# Patient Record
Sex: FEMALE | Race: WHITE | HISPANIC OR LATINO
[De-identification: names, ages, dates, MRNs, and addresses within clinical notes are randomized per-mention and may not be internally consistent; named-entity substitution may affect disease eponyms.]

---

## 2017-12-14 ENCOUNTER — APPOINTMENT (OUTPATIENT)
Dept: CARDIOLOGY | Facility: CLINIC | Age: 57
End: 2017-12-14

## 2019-07-31 ENCOUNTER — APPOINTMENT (OUTPATIENT)
Dept: CV DIAGNOSTICS | Facility: HOSPITAL | Age: 59
End: 2019-07-31

## 2019-08-30 ENCOUNTER — APPOINTMENT (OUTPATIENT)
Dept: CV DIAGNOSTICS | Facility: HOSPITAL | Age: 59
End: 2019-08-30

## 2023-01-04 ENCOUNTER — APPOINTMENT (OUTPATIENT)
Dept: HOME HEALTH SERVICES | Facility: HOME HEALTH | Age: 63
End: 2023-01-04
Payer: MEDICARE

## 2023-01-04 VITALS
BODY MASS INDEX: 37.56 KG/M2 | WEIGHT: 220 LBS | TEMPERATURE: 97.3 F | HEIGHT: 64 IN | DIASTOLIC BLOOD PRESSURE: 68 MMHG | OXYGEN SATURATION: 95 % | SYSTOLIC BLOOD PRESSURE: 102 MMHG | HEART RATE: 77 BPM | RESPIRATION RATE: 18 BRPM

## 2023-01-04 DIAGNOSIS — Z87.898 PERSONAL HISTORY OF OTHER SPECIFIED CONDITIONS: ICD-10-CM

## 2023-01-04 DIAGNOSIS — Z82.3 FAMILY HISTORY OF STROKE: ICD-10-CM

## 2023-01-04 DIAGNOSIS — Z83.438 FAMILY HISTORY OF OTHER DISORDER OF LIPOPROTEIN METABOLISM AND OTHER LIPIDEMIA: ICD-10-CM

## 2023-01-04 DIAGNOSIS — I25.2 OLD MYOCARDIAL INFARCTION: ICD-10-CM

## 2023-01-04 DIAGNOSIS — G47.33 OBSTRUCTIVE SLEEP APNEA (ADULT) (PEDIATRIC): ICD-10-CM

## 2023-01-04 DIAGNOSIS — I10 ESSENTIAL (PRIMARY) HYPERTENSION: ICD-10-CM

## 2023-01-04 DIAGNOSIS — K21.9 GASTRO-ESOPHAGEAL REFLUX DISEASE W/OUT ESOPHAGITIS: ICD-10-CM

## 2023-01-04 DIAGNOSIS — Z82.49 FAMILY HISTORY OF ISCHEMIC HEART DISEASE AND OTHER DISEASES OF THE CIRCULATORY SYSTEM: ICD-10-CM

## 2023-01-04 DIAGNOSIS — E55.9 VITAMIN D DEFICIENCY, UNSPECIFIED: ICD-10-CM

## 2023-01-04 DIAGNOSIS — E78.5 HYPERLIPIDEMIA, UNSPECIFIED: ICD-10-CM

## 2023-01-04 DIAGNOSIS — E27.8 OTHER SPECIFIED DISORDERS OF ADRENAL GLAND: ICD-10-CM

## 2023-01-04 DIAGNOSIS — Z81.8 FAMILY HISTORY OF OTHER MENTAL AND BEHAVIORAL DISORDERS: ICD-10-CM

## 2023-01-04 DIAGNOSIS — Z86.73 PERSONAL HISTORY OF TRANSIENT ISCHEMIC ATTACK (TIA), AND CEREBRAL INFARCTION W/OUT RESIDUAL DEFICITS: ICD-10-CM

## 2023-01-04 DIAGNOSIS — F41.1 GENERALIZED ANXIETY DISORDER: ICD-10-CM

## 2023-01-04 PROCEDURE — 99497 ADVNCD CARE PLAN 30 MIN: CPT | Mod: 95

## 2023-01-04 PROCEDURE — 99344 HOME/RES VST NEW MOD MDM 60: CPT | Mod: 25,95

## 2023-01-04 RX ORDER — ERGOCALCIFEROL 1.25 MG/1
1.25 MG CAPSULE ORAL
Qty: 12 | Refills: 1 | Status: ACTIVE | COMMUNITY
Start: 2023-01-04 | End: 1900-01-01

## 2023-01-04 RX ORDER — ATORVASTATIN CALCIUM 40 MG/1
40 TABLET, FILM COATED ORAL
Qty: 90 | Refills: 3 | Status: ACTIVE | COMMUNITY
Start: 2023-01-04

## 2023-01-04 RX ORDER — GABAPENTIN 300 MG/1
300 CAPSULE ORAL
Qty: 90 | Refills: 3 | Status: ACTIVE | COMMUNITY
Start: 2023-01-04

## 2023-01-04 RX ORDER — CLOPIDOGREL 75 MG/1
75 TABLET, FILM COATED ORAL DAILY
Qty: 90 | Refills: 3 | Status: ACTIVE | COMMUNITY
Start: 2023-01-04

## 2023-01-04 RX ORDER — HYDROCHLOROTHIAZIDE 25 MG/1
25 TABLET ORAL DAILY
Qty: 90 | Refills: 3 | Status: ACTIVE | COMMUNITY
Start: 2023-01-04

## 2023-01-04 RX ORDER — FAMOTIDINE 40 MG/1
40 TABLET, FILM COATED ORAL DAILY
Qty: 90 | Refills: 3 | Status: ACTIVE | COMMUNITY
Start: 2023-01-04 | End: 1900-01-01

## 2023-01-04 RX ORDER — SERTRALINE HYDROCHLORIDE 100 MG/1
100 TABLET, FILM COATED ORAL DAILY
Qty: 90 | Refills: 3 | Status: ACTIVE | COMMUNITY
Start: 2023-01-04

## 2023-01-04 RX ORDER — METOPROLOL TARTRATE 25 MG/1
25 TABLET, FILM COATED ORAL TWICE DAILY
Qty: 180 | Refills: 3 | Status: ACTIVE | COMMUNITY
Start: 2023-01-04

## 2023-01-04 RX ORDER — CLONAZEPAM 0.5 MG/1
0.5 TABLET ORAL
Qty: 30 | Refills: 0 | Status: ACTIVE | COMMUNITY
Start: 2023-01-04

## 2023-01-04 NOTE — CURRENT MEDS
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

## 2023-01-04 NOTE — CHRONIC CARE ASSESSMENT
[Patient Non-adherent to care plan] : patient non-adherent to care plan [de-identified] : does not exercise regularly due to caregiving [de-identified] : ADA, low fat, low sodium- but patient is non adherent

## 2023-01-04 NOTE — PHYSICAL EXAM
[No Acute Distress] : no acute distress [Well Nourished] : well nourished [Well Developed] : well developed [Normal Sclera/Conjunctiva] : normal sclera/conjunctiva [PERRL] : pupils equal, round and reactive to light [EOMI] : extra ocular movement intact [Normal Outer Ear/Nose] : the ears and nose were normal in appearance [Normal Oropharynx] : the oropharynx was normal [No Respiratory Distress] : no respiratory distress [Clear to Auscultation] : lungs were clear to auscultation bilaterally [No Accessory Muscle Use] : no accessory muscle use [Normal Rate] : heart rate was normal  [Regular Rhythm] : with a regular rhythm [Normal S1, S2] : normal S1 and S2 [No Murmurs] : no murmurs heard [No Edema] : there was no peripheral edema [Normal Bowel Sounds] : normal bowel sounds [Non Tender] : non-tender [Soft] : abdomen soft [Not Distended] : not distended [Normal Post Cervical Nodes] : no posterior cervical lymphadenopathy [Normal Anterior Cervical Nodes] : no anterior cervical lymphadenopathy [No CVA Tenderness] : no ~M costovertebral angle tenderness [No Spinal Tenderness] : no spinal tenderness [Normal Gait] : normal gait [Normal Strength/Tone] : muscle strength and tone were normal [No Rash] : no rash [Cranial Nerves Intact] : cranial nerves 2-12 were intact [No Gross Sensory Deficits] : no gross sensory deficits [Oriented x3] : oriented to person, place, and time [Normal Affect] : the affect was normal [Normal Mood] : the mood was normal [Foot Ulcers] : no foot ulcers [de-identified] : Obese female, sitting up comfortably on couch [de-identified] : foot ulcer has healed [de-identified] : no suicidal or homicidal ideation

## 2023-01-04 NOTE — HEALTH RISK ASSESSMENT
[HRA Reviewed] : Health risk assessment reviewed [Independent] : managing finances [No falls in past year] : Patient reported no falls in the past year [No] : The patient does not have visual impairment [TimeGetUpGo] : 0 [de-identified] : fully ambulatory

## 2023-01-04 NOTE — HISTORY OF PRESENT ILLNESS
[FreeTextEntry1] : T [FreeTextEntry2] : HAROON MONET is being seen for a visit provided via telehealth. Real-time audio visual technology was provided using [employer]. If Teledoc technology was not used it was due to inability to connect via Teledoc technology.\par \par  \par \par HAROON MONET (Aug  7 1960) or his/her representative was educated about potential risks associated with any technology used while obtaining care through telehealth during this public health emergency. HAROON MONET (Aug  7 1960) or his/her representative was educated that this Informed Consent for Telehealth Services During a Public Health Emergency will remain in effect solely during the term of the public health emergency. HAROON MONET (Aug  7 1960) or his/her representative has verbally consented to the use of telehealth on 01/04/2023  3:00PM.\par \par  \par \par HAROON MONET was located at their home, [address] [place] [address] [address] [place] [address], at the time of the visit.\par \par The [facility] clinician, MARILIN SHINE, was located remotely at their home in [place] at the time of the visit.\par \par The patient, HAROON MONET, and the [facility] clinician, MARILIN SHINE, participated in the telehealth encounter.\par \par Other participants included the RNTT, who was in the home with the patient, performed vitals monitoring and physical exam, and facilitated the video visit with MARILIN SHINE. The assessment and plan was made on the basis of the information provided by the RNTT.\par \par  \par \par Other participants included: Annmarie Summers RN\par \par  \par \par RNTT Findings: \par \par  \par \par      Vitals: T 97.3 HR 77 RR 18 SpO2 95% /68mmHg\par \par  \par \par      Exam:Obese female, no other pertinent positive findings\par \par  \par \par      Assessment: HAROON MONET is an 62 year with Type 2 Diabetes on insulin with complications, Hypertension, Hyperlipidemia, CAD s/p 2 stents, Mass on Right Kidney, 7 TIAs, 2 MIs, Major Depressive disorder, Smoker seen today for initial home visit.\par \par Patient's last hospitalization was 2 months ago, patient had chest pain found to have CAD and 2 stents were placed.\par \par Since hospitalization patient reports she feels her health is not optimally controlled as 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 No changes in dentition or swallowing reported, No changes in hearing or vision reported. No changes in Cognition reported. Patient  reports depression but denies any suicidal or homicidal ideation. Patient is ADL independent and IADL independent. No changes in gait or falls reported. \par \par Patient's home environment is safe.\par  \par Goals of care discussed. MOLST completed. Patient goal is limited, DNR, DNI.\par \par

## 2023-01-04 NOTE — COUNSELING
[Obese -  ( BMI  >29.9 )] : obese - ( BMI  >29.9 ) [DASH diet recommended] : DASH diet recommended [Sodium restriction 2gm recommended] : sodium restriction 2 gm recommended [Medical/Nutritional supplementation as prescribed] : medical/nutritional supplementation as prescribed [Smoker, not interested in quitting] : smoker, not interested in quitting [Smoke/CO Detectors] : smoke/CO detectors [] : foot exam [Patient not on disease-modifying anti-rheumatic drug due to overall prognosis] : Patient not on disease-modifying anti-rheumatic drug due to overall prognosis [Completed] : Aspirin use discussion completed [Improve weight] : improve weight [Decrease stress] : decrease stress [Discussed disease trajectory with patient/caregiver] : discussed disease trajectory with patient/caregiver [Advanced Directives discussed: ____] : Advanced directives discussed: [unfilled] [DNR] : Code Status: DNR [Limited] : Treatment Guidelines: Limited [DNI] : Intubation: DNI [Last Verification Date: _____] : [facility] Completion/last verification date: [unfilled] [de-identified] : Patient reports No Trach

## 2023-01-04 NOTE — REASON FOR VISIT
[Initial Evaluation] : an initial evaluation [Pre-Visit Preparation] : pre-visit preparation was done [Intercurrent Specialty/Sub-specialty Visits] : the patient has intercurrent specialty/sub-specialty visits [FreeTextEntry1] : Type 2 Diabetes on insulin with complications, Hypertension, Hyperlipidemia, CAD s/p 2 stents, Mass on Right Kidney, 7 TIAs, 2 MIs, Major Depressive disorder, Smoker [FreeTextEntry2] : Chart Review [FreeTextEntry3] : Cardiology, Pain Management, Psychiatry, Neurology

## 2023-01-06 ENCOUNTER — OFFICE (OUTPATIENT)
Dept: URBAN - METROPOLITAN AREA CLINIC 30 | Facility: CLINIC | Age: 63
Setting detail: OPHTHALMOLOGY
End: 2023-01-06
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
## 2023-01-06 PROCEDURE — NO SHOW FE NO SHOW FEE: Performed by: OPHTHALMOLOGY

## 2023-01-19 ENCOUNTER — TRANSCRIPTION ENCOUNTER (OUTPATIENT)
Age: 63
End: 2023-01-19

## 2023-01-19 ENCOUNTER — NON-APPOINTMENT (OUTPATIENT)
Age: 63
End: 2023-01-19

## 2023-01-20 ENCOUNTER — NON-APPOINTMENT (OUTPATIENT)
Age: 63
End: 2023-01-20

## 2023-01-20 ENCOUNTER — LABORATORY RESULT (OUTPATIENT)
Age: 63
End: 2023-01-20

## 2023-01-23 ENCOUNTER — LABORATORY RESULT (OUTPATIENT)
Age: 63
End: 2023-01-23

## 2023-01-24 ENCOUNTER — LABORATORY RESULT (OUTPATIENT)
Age: 63
End: 2023-01-24

## 2023-01-25 ENCOUNTER — LABORATORY RESULT (OUTPATIENT)
Age: 63
End: 2023-01-25

## 2023-01-31 ENCOUNTER — TRANSCRIPTION ENCOUNTER (OUTPATIENT)
Age: 63
End: 2023-01-31

## 2023-02-01 ENCOUNTER — TRANSCRIPTION ENCOUNTER (OUTPATIENT)
Age: 63
End: 2023-02-01

## 2023-02-06 ENCOUNTER — NON-APPOINTMENT (OUTPATIENT)
Age: 63
End: 2023-02-06

## 2023-02-07 ENCOUNTER — TRANSCRIPTION ENCOUNTER (OUTPATIENT)
Age: 63
End: 2023-02-07

## 2023-02-07 ENCOUNTER — NON-APPOINTMENT (OUTPATIENT)
Age: 63
End: 2023-02-07

## 2023-02-08 ENCOUNTER — TRANSCRIPTION ENCOUNTER (OUTPATIENT)
Age: 63
End: 2023-02-08

## 2023-02-09 ENCOUNTER — NON-APPOINTMENT (OUTPATIENT)
Age: 63
End: 2023-02-09

## 2023-02-20 ENCOUNTER — INPATIENT (INPATIENT)
Facility: HOSPITAL | Age: 63
LOS: 2 days | Discharge: HOME HEALTH | DRG: 617 | End: 2023-02-23
Attending: STUDENT IN AN ORGANIZED HEALTH CARE EDUCATION/TRAINING PROGRAM
Payer: MEDICARE

## 2023-02-20 ENCOUNTER — TRANSCRIPTION ENCOUNTER (OUTPATIENT)
Age: 63
End: 2023-02-20

## 2023-02-20 VITALS
SYSTOLIC BLOOD PRESSURE: 112 MMHG | OXYGEN SATURATION: 94 % | WEIGHT: 210.1 LBS | RESPIRATION RATE: 16 BRPM | HEART RATE: 87 BPM | HEIGHT: 64 IN | TEMPERATURE: 99 F | DIASTOLIC BLOOD PRESSURE: 76 MMHG

## 2023-02-20 DIAGNOSIS — F41.9 ANXIETY DISORDER, UNSPECIFIED: ICD-10-CM

## 2023-02-20 DIAGNOSIS — I10 ESSENTIAL (PRIMARY) HYPERTENSION: ICD-10-CM

## 2023-02-20 DIAGNOSIS — G20 PARKINSON'S DISEASE: ICD-10-CM

## 2023-02-20 DIAGNOSIS — I25.2 OLD MYOCARDIAL INFARCTION: ICD-10-CM

## 2023-02-20 DIAGNOSIS — M86.9 OSTEOMYELITIS, UNSPECIFIED: ICD-10-CM

## 2023-02-20 DIAGNOSIS — Z29.9 ENCOUNTER FOR PROPHYLACTIC MEASURES, UNSPECIFIED: ICD-10-CM

## 2023-02-20 DIAGNOSIS — K21.9 GASTRO-ESOPHAGEAL REFLUX DISEASE WITHOUT ESOPHAGITIS: ICD-10-CM

## 2023-02-20 DIAGNOSIS — E11.9 TYPE 2 DIABETES MELLITUS WITHOUT COMPLICATIONS: ICD-10-CM

## 2023-02-20 LAB
ALBUMIN SERPL ELPH-MCNC: 3.2 G/DL — LOW (ref 3.3–5)
ALP SERPL-CCNC: 139 U/L — HIGH (ref 40–120)
ALT FLD-CCNC: 23 U/L — SIGNIFICANT CHANGE UP (ref 10–45)
ANION GAP SERPL CALC-SCNC: 10 MMOL/L — SIGNIFICANT CHANGE UP (ref 5–17)
APTT BLD: 29 SEC — SIGNIFICANT CHANGE UP (ref 27.5–35.5)
AST SERPL-CCNC: 19 U/L — SIGNIFICANT CHANGE UP (ref 10–40)
B-OH-BUTYR SERPL-SCNC: 0.1 MMOL/L — SIGNIFICANT CHANGE UP
BASE EXCESS BLDV CALC-SCNC: 0.3 MMOL/L — SIGNIFICANT CHANGE UP (ref -2–3)
BASOPHILS # BLD AUTO: 0.09 K/UL — SIGNIFICANT CHANGE UP (ref 0–0.2)
BASOPHILS NFR BLD AUTO: 0.7 % — SIGNIFICANT CHANGE UP (ref 0–2)
BILIRUB SERPL-MCNC: 0.2 MG/DL — SIGNIFICANT CHANGE UP (ref 0.2–1.2)
BLD GP AB SCN SERPL QL: NEGATIVE — SIGNIFICANT CHANGE UP
BUN SERPL-MCNC: 23 MG/DL — SIGNIFICANT CHANGE UP (ref 7–23)
CA-I SERPL-SCNC: 1.22 MMOL/L — SIGNIFICANT CHANGE UP (ref 1.15–1.33)
CALCIUM SERPL-MCNC: 9.4 MG/DL — SIGNIFICANT CHANGE UP (ref 8.4–10.5)
CHLORIDE SERPL-SCNC: 95 MMOL/L — LOW (ref 96–108)
CK MB CFR SERPL CALC: 1.6 NG/ML — SIGNIFICANT CHANGE UP (ref 0–6.7)
CK SERPL-CCNC: 47 U/L — SIGNIFICANT CHANGE UP (ref 25–170)
CO2 BLDV-SCNC: 28 MMOL/L — HIGH (ref 22–26)
CO2 SERPL-SCNC: 27 MMOL/L — SIGNIFICANT CHANGE UP (ref 22–31)
CREAT SERPL-MCNC: 1.18 MG/DL — SIGNIFICANT CHANGE UP (ref 0.5–1.3)
CRP SERPL-MCNC: 28.2 MG/L — HIGH (ref 0–4)
EGFR: 53 ML/MIN/1.73M2 — LOW
EOSINOPHIL # BLD AUTO: 0.41 K/UL — SIGNIFICANT CHANGE UP (ref 0–0.5)
EOSINOPHIL NFR BLD AUTO: 3 % — SIGNIFICANT CHANGE UP (ref 0–6)
ERYTHROCYTE [SEDIMENTATION RATE] IN BLOOD: 101 MM/HR — HIGH
GAS PNL BLDV: 128 MMOL/L — LOW (ref 136–145)
GAS PNL BLDV: SIGNIFICANT CHANGE UP
GLUCOSE SERPL-MCNC: 351 MG/DL — HIGH (ref 70–99)
GRAM STN FLD: SIGNIFICANT CHANGE UP
HCO3 BLDV-SCNC: 26 MMOL/L — SIGNIFICANT CHANGE UP (ref 22–29)
HCT VFR BLD CALC: 38.8 % — SIGNIFICANT CHANGE UP (ref 34.5–45)
HGB BLD-MCNC: 12.6 G/DL — SIGNIFICANT CHANGE UP (ref 11.5–15.5)
IMM GRANULOCYTES NFR BLD AUTO: 0.9 % — SIGNIFICANT CHANGE UP (ref 0–0.9)
INR BLD: 1.05 — SIGNIFICANT CHANGE UP (ref 0.88–1.16)
LACTATE SERPL-SCNC: 1.8 MMOL/L — SIGNIFICANT CHANGE UP (ref 0.5–2)
LYMPHOCYTES # BLD AUTO: 19.1 % — SIGNIFICANT CHANGE UP (ref 13–44)
LYMPHOCYTES # BLD AUTO: 2.63 K/UL — SIGNIFICANT CHANGE UP (ref 1–3.3)
MCHC RBC-ENTMCNC: 27 PG — SIGNIFICANT CHANGE UP (ref 27–34)
MCHC RBC-ENTMCNC: 32.5 GM/DL — SIGNIFICANT CHANGE UP (ref 32–36)
MCV RBC AUTO: 83.3 FL — SIGNIFICANT CHANGE UP (ref 80–100)
MONOCYTES # BLD AUTO: 0.68 K/UL — SIGNIFICANT CHANGE UP (ref 0–0.9)
MONOCYTES NFR BLD AUTO: 4.9 % — SIGNIFICANT CHANGE UP (ref 2–14)
NEUTROPHILS # BLD AUTO: 9.82 K/UL — HIGH (ref 1.8–7.4)
NEUTROPHILS NFR BLD AUTO: 71.4 % — SIGNIFICANT CHANGE UP (ref 43–77)
NRBC # BLD: 0 /100 WBCS — SIGNIFICANT CHANGE UP (ref 0–0)
PCO2 BLDV: 48 MMHG — HIGH (ref 39–42)
PH BLDV: 7.35 — SIGNIFICANT CHANGE UP (ref 7.32–7.43)
PLATELET # BLD AUTO: 471 K/UL — HIGH (ref 150–400)
PO2 BLDV: 36 MMHG — SIGNIFICANT CHANGE UP (ref 25–45)
POTASSIUM BLDV-SCNC: 5 MMOL/L — SIGNIFICANT CHANGE UP (ref 3.5–5.1)
POTASSIUM SERPL-MCNC: 5.1 MMOL/L — SIGNIFICANT CHANGE UP (ref 3.5–5.3)
POTASSIUM SERPL-SCNC: 5.1 MMOL/L — SIGNIFICANT CHANGE UP (ref 3.5–5.3)
PROT SERPL-MCNC: 7.8 G/DL — SIGNIFICANT CHANGE UP (ref 6–8.3)
PROTHROM AB SERPL-ACNC: 12.5 SEC — SIGNIFICANT CHANGE UP (ref 10.5–13.4)
RBC # BLD: 4.66 M/UL — SIGNIFICANT CHANGE UP (ref 3.8–5.2)
RBC # FLD: 15.9 % — HIGH (ref 10.3–14.5)
RH IG SCN BLD-IMP: POSITIVE — SIGNIFICANT CHANGE UP
SAO2 % BLDV: 63.8 % — LOW (ref 67–88)
SARS-COV-2 RNA SPEC QL NAA+PROBE: NEGATIVE — SIGNIFICANT CHANGE UP
SODIUM SERPL-SCNC: 132 MMOL/L — LOW (ref 135–145)
SPECIMEN SOURCE: SIGNIFICANT CHANGE UP
WBC # BLD: 13.75 K/UL — HIGH (ref 3.8–10.5)
WBC # FLD AUTO: 13.75 K/UL — HIGH (ref 3.8–10.5)

## 2023-02-20 PROCEDURE — 99223 1ST HOSP IP/OBS HIGH 75: CPT | Mod: GC

## 2023-02-20 PROCEDURE — 71045 X-RAY EXAM CHEST 1 VIEW: CPT | Mod: 26

## 2023-02-20 PROCEDURE — 73660 X-RAY EXAM OF TOE(S): CPT | Mod: 26,RT

## 2023-02-20 PROCEDURE — 99285 EMERGENCY DEPT VISIT HI MDM: CPT

## 2023-02-20 RX ORDER — LIPASE/PROTEASE/AMYLASE 16-48-48K
2 CAPSULE,DELAYED RELEASE (ENTERIC COATED) ORAL
Refills: 0 | Status: DISCONTINUED | OUTPATIENT
Start: 2023-02-20 | End: 2023-02-23

## 2023-02-20 RX ORDER — ACETAMINOPHEN 500 MG
650 TABLET ORAL ONCE
Refills: 0 | Status: COMPLETED | OUTPATIENT
Start: 2023-02-20 | End: 2023-02-20

## 2023-02-20 RX ORDER — SODIUM CHLORIDE 9 MG/ML
1000 INJECTION, SOLUTION INTRAVENOUS
Refills: 0 | Status: DISCONTINUED | OUTPATIENT
Start: 2023-02-20 | End: 2023-02-23

## 2023-02-20 RX ORDER — SERTRALINE 25 MG/1
150 TABLET, FILM COATED ORAL EVERY 24 HOURS
Refills: 0 | Status: DISCONTINUED | OUTPATIENT
Start: 2023-02-21 | End: 2023-02-23

## 2023-02-20 RX ORDER — PIPERACILLIN AND TAZOBACTAM 4; .5 G/20ML; G/20ML
4.5 INJECTION, POWDER, LYOPHILIZED, FOR SOLUTION INTRAVENOUS EVERY 8 HOURS
Refills: 0 | Status: DISCONTINUED | OUTPATIENT
Start: 2023-02-21 | End: 2023-02-23

## 2023-02-20 RX ORDER — DEXTROSE 50 % IN WATER 50 %
25 SYRINGE (ML) INTRAVENOUS ONCE
Refills: 0 | Status: DISCONTINUED | OUTPATIENT
Start: 2023-02-20 | End: 2023-02-23

## 2023-02-20 RX ORDER — INSULIN GLARGINE 100 [IU]/ML
15 INJECTION, SOLUTION SUBCUTANEOUS AT BEDTIME
Refills: 0 | Status: DISCONTINUED | OUTPATIENT
Start: 2023-02-20 | End: 2023-02-23

## 2023-02-20 RX ORDER — GLUCAGON INJECTION, SOLUTION 0.5 MG/.1ML
1 INJECTION, SOLUTION SUBCUTANEOUS ONCE
Refills: 0 | Status: DISCONTINUED | OUTPATIENT
Start: 2023-02-20 | End: 2023-02-23

## 2023-02-20 RX ORDER — DEXTROSE 50 % IN WATER 50 %
12.5 SYRINGE (ML) INTRAVENOUS ONCE
Refills: 0 | Status: DISCONTINUED | OUTPATIENT
Start: 2023-02-20 | End: 2023-02-23

## 2023-02-20 RX ORDER — DEXTROSE 50 % IN WATER 50 %
15 SYRINGE (ML) INTRAVENOUS ONCE
Refills: 0 | Status: DISCONTINUED | OUTPATIENT
Start: 2023-02-20 | End: 2023-02-23

## 2023-02-20 RX ORDER — INFLUENZA VIRUS VACCINE 15; 15; 15; 15 UG/.5ML; UG/.5ML; UG/.5ML; UG/.5ML
0.5 SUSPENSION INTRAMUSCULAR ONCE
Refills: 0 | Status: DISCONTINUED | OUTPATIENT
Start: 2023-02-20 | End: 2023-02-23

## 2023-02-20 RX ORDER — CLOPIDOGREL BISULFATE 75 MG/1
75 TABLET, FILM COATED ORAL EVERY 24 HOURS
Refills: 0 | Status: DISCONTINUED | OUTPATIENT
Start: 2023-02-21 | End: 2023-02-23

## 2023-02-20 RX ORDER — VANCOMYCIN HCL 1 G
1000 VIAL (EA) INTRAVENOUS ONCE
Refills: 0 | Status: COMPLETED | OUTPATIENT
Start: 2023-02-20 | End: 2023-02-20

## 2023-02-20 RX ORDER — PANTOPRAZOLE SODIUM 20 MG/1
40 TABLET, DELAYED RELEASE ORAL
Refills: 0 | Status: DISCONTINUED | OUTPATIENT
Start: 2023-02-20 | End: 2023-02-23

## 2023-02-20 RX ORDER — NICOTINE POLACRILEX 2 MG
1 GUM BUCCAL DAILY
Refills: 0 | Status: DISCONTINUED | OUTPATIENT
Start: 2023-02-20 | End: 2023-02-23

## 2023-02-20 RX ORDER — ASPIRIN/CALCIUM CARB/MAGNESIUM 324 MG
81 TABLET ORAL EVERY 24 HOURS
Refills: 0 | Status: DISCONTINUED | OUTPATIENT
Start: 2023-02-21 | End: 2023-02-23

## 2023-02-20 RX ORDER — METOPROLOL TARTRATE 50 MG
25 TABLET ORAL EVERY 12 HOURS
Refills: 0 | Status: DISCONTINUED | OUTPATIENT
Start: 2023-02-21 | End: 2023-02-23

## 2023-02-20 RX ORDER — PIPERACILLIN AND TAZOBACTAM 4; .5 G/20ML; G/20ML
3.38 INJECTION, POWDER, LYOPHILIZED, FOR SOLUTION INTRAVENOUS ONCE
Refills: 0 | Status: COMPLETED | OUTPATIENT
Start: 2023-02-20 | End: 2023-02-20

## 2023-02-20 RX ORDER — SODIUM CHLORIDE 9 MG/ML
1000 INJECTION INTRAMUSCULAR; INTRAVENOUS; SUBCUTANEOUS ONCE
Refills: 0 | Status: COMPLETED | OUTPATIENT
Start: 2023-02-20 | End: 2023-02-20

## 2023-02-20 RX ORDER — INSULIN LISPRO 100/ML
VIAL (ML) SUBCUTANEOUS
Refills: 0 | Status: DISCONTINUED | OUTPATIENT
Start: 2023-02-20 | End: 2023-02-23

## 2023-02-20 RX ORDER — GABAPENTIN 400 MG/1
300 CAPSULE ORAL EVERY 24 HOURS
Refills: 0 | Status: DISCONTINUED | OUTPATIENT
Start: 2023-02-21 | End: 2023-02-23

## 2023-02-20 RX ORDER — ENOXAPARIN SODIUM 100 MG/ML
40 INJECTION SUBCUTANEOUS EVERY 24 HOURS
Refills: 0 | Status: DISCONTINUED | OUTPATIENT
Start: 2023-02-21 | End: 2023-02-21

## 2023-02-20 RX ORDER — ATORVASTATIN CALCIUM 80 MG/1
40 TABLET, FILM COATED ORAL AT BEDTIME
Refills: 0 | Status: DISCONTINUED | OUTPATIENT
Start: 2023-02-20 | End: 2023-02-23

## 2023-02-20 RX ORDER — VANCOMYCIN HCL 1 G
1500 VIAL (EA) INTRAVENOUS EVERY 12 HOURS
Refills: 0 | Status: COMPLETED | OUTPATIENT
Start: 2023-02-21 | End: 2023-02-21

## 2023-02-20 RX ORDER — CLONAZEPAM 1 MG
0.5 TABLET ORAL EVERY 24 HOURS
Refills: 0 | Status: DISCONTINUED | OUTPATIENT
Start: 2023-02-20 | End: 2023-02-23

## 2023-02-20 RX ORDER — INSULIN LISPRO 100/ML
5 VIAL (ML) SUBCUTANEOUS ONCE
Refills: 0 | Status: COMPLETED | OUTPATIENT
Start: 2023-02-20 | End: 2023-02-20

## 2023-02-20 RX ADMIN — Medication 650 MILLIGRAM(S): at 18:33

## 2023-02-20 RX ADMIN — Medication 10: at 21:55

## 2023-02-20 RX ADMIN — PIPERACILLIN AND TAZOBACTAM 200 GRAM(S): 4; .5 INJECTION, POWDER, LYOPHILIZED, FOR SOLUTION INTRAVENOUS at 18:04

## 2023-02-20 RX ADMIN — Medication 5 UNIT(S): at 19:01

## 2023-02-20 RX ADMIN — PIPERACILLIN AND TAZOBACTAM 3.38 GRAM(S): 4; .5 INJECTION, POWDER, LYOPHILIZED, FOR SOLUTION INTRAVENOUS at 18:33

## 2023-02-20 RX ADMIN — INSULIN GLARGINE 15 UNIT(S): 100 INJECTION, SOLUTION SUBCUTANEOUS at 23:19

## 2023-02-20 RX ADMIN — Medication 250 MILLIGRAM(S): at 18:16

## 2023-02-20 RX ADMIN — ATORVASTATIN CALCIUM 40 MILLIGRAM(S): 80 TABLET, FILM COATED ORAL at 21:55

## 2023-02-20 RX ADMIN — SODIUM CHLORIDE 1000 MILLILITER(S): 9 INJECTION INTRAMUSCULAR; INTRAVENOUS; SUBCUTANEOUS at 19:28

## 2023-02-20 RX ADMIN — Medication 650 MILLIGRAM(S): at 17:59

## 2023-02-20 RX ADMIN — Medication 1000 MILLIGRAM(S): at 19:28

## 2023-02-20 RX ADMIN — SODIUM CHLORIDE 1000 MILLILITER(S): 9 INJECTION INTRAMUSCULAR; INTRAVENOUS; SUBCUTANEOUS at 17:59

## 2023-02-20 NOTE — H&P ADULT - NSHPPHYSICALEXAM_GEN_ALL_CORE
T(C): 37.1 (02-20-23 @ 17:28), Max: 37.1 (02-20-23 @ 17:28)  HR: 87 (02-20-23 @ 17:28) (87 - 87)  BP: 112/76 (02-20-23 @ 17:28) (112/76 - 112/76)  RR: 16 (02-20-23 @ 17:28) (16 - 16)  SpO2: 94% (02-20-23 @ 17:28) (94% - 94%)    General: adult female lying in bed in NAD  HEENT: head NC/AT, no conjunctival injection, EOMI, MMM  Neck: supple  Cardiac: RRR, +S1/S2, no murmurs  Respiratory: lungs CTAB in upper fields, diminished breath sounds in b/l bases suspect 2/2 body habitus  Abdomen: normal bowel sounds, soft, NT, ND  Extremities: WWP, R foot wrapped in gauze, nontender to palpation, no LE edema  Vascular: 1+ radial pulses b/l, 2+ L DP, 1+ R PT pulse  Neuro: A&Ox3, no focal deficits  Psych: speech non-pressured, thoughts goal-oriented

## 2023-02-20 NOTE — H&P ADULT - PROBLEM SELECTOR PLAN 1
Presented w/ pus/foul-smelling drainage from the R great toenail. Not currently in pain. XR showing bone erosion and possible subcutaneous emphysema per radiology wet read. ESR and CRP elevated w/ leukocytosis. VSS. S/p Vancomycin 1g and Zosyn 3.375g in the ED. Seen by podiatry in the ED, planning for likely amputation after MRI, which will be used to determine level of amputation.  - follow up XR foot official read  - follow up MRI w/ con  - appreciate podiatry recs re wound care  - follow up deep wound Cx  - follow up BCx  - continue broad spectrum abx for now until cultures result

## 2023-02-20 NOTE — H&P ADULT - PROBLEM SELECTOR PLAN 8
F: none  E: replenish PRN  N: dash/tlc + cc  GI ppx: pantoprazole 40mg qAM  DVT ppx: lovenox 40mg q24h  Dispo: [facility]

## 2023-02-20 NOTE — ED ADULT TRIAGE NOTE - CHIEF COMPLAINT QUOTE
Pt presents to ED by EMS C/O infection to R first toe with swelling, drainage and pain radiating up R ankle. Pt sent by MD Sweet. Denies fevers. Also C/O tenderness and drainage from breast. Reports recent hx of negative breast biopsy. Hx of CAD with stents, stents to lower legs, COPD, DM, EMS reports  in field. Pt states, " My BG has been uncontrolled I need a new Dr.".

## 2023-02-20 NOTE — ED PROVIDER NOTE - OBJECTIVE STATEMENT
62F PMH CAD w/ stents, DM, HTN, PAD w/ b/l arterial stents (cannot recall name of vascular surgeon), CVA (no residual), peripheral neuropathy, mild early dementia, PSHx cholecystectomy p/w several complaints. Main concern is RLE pain. Pt states she has had to have her 1st toenail removed several times. Now since ~4w ago she noticed increased swelling/discoloration to her 1st toe. Has no sensation there at baseline and has no pain to toe- however now has pain travelling from ankle upwards over last few days. Had visiting doctor evaluate and was referred to ED. Pt also notes ~1mo of intermittent pain/drainage from L nipple. States she had a mammogram a few weeks prior to that, unknown results. Also c/o elevated glucose levels. States she is adherent to her meds. Occasional nausea. No other systemic symptoms.   Denies HA, f/c, SOB/CP, VD, focal weakness, new numbness, abd pain, urinary complaints, rashes, other joint pain. Cannot recall specific precipitating trauma.

## 2023-02-20 NOTE — H&P ADULT - PROBLEM SELECTOR PLAN 4
Known HTN, takes HCTZ 25mg qAM and Metoprolol tartrate 25mg BID at home.  - continue HCTZ and metoprolol w/ hold parameters

## 2023-02-20 NOTE — ED ADULT NURSE NOTE - OBJECTIVE STATEMENT
Pt is 62 y.o female client [facility] from home complaining of first big toe pain and swelling drainage and pain radiating up R ankle. Pt sent by MD Sweet. Denies fevers. Also C/O tenderness and drainage from breast. Reports recent hx of negative breast biopsy. Hx of CAD with stents, stents to lower legs, COPD, DM, EMS reports  in field. EKG done. IV inserted and labs drawn. Pt denies cp, sob, fever, chills, dizziness, headache, tingling, numbness, n/v/d, abd pain and urinary sx. Assessment ongoing. Will cont to monitor. Denies numbness or tingling.

## 2023-02-20 NOTE — CONSULT NOTE ADULT - SUBJECTIVE AND OBJECTIVE BOX
Attending: Dr. Robin     Patient is a 62y old  Female who presents with a chief complaint of right great toe infection     HPI: 62F PMH CAD w/ stents, DM, HTN, PAD w/ left LE arterial stent 2 weeks ago does not recall surgeon name, CVA (no residual), MI x 2 (most recent 2013), peripheral neuropathy, mild early dementia, PSHx cholecystectomy p/w right great toe wound that has been present for 1 month. Pt states she has had to have her 1st toenail removed several times in the past (does not recall name of podiatrist but states she only saw him one time one month ago for fungal nails). She noticed her right big toe had a wound on it roughly 1 month ago she has been cleaning the wound at home with hydrogen peroxide and water and dressing it with antibiotic cream. She notes malodor and drainage from the wound as well as color changes to her right big toe.  Has no sensation there at baseline and has no pain to toe. She admits to pain on the medial lower leg but denies calf pain.  She reports she had a stent placed in her left leg 2 weeks ago and is scheduled for a stent to be placed in her right leg at [facility] on 3/2. Had visiting doctor evaluate and was referred to ED. She denies HA, f/c, SOB/CP, N/V/D.       Review of systems negative except per HPI and as stated below  General:	 no weakness; no fevers, no chills  Skin/Breast: no rash  Respiratory and Thorax: no SOB, no cough  Cardiovascular:	No chest pain  Gastrointestinal:	 no nausea, vomiting , diarrhea  Genitourinary:	no dysuria, no difficulty urinating, no hematuria  Musculoskeletal:	no weakness, no joint swelling/pain  Neurological:	no focal weakness/numbness  Endocrine:	no polyuria, no polydipsia    PAST MEDICAL & SURGICAL HISTORY:    Home Medications:    Allergies    No Known Allergies    Intolerances      FAMILY HISTORY: DM-father     Social History: unknown       LABS                        12.6   13.75 )-----------( 471      ( 20 Feb 2023 18:01 )             38.8     02-20    132<L>  |  95<L>  |  23  ----------------------------<  351<H>  5.1   |  27  |  1.18    Ca    9.4      20 Feb 2023 18:01    TPro  7.8  /  Alb  3.2<L>  /  TBili  0.2  /  DBili  x   /  AST  19  /  ALT  23  /  AlkPhos  139<H>  02-20    PT/INR - ( 20 Feb 2023 18:01 )   PT: 12.5 sec;   INR: 1.05          PTT - ( 20 Feb 2023 18:01 )  PTT:29.0 sec    Vital Signs Last 24 Hrs  T(C): 37.1 (20 Feb 2023 17:28), Max: 37.1 (20 Feb 2023 17:28)  T(F): 98.8 (20 Feb 2023 17:28), Max: 98.8 (20 Feb 2023 17:28)  HR: 87 (20 Feb 2023 17:28) (87 - 87)  BP: 112/76 (20 Feb 2023 17:28) (112/76 - 112/76)  BP(mean): --  RR: 16 (20 Feb 2023 17:28) (16 - 16)  SpO2: 94% (20 Feb 2023 17:28) (94% - 94%)    Parameters below as of 20 Feb 2023 17:28  Patient On (Oxygen Delivery Method): room air      PHYSICAL EXAM  General: NAD, AA0x3  Lower Extremity Focused:  Vasc: Right DP 1/4, PT biphasic on doppler, left DP/PT 2/4, right dorsal foot with mild non pitting edema, Right great toe with dusky changes to level of MPJ  Derm: right great toe: [id number] wound to distal aspect, predebridement: hyperkeratotic cap with underlying fibrotic plug: post debridement: fibrotic base, probes to bone, +malodor, + seropurulent drainage, macerated border, periwound erythema   Neuro: Protective sensation absent   MSK: 5/5 muscle strength in all compartments no pain with ROM of R ankle or 1st MPJ, right medial lower leg tender to palpation       RADIOLOGY: X-ray right toes: WET READ: cortical erosion of distal phalanx great toe with pathologic fracture and surrounding soft tissue hypordensites (most likely due to soft tissue defect), chronic fracture base of 3rd proximal phalanx                        Attending: Dr. Robin     Patient is a 62y old  Female who presents with a chief complaint of right great toe infection     HPI: 62F PMH CAD w/ stents, DM, HTN, PAD w/ left LE arterial stent 2 weeks ago does not recall surgeon name, CVA (no residual), MI x 2 (most recent 2013), peripheral neuropathy, mild early dementia, PSHx cholecystectomy p/w right great toe wound that has been present for 1 month. Pt states she has had to have her 1st toenail removed several times in the past (does not recall name of podiatrist but states she only saw him one time one month ago for fungal nails). She noticed her right big toe had a wound on it roughly 1 month ago she has been cleaning the wound at home with hydrogen peroxide and water and dressing it with antibiotic cream. She notes malodor and drainage from the wound as well as color changes to her right big toe.  Has no sensation there at baseline and has no pain to toe. She admits to pain on the medial lower leg but denies calf pain.  She reports she had a stent placed in her left leg 2 weeks ago and is scheduled for a stent to be placed in her right leg at [facility] on 3/2. Had visiting doctor evaluate and was referred to ED. She denies HA, f/c, SOB/CP, N/V/D.       Review of systems negative except per HPI and as stated below  General:	 no weakness; no fevers, no chills  Skin/Breast: no rash  Respiratory and Thorax: no SOB, no cough  Cardiovascular:	No chest pain  Gastrointestinal:	 no nausea, vomiting , diarrhea  Genitourinary:	no dysuria, no difficulty urinating, no hematuria  Musculoskeletal:	no weakness, no joint swelling/pain  Neurological:	no focal weakness/numbness  Endocrine:	no polyuria, no polydipsia    PAST MEDICAL & SURGICAL HISTORY:    Home Medications:    Allergies    No Known Allergies    Intolerances      FAMILY HISTORY: DM-father     Social History: unknown       LABS                        12.6   13.75 )-----------( 471      ( 20 Feb 2023 18:01 )             38.8     02-20    132<L>  |  95<L>  |  23  ----------------------------<  351<H>  5.1   |  27  |  1.18    Ca    9.4      20 Feb 2023 18:01    TPro  7.8  /  Alb  3.2<L>  /  TBili  0.2  /  DBili  x   /  AST  19  /  ALT  23  /  AlkPhos  139<H>  02-20    PT/INR - ( 20 Feb 2023 18:01 )   PT: 12.5 sec;   INR: 1.05          PTT - ( 20 Feb 2023 18:01 )  PTT:29.0 sec    Vital Signs Last 24 Hrs  T(C): 37.1 (20 Feb 2023 17:28), Max: 37.1 (20 Feb 2023 17:28)  T(F): 98.8 (20 Feb 2023 17:28), Max: 98.8 (20 Feb 2023 17:28)  HR: 87 (20 Feb 2023 17:28) (87 - 87)  BP: 112/76 (20 Feb 2023 17:28) (112/76 - 112/76)  BP(mean): --  RR: 16 (20 Feb 2023 17:28) (16 - 16)  SpO2: 94% (20 Feb 2023 17:28) (94% - 94%)    Parameters below as of 20 Feb 2023 17:28  Patient On (Oxygen Delivery Method): room air      PHYSICAL EXAM  General: NAD, AA0x3  Lower Extremity Focused:  Vasc: Right DP 1/4, PT biphasic on doppler, left DP/PT 2/4, right dorsal foot with mild non pitting edema, Right great toe with dusky changes to level of MPJ  Derm: right great toe: [id number] wound to distal aspect, predebridement: hyperkeratotic cap with underlying fibrotic plug: post debridement: fibrotic base, probes to bone, +malodor, + seropurulent drainage, macerated border, periwound erythema, no crepitus or fluctuance   Neuro: Protective sensation absent   MSK: 5/5 muscle strength in all compartments no pain with ROM of R ankle or 1st MPJ, right medial lower leg tender to palpation       RADIOLOGY: X-ray right toes: WET READ: cortical erosion of distal phalanx great toe with pathologic fracture and surrounding soft tissue hypordensites (most likely due to soft tissue defect), chronic fracture base of 3rd proximal phalanx                        Attending: Dr. Robin     Patient is a 62y old  Female who presents with a chief complaint of right great toe infection     HPI: 62F PMH CAD w/ stents, DM, HTN, PAD w/ left LE arterial stent 2 weeks ago does not recall surgeon name, CVA (no residual), MI x 2 (most recent 2013), peripheral neuropathy, mild early dementia, PSHx cholecystectomy p/w right great toe wound that has been present for 1 month. Pt states she has had to have her 1st toenail removed several times in the past (does not recall name of podiatrist but states she only saw him one time one month ago for fungal nails). She noticed her right big toe had a wound on it roughly 1 month ago she has been cleaning the wound at home with hydrogen peroxide and water and dressing it with antibiotic cream. She notes malodor and drainage from the wound as well as color changes to her right big toe.  Has no sensation there at baseline and has no pain to toe. She admits to pain on the medial lower leg but denies calf pain.  She reports she had a stent placed in her left leg 2 weeks ago and is scheduled for a stent to be placed in her right leg at [facility] on 3/2. Had visiting doctor evaluate and was referred to ED. She denies HA, f/c, SOB/CP, N/V/D.       Review of systems negative except per HPI and as stated below  General:	 no weakness; no fevers, no chills  Skin/Breast: no rash  Respiratory and Thorax: no SOB, no cough  Cardiovascular:	No chest pain  Gastrointestinal:	 no nausea, vomiting , diarrhea  Genitourinary:	no dysuria, no difficulty urinating, no hematuria  Musculoskeletal:	no weakness, no joint swelling/pain  Neurological:	no focal weakness/numbness  Endocrine:	no polyuria, no polydipsia    PAST MEDICAL & SURGICAL HISTORY:    Home Medications:    Allergies    No Known Allergies    Intolerances      FAMILY HISTORY: DM-father     Social History: unknown       LABS                        12.6   13.75 )-----------( 471      ( 20 Feb 2023 18:01 )             38.8     02-20    132<L>  |  95<L>  |  23  ----------------------------<  351<H>  5.1   |  27  |  1.18    Ca    9.4      20 Feb 2023 18:01    TPro  7.8  /  Alb  3.2<L>  /  TBili  0.2  /  DBili  x   /  AST  19  /  ALT  23  /  AlkPhos  139<H>  02-20    PT/INR - ( 20 Feb 2023 18:01 )   PT: 12.5 sec;   INR: 1.05          PTT - ( 20 Feb 2023 18:01 )  PTT:29.0 sec    Vital Signs Last 24 Hrs  T(C): 37.1 (20 Feb 2023 17:28), Max: 37.1 (20 Feb 2023 17:28)  T(F): 98.8 (20 Feb 2023 17:28), Max: 98.8 (20 Feb 2023 17:28)  HR: 87 (20 Feb 2023 17:28) (87 - 87)  BP: 112/76 (20 Feb 2023 17:28) (112/76 - 112/76)  BP(mean): --  RR: 16 (20 Feb 2023 17:28) (16 - 16)  SpO2: 94% (20 Feb 2023 17:28) (94% - 94%)    Parameters below as of 20 Feb 2023 17:28  Patient On (Oxygen Delivery Method): room air      PHYSICAL EXAM  General: NAD, AA0x3  Lower Extremity Focused:  Vasc: Right DP 1/4, PT biphasic on doppler, left DP/PT 2/4, right dorsal foot with mild non pitting edema, Right great toe with dusky changes to level of MPJ  Derm: right great toe: [id number] wound to distal aspect, predebridement: hyperkeratotic cap with underlying fibrotic plug: post debridement: fibrotic base, probes to bone, +malodor, + seropurulent drainage, macerated border, periwound erythema, no crepitus or fluctuance   Neuro: Protective sensation absent   MSK: 5/5 muscle strength in all compartments no pain with ROM of R ankle or 1st MPJ, right medial lower leg tender to palpation       RADIOLOGY: X-ray right toes: WET READ: cortical erosion of distal phalanx great toe with pathologic fracture and surrounding soft tissue hypodensites (most likely due to soft tissue defect), chronic fracture base of 3rd proximal phalanx

## 2023-02-20 NOTE — H&P ADULT - ASSESSMENT
61yo F PMH CAD and MI s/p ETELVINA x2 (2012/2013), T2DM c/b peripheral neuropathy, HTN, PAD w/ b/l arterial stents, CVA (no residual deficits), mild early dementia presented w/ foul-smelling drainage from R great toe, imaging suspicious for OM, admitted for IV abx and likely amputation w/ podiatry.

## 2023-02-20 NOTE — ED PROVIDER NOTE - CLINICAL SUMMARY MEDICAL DECISION MAKING FREE TEXT BOX
62F PMH CAD w/ stents, DM, HTN, PAD w/ b/l arterial stents (cannot recall name of vascular surgeon), CVA (no residual), peripheral neuropathy, mild early dementia, PSHx cholecystectomy p/w several complaints. Main concern is RLE pain. Pt states she has had to have her 1st toenail removed several times. Now since ~4w ago she noticed increased swelling/discoloration to her 1st toe. Has no sensation there at baseline and has no pain to toe- however now has pain travelling from ankle upwards over last few days. Had visiting doctor evaluate and was referred to ED. Pt also notes ~1mo of intermittent pain/drainage from L nipple. States she had a mammogram a few weeks prior to that, unknown results. Also c/o elevated glucose levels. States she is adherent to her meds. Occasional nausea. No other systemic symptoms.   Vitals wnl, exam as above.  ddx: Cellulitis, possible osteo, less likely but possible abscess.   Labs, XR, empiric abx, podiatry consulted. Will reassess.

## 2023-02-20 NOTE — ED PROVIDER NOTE - PROGRESS NOTE DETAILS
[place]: WBC 13.7, Na 132 (normal corrected), Cl 95, glucose 351 (normal AG, bicarb, pH, BHB), albumin 3.2, alk phos 139, CRP 28.2, ESR pending, other labs grossly wnl. CXR wnl. XR toe concerning for osteo. Has very slight air on XR that likely corresponds to soft tissue defect, clinically unlikely nec fasc. Podiatry performed bedside debridement. Will admit for further care, likely osteo. Updated pt.

## 2023-02-20 NOTE — CONSULT NOTE ADULT - ASSESSMENT
A: 62F PMH CAD w/ stents, DM, HTN, PAD w/ left LE arterial stent, CVA (no residual), MI x 2 (most recent 2013), peripheral neuropathy, mild early dementia p/w right great toe wound x1 month. Podiatry consulted for right great toe wound. VSS, WBC 13.75, CRP 28.2, , X ray concerning for osteomyelitis of right great toe distal phalanx and chronic fracture of right 3rd proximal phalanx. Most likely diagnosis of right great toe osteomyelitis with overlying soft tissue infection.     Plan:   -Pt seen and evaluated   -X rays discussed with pt   -Aseptic excisional debridement of right great toe down to and including subcutaneous tissue via sterile scissor   -Deep wound cx taken in ED   -F/u Deep wound culture right great toe   -Rec admit to medicine for Osteomyelitis treatment   -Rec IV vanc zosyn   -Rec MRI right foot with contrast for operative planning   -Pt understands she will likely need and amputation of her right great toe however the level of amputation will be determined based on MRI result    -Will plan for amputation in OR after MRI results   -Heel WBAT to RLE     Plan d/w attending. Podiatry will follow.    A: 62F PMH CAD w/ stents, DM, HTN, PAD w/ left LE arterial stent, CVA (no residual), MI x 2 (most recent 2013), peripheral neuropathy, mild early dementia p/w right great toe wound x1 month. Podiatry consulted for right great toe wound. VSS, WBC 13.75, CRP 28.2, , X ray concerning for osteomyelitis of right great toe distal phalanx and chronic fracture of right 3rd proximal phalanx. Most likely diagnosis of right great toe osteomyelitis with overlying soft tissue infection.     Plan:   -Pt seen and evaluated   -X rays discussed with pt   -Aseptic excisional debridement of right great toe down to and including subcutaneous tissue via sterile scissor   -Wound flushed with saline dressed with betadine DSD  -Deep wound cx taken in ED   -F/u Deep wound culture right great toe   -Rec admit to medicine for Osteomyelitis treatment   -Rec IV vanc zosyn   -Rec MRI right foot with contrast for operative planning   -Pt understands she will likely need and amputation of her right great toe however the level of amputation will be determined based on MRI result    -Will plan for amputation in OR after MRI results   -Heel WBAT to RLE     Plan d/w attending. Podiatry will follow.

## 2023-02-20 NOTE — ED PROVIDER NOTE - CARE PLAN
1 Principal Discharge DX:	Cellulitis   Principal Discharge DX:	Cellulitis  Secondary Diagnosis:	Osteomyelitis

## 2023-02-20 NOTE — H&P ADULT - PROBLEM SELECTOR PLAN 6
Known Parkinson's dementia. Takes Neudexta (Dextromethorphan/Quinidine) 20mg/10mg 1 tab BID.  - holding inpatient as dextromethorphan not available

## 2023-02-20 NOTE — ED ADULT TRIAGE NOTE - PRO INTERPRETER NEED 2
Patient calls to office - requests call - states her cardiologist faxed over blood work orders - asking if she can do the blood work here - also wonders since she is on Vitamin D3 does she need to have bld work for this to see if absorbing and also asks about checking her magnesium levels - can Mercedes enter orders for these tests   English

## 2023-02-20 NOTE — H&P ADULT - PROBLEM SELECTOR PLAN 7
Known depression, takes Sertraline 150mg qAM and Clonazepam 0.5mg qd PRN at home.  - continue sertraline  - continue clonazepam PRN

## 2023-02-20 NOTE — H&P ADULT - PROBLEM SELECTOR PLAN 2
Known T2DM, takes Metformin 500mg BID and Insulin Basaglar 15u qd at home. States her most recent a1c has been ~12%. Also takes Gabapentin 300mg qd for neuropathy and Creon (Pancrelipase) 2 caps TID w/ meals at home.  - lantus 15u qhs  - mISS  - continue gabapentin and pancrelipase  - follow up AM a1c Known T2DM, takes Metformin 500mg BID and Insulin Basaglar 15u qd at home. States her most recent a1c has been ~12%. Also takes Gabapentin 300mg qd for neuropathy and Creon (Pancrelipase) 2 caps TID w/ meals at home.  - lispro 4u premeal - adding given report of elevated A1c and elevated finger sticks so far this hospitalization; DM is likely uncontrolled  - lantus 15u qhs  - mISS  - continue gabapentin and pancrelipase  - follow up AM a1c

## 2023-02-20 NOTE — H&P ADULT - ATTENDING COMMENTS
#R toe OM: p/w right great toe wound x1 month. Elevated esr/crp, X ray concerning for osteomyelitis of right great toe. s/p excissional debridement in ED by podiatry. c/w mohinder/zosyn. F/up MRI for operative planning for likely amputation. Pulses present b/l    #T2DM: on basalgar 15U daily, complaint however uncontrolled diabetes a1c 12%, start weight based, monitor FSG

## 2023-02-20 NOTE — H&P ADULT - NSHPSOCIALHISTORY_GEN_ALL_CORE
Lives in an apt in [place] with her cousin, and her mother has been staying with her. She does not work due to disability from "psychiatric issues". Ambulates without assistance and is independent for all ADLs.

## 2023-02-20 NOTE — H&P ADULT - PROBLEM SELECTOR PLAN 3
#CAD  Known CAD and 2 prior MIs s/p ETELVINA x2. Most recent MI in 2012/2013. Takes ASA 81mg qd, Plavix 75mg qd and Atorvastatin 40mg qhs at home.  - continue ASA, plavix, atorvastatin

## 2023-02-20 NOTE — H&P ADULT - PROBLEM SELECTOR PLAN 5
Known GERD, takes Omeprazole 20mg qAM at home.  - pantoprazole 40mg qAM inpatient therapeutic interchange

## 2023-02-20 NOTE — ED PROVIDER NOTE - PHYSICAL EXAMINATION
faint blanching erythema to dorsal foot, +warmth from mid shin extending to toes. lower shin region is ttp to light touch. R 1st toe is edematous and purplish/red dislocation, mild wetness to toenail region, scattered superficial breaks in skin.  normal equal DP pulses.   No crepitus, firmness, induration, fluctuance.   SHERRIE Rivero chaperone: L breast: no masses palpated, no nipple discharge, normal appearing skin.

## 2023-02-20 NOTE — PATIENT PROFILE ADULT - FALL HARM RISK - HARM RISK INTERVENTIONS

## 2023-02-20 NOTE — H&P ADULT - HISTORY OF PRESENT ILLNESS
61yo F PMH CAD and MI s/p ETELVINA x2 (2012/2013), T2DM c/b peripheral neuropathy, HTN, PAD w/ b/l arterial stents, CVA (no residual deficits), mild early dementia, PSHx cholecystectomy presenting w/ pus/foul-smelling drainage from R great toe. States she injured her toe as a child when a bottle fell on it, and it never fully healed. She also had a pedicure during which the same toe got burned and never healed from that. She also has peripheral neuropathy and does not have complete sensation in her toes/feet. Toe was somewhat painful and the pain extended up her ankle to her shin, but she was not in pain at the time of interview/exam. 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 She endorses some nausea earlier in the day but denies CP, SOB, fevers, chills, abdominal pain, V/D, constipation, changes to urinary habits,   Also endorses ~1 month of intermittent drainage from her L nipple. No drainage at this time. Had a mammogram a few weeks ago, she does not yet know the results.    ED course:  VS: T 98.8 F, HR 87, /76, RR 16, SpO2 94% on RA  Labs significant for: , CRP 28.2, WBCs 13.75, Plts 471, Na 132, Cl 95, Glucose 351, Albumin 3.2,   EKG: NSR HR 82, QTc 446  CXR: widened mediastinum, minimal pulmonary edema  XR R foot: bone erosion of R great toe  Interventions: Tylenol 650mg PO, Insulin lispro 5u, Zosyn 3.375g, Vancomycin 1g, NaCl 1L bolus  Consults: podiatry

## 2023-02-20 NOTE — H&P ADULT - NSHPLABSRESULTS_GEN_ALL_CORE
LABS:                         12.6   13.75 )-----------( 471      ( 20 Feb 2023 18:01 )             38.8     02-20    132<L>  |  95<L>  |  23  ----------------------------<  351<H>  5.1   |  27  |  1.18    Ca    9.4      20 Feb 2023 18:01  Phos  3.0     02-20  Mg     1.5     02-20    TPro  7.8  /  Alb  3.2<L>  /  TBili  0.2  /  DBili  x   /  AST  19  /  ALT  23  /  AlkPhos  139<H>  02-20    PT/INR - ( 20 Feb 2023 18:01 )   PT: 12.5 sec;   INR: 1.05          PTT - ( 20 Feb 2023 18:01 )  PTT:29.0 sec    CARDIAC MARKERS ( 20 Feb 2023 18:01 )  x     / x     / 47 U/L / x     / 1.6 ng/mL        Lactate, Blood: 1.8 mmol/L (02-20 @ 18:01)      RADIOLOGY, EKG & ADDITIONAL TESTS:

## 2023-02-21 ENCOUNTER — TRANSCRIPTION ENCOUNTER (OUTPATIENT)
Age: 63
End: 2023-02-21

## 2023-02-21 DIAGNOSIS — I73.9 PERIPHERAL VASCULAR DISEASE, UNSPECIFIED: ICD-10-CM

## 2023-02-21 LAB
A1C WITH ESTIMATED AVERAGE GLUCOSE RESULT: 12 % — HIGH (ref 4–5.6)
ALBUMIN SERPL ELPH-MCNC: 3.1 G/DL — LOW (ref 3.3–5)
ALP SERPL-CCNC: 114 U/L — SIGNIFICANT CHANGE UP (ref 40–120)
ALT FLD-CCNC: 18 U/L — SIGNIFICANT CHANGE UP (ref 10–45)
ANION GAP SERPL CALC-SCNC: 8 MMOL/L — SIGNIFICANT CHANGE UP (ref 5–17)
AST SERPL-CCNC: 17 U/L — SIGNIFICANT CHANGE UP (ref 10–40)
BASOPHILS # BLD AUTO: 0.07 K/UL — SIGNIFICANT CHANGE UP (ref 0–0.2)
BASOPHILS NFR BLD AUTO: 0.7 % — SIGNIFICANT CHANGE UP (ref 0–2)
BILIRUB SERPL-MCNC: 0.3 MG/DL — SIGNIFICANT CHANGE UP (ref 0.2–1.2)
BUN SERPL-MCNC: 21 MG/DL — SIGNIFICANT CHANGE UP (ref 7–23)
CALCIUM SERPL-MCNC: 9.1 MG/DL — SIGNIFICANT CHANGE UP (ref 8.4–10.5)
CHLORIDE SERPL-SCNC: 101 MMOL/L — SIGNIFICANT CHANGE UP (ref 96–108)
CO2 SERPL-SCNC: 27 MMOL/L — SIGNIFICANT CHANGE UP (ref 22–31)
CREAT SERPL-MCNC: 1.07 MG/DL — SIGNIFICANT CHANGE UP (ref 0.5–1.3)
EGFR: 59 ML/MIN/1.73M2 — LOW
EOSINOPHIL # BLD AUTO: 0.37 K/UL — SIGNIFICANT CHANGE UP (ref 0–0.5)
EOSINOPHIL NFR BLD AUTO: 3.5 % — SIGNIFICANT CHANGE UP (ref 0–6)
ESTIMATED AVERAGE GLUCOSE: 298 MG/DL — HIGH (ref 68–114)
GLUCOSE BLDC GLUCOMTR-MCNC: 178 MG/DL — HIGH (ref 70–99)
GLUCOSE BLDC GLUCOMTR-MCNC: 200 MG/DL — HIGH (ref 70–99)
GLUCOSE BLDC GLUCOMTR-MCNC: 209 MG/DL — HIGH (ref 70–99)
GLUCOSE BLDC GLUCOMTR-MCNC: 253 MG/DL — HIGH (ref 70–99)
GLUCOSE SERPL-MCNC: 111 MG/DL — HIGH (ref 70–99)
HCT VFR BLD CALC: 35.8 % — SIGNIFICANT CHANGE UP (ref 34.5–45)
HCV AB S/CO SERPL IA: 0.11 S/CO — SIGNIFICANT CHANGE UP
HCV AB SERPL-IMP: SIGNIFICANT CHANGE UP
HGB BLD-MCNC: 11.5 G/DL — SIGNIFICANT CHANGE UP (ref 11.5–15.5)
IMM GRANULOCYTES NFR BLD AUTO: 0.6 % — SIGNIFICANT CHANGE UP (ref 0–0.9)
LYMPHOCYTES # BLD AUTO: 2.71 K/UL — SIGNIFICANT CHANGE UP (ref 1–3.3)
LYMPHOCYTES # BLD AUTO: 25.8 % — SIGNIFICANT CHANGE UP (ref 13–44)
MAGNESIUM SERPL-MCNC: 1.9 MG/DL — SIGNIFICANT CHANGE UP (ref 1.6–2.6)
MCHC RBC-ENTMCNC: 26.9 PG — LOW (ref 27–34)
MCHC RBC-ENTMCNC: 32.1 GM/DL — SIGNIFICANT CHANGE UP (ref 32–36)
MCV RBC AUTO: 83.6 FL — SIGNIFICANT CHANGE UP (ref 80–100)
MONOCYTES # BLD AUTO: 0.69 K/UL — SIGNIFICANT CHANGE UP (ref 0–0.9)
MONOCYTES NFR BLD AUTO: 6.6 % — SIGNIFICANT CHANGE UP (ref 2–14)
NEUTROPHILS # BLD AUTO: 6.6 K/UL — SIGNIFICANT CHANGE UP (ref 1.8–7.4)
NEUTROPHILS NFR BLD AUTO: 62.8 % — SIGNIFICANT CHANGE UP (ref 43–77)
NRBC # BLD: 0 /100 WBCS — SIGNIFICANT CHANGE UP (ref 0–0)
PHOSPHATE SERPL-MCNC: 3.8 MG/DL — SIGNIFICANT CHANGE UP (ref 2.5–4.5)
PLATELET # BLD AUTO: 456 K/UL — HIGH (ref 150–400)
POTASSIUM SERPL-MCNC: 4.3 MMOL/L — SIGNIFICANT CHANGE UP (ref 3.5–5.3)
POTASSIUM SERPL-SCNC: 4.3 MMOL/L — SIGNIFICANT CHANGE UP (ref 3.5–5.3)
PROT SERPL-MCNC: 6.8 G/DL — SIGNIFICANT CHANGE UP (ref 6–8.3)
RBC # BLD: 4.28 M/UL — SIGNIFICANT CHANGE UP (ref 3.8–5.2)
RBC # FLD: 15.9 % — HIGH (ref 10.3–14.5)
SODIUM SERPL-SCNC: 136 MMOL/L — SIGNIFICANT CHANGE UP (ref 135–145)
WBC # BLD: 10.5 K/UL — SIGNIFICANT CHANGE UP (ref 3.8–10.5)
WBC # FLD AUTO: 10.5 K/UL — SIGNIFICANT CHANGE UP (ref 3.8–10.5)

## 2023-02-21 PROCEDURE — 73719 MRI LOWER EXTREMITY W/DYE: CPT | Mod: 26,RT

## 2023-02-21 PROCEDURE — 99233 SBSQ HOSP IP/OBS HIGH 50: CPT | Mod: GC

## 2023-02-21 RX ORDER — INSULIN LISPRO 100/ML
5 VIAL (ML) SUBCUTANEOUS
Refills: 0 | Status: DISCONTINUED | OUTPATIENT
Start: 2023-02-21 | End: 2023-02-23

## 2023-02-21 RX ORDER — ENOXAPARIN SODIUM 100 MG/ML
40 INJECTION SUBCUTANEOUS EVERY 12 HOURS
Refills: 0 | Status: DISCONTINUED | OUTPATIENT
Start: 2023-02-21 | End: 2023-02-23

## 2023-02-21 RX ORDER — ACETAMINOPHEN 500 MG
650 TABLET ORAL EVERY 6 HOURS
Refills: 0 | Status: DISCONTINUED | OUTPATIENT
Start: 2023-02-21 | End: 2023-02-23

## 2023-02-21 RX ORDER — INSULIN LISPRO 100/ML
4 VIAL (ML) SUBCUTANEOUS
Refills: 0 | Status: DISCONTINUED | OUTPATIENT
Start: 2023-02-21 | End: 2023-02-21

## 2023-02-21 RX ADMIN — PIPERACILLIN AND TAZOBACTAM 25 GRAM(S): 4; .5 INJECTION, POWDER, LYOPHILIZED, FOR SOLUTION INTRAVENOUS at 02:00

## 2023-02-21 RX ADMIN — PANTOPRAZOLE SODIUM 40 MILLIGRAM(S): 20 TABLET, DELAYED RELEASE ORAL at 06:33

## 2023-02-21 RX ADMIN — Medication 6: at 22:39

## 2023-02-21 RX ADMIN — Medication 2 CAPSULE(S): at 10:54

## 2023-02-21 RX ADMIN — Medication 300 MILLIGRAM(S): at 19:29

## 2023-02-21 RX ADMIN — Medication 300 MILLIGRAM(S): at 06:33

## 2023-02-21 RX ADMIN — SERTRALINE 150 MILLIGRAM(S): 25 TABLET, FILM COATED ORAL at 06:32

## 2023-02-21 RX ADMIN — Medication 4 UNIT(S): at 13:28

## 2023-02-21 RX ADMIN — CLOPIDOGREL BISULFATE 75 MILLIGRAM(S): 75 TABLET, FILM COATED ORAL at 06:32

## 2023-02-21 RX ADMIN — ATORVASTATIN CALCIUM 40 MILLIGRAM(S): 80 TABLET, FILM COATED ORAL at 22:38

## 2023-02-21 RX ADMIN — Medication 5 UNIT(S): at 19:29

## 2023-02-21 RX ADMIN — Medication 2 CAPSULE(S): at 19:28

## 2023-02-21 RX ADMIN — Medication 2: at 13:28

## 2023-02-21 RX ADMIN — Medication 81 MILLIGRAM(S): at 06:32

## 2023-02-21 RX ADMIN — PIPERACILLIN AND TAZOBACTAM 25 GRAM(S): 4; .5 INJECTION, POWDER, LYOPHILIZED, FOR SOLUTION INTRAVENOUS at 13:47

## 2023-02-21 RX ADMIN — INSULIN GLARGINE 15 UNIT(S): 100 INJECTION, SOLUTION SUBCUTANEOUS at 22:39

## 2023-02-21 RX ADMIN — Medication 4: at 19:28

## 2023-02-21 RX ADMIN — Medication 2 CAPSULE(S): at 13:34

## 2023-02-21 RX ADMIN — GABAPENTIN 300 MILLIGRAM(S): 400 CAPSULE ORAL at 06:33

## 2023-02-21 RX ADMIN — ENOXAPARIN SODIUM 40 MILLIGRAM(S): 100 INJECTION SUBCUTANEOUS at 22:40

## 2023-02-21 RX ADMIN — Medication 25 MILLIGRAM(S): at 19:29

## 2023-02-21 RX ADMIN — PIPERACILLIN AND TAZOBACTAM 25 GRAM(S): 4; .5 INJECTION, POWDER, LYOPHILIZED, FOR SOLUTION INTRAVENOUS at 22:38

## 2023-02-21 RX ADMIN — Medication 4 UNIT(S): at 09:35

## 2023-02-21 NOTE — CONSULT NOTE ADULT - SUBJECTIVE AND OBJECTIVE BOX
HISTORY OF PRESENT ILLNESS  Pearl Dee is a 62-year-old female with a past medical history of type 2 diabetes mellitus, coronary artery disease (s/p ETELVINA x2 on 2012/2013) and mild dementia who presented with pus/foul-smelling drainage from her right great toe. She reported having an old injury to her toe that never healed completely. In addition, she mentioned having peripheral neuropathy with decreased sensation in her toes/feet. Labs were remarkable for hyperglycemia (351 mg/dL), elevated ESR (101) and CRP (28.2). Right foot x-ray showed bone erosion of the right great toe. The patient was admitted under impression of osteomyelitis. Endocrinology consulted for recommendations regarding diabetes management.     CAPILLARY BLOOD GLUCOSE & INSULIN RECEIVED  351 mg/dL (02-20 @ 18:01) -> 5 units of lispro subcutaneously.   342 mg/dL (02-20 @ 19:35) -> Ø  371 mg/dL (02-20 @ 21:52) -> 10 units of lispro sliding scale.   274 mg/dL (02-20 @ 23:18) -> 15 units of lantus.   111 mg/dL (02-21 @ 05:30) -> Ø  136 mg/dL (02-21 @ 09:25) -> 4 units of lispro.     DIABETES HISTORY  - Age at diagnosis:   - Symptoms at time of diagnosis:   - Current Therapy:  - History of other regimens:   - History of hypoglycemia:   - History of DKA/HHS:   - Complications:   - Home FSG:        > Fasting: *** mg/dL.        > Before meals: *** mg/dL.        > Bedtime: *** mg/dL.  - Diet:          > Breakfast:         > Lunch:        > Dinner:        > Snacks:  - Physical activity:    - Outpatient follow-up:     PAST MEDICAL & SURGICAL HISTORY  As per history of present illness.     FAMILY HISTORY  - Diabetes:  - Thyroid:  - Autoimmune:  - Other:    SOCIAL HISTORY  - Work:  - Alcohol:  - Smoking:  - Recreational Drugs:    ALLERGIES  No Known Allergies    CURRENT MEDICATIONS  acetaminophen     Tablet .. 650 milliGRAM(s) Oral every 6 hours PRN  aspirin enteric coated 81 milliGRAM(s) Oral every 24 hours  atorvastatin 40 milliGRAM(s) Oral at bedtime  clonazePAM  Tablet 0.5 milliGRAM(s) Oral every 24 hours PRN  clopidogrel Tablet 75 milliGRAM(s) Oral every 24 hours  dextrose 5%. 1000 milliLiter(s) IV Continuous <Continuous>  dextrose 5%. 1000 milliLiter(s) IV Continuous <Continuous>  dextrose 50% Injectable 25 Gram(s) IV Push once  dextrose 50% Injectable 12.5 Gram(s) IV Push once  dextrose 50% Injectable 25 Gram(s) IV Push once  dextrose Oral Gel 15 Gram(s) Oral once PRN  enoxaparin Injectable 40 milliGRAM(s) SubCutaneous every 24 hours  gabapentin 300 milliGRAM(s) Oral every 24 hours  glucagon  Injectable 1 milliGRAM(s) IntraMuscular once  hydrochlorothiazide 25 milliGRAM(s) Oral every 24 hours  influenza   Vaccine 0.5 milliLiter(s) IntraMuscular once  insulin glargine Injectable (LANTUS) 15 Unit(s) SubCutaneous at bedtime  insulin lispro (ADMELOG) corrective regimen sliding scale   SubCutaneous Before meals and at bedtime  insulin lispro Injectable (ADMELOG) 4 Unit(s) SubCutaneous three times a day before meals  metoprolol tartrate 25 milliGRAM(s) Oral every 12 hours  nicotine - 21 mG/24Hr(s) Patch 1 Patch Transdermal daily  pancrelipase  (CREON 36,000 Lipase Units) 2 Capsule(s) Oral three times a day with meals  pantoprazole    Tablet 40 milliGRAM(s) Oral before breakfast  piperacillin/tazobactam IVPB.. 4.5 Gram(s) IV Intermittent every 8 hours  sertraline 150 milliGRAM(s) Oral every 24 hours  vancomycin  IVPB 1500 milliGRAM(s) IV Intermittent every 12 hours    REVIEW OF SYSTEMS  Constitutional:  Negative fever, chills or loss of appetite.  Eyes:  Negative blurry vision or double vision.  Cardiovascular:  Negative for chest pain or palpitations.  Respiratory:  Negative for cough, wheezing, or shortness of breath.   Gastrointestinal:  Negative for nausea, vomiting, diarrhea, constipation, or abdominal pain.  Genitourinary:  Negative frequency, urgency or dysuria.  Neurologic:  No headache, confusion, dizziness, lightheadedness.    PHYSICAL EXAM  Vital Signs Last 24 Hrs  T(C): 36.7 (21 Feb 2023 06:04), Max: 37.1 (20 Feb 2023 17:28)  T(F): 98 (21 Feb 2023 06:04), Max: 98.8 (20 Feb 2023 17:28)  HR: 72 (21 Feb 2023 06:04) (72 - 88)  BP: 135/86 (21 Feb 2023 06:04) (112/76 - 135/86)  BP(mean): --  RR: 17 (21 Feb 2023 06:04) (16 - 17)  SpO2: 94% (21 Feb 2023 06:04) (94% - 98%)    Parameters below as of 21 Feb 2023 06:04  Patient On (Oxygen Delivery Method): room air    Constitutional: Awake, alert, in no acute distress.   HEENT: Normocephalic, atraumatic, PATRICK, no proptosis or lid retraction.   Neck: supple, no acanthosis, no thyromegaly or palpable thyroid nodules.  Respiratory: Lungs clear to ausculation bilaterally.   Cardiovascular: regular rhythm, normal S1 and S2, no audible murmurs.   GI: soft, non-tender, non-distended, bowel sounds present, no masses appreciated.  Extremities: No lower extremity edema, peripheral pulses present.   Skin: no rashes.   Psychiatric: AAO x 3. Normal affect/mood.     LABS  CBC - WBC/HGB/HTC/PLT: 10.50/11.5/35.8/456 (02-21-23)  BMP: Na/K/Cl/Bicarb/BUN/Cr/Gluc: 136/4.3/101/27/21/1.07/111 (02-21-23)  Anion Gap: 8 (02-21-23)  eGFR: 59 (02-21-23)  Calcium: 9.1 (02-21-23)  Phosphorus: 3.8 (02-21-23)  Magnesium: 1.9 (02-21-23)  LFT - Alb/Tprot/Tbili/Dbili/AlkPhos/ALT/AST: 3.1/--/0.3/--/114/18/17 (02-21-23)  PT/aPTT/INR: 12.5/29.0/1.05 (02-20-23)      ASSESSMENT / RECOMMENDATIONS    A1C: 12.0 %  BUN: 21  Creatinine: 1.07  GFR: 59  Weight: 95.3  BMI: 36  EF:     # Type 2 diabetes mellitus  - Please continue lantus *** units at bedtime.   - Continue lispro *** units before each meal.  - Continue lispro moderate / low dose sliding scale before meals and at bedtime.  - Patient's fingerstick glucose goal is 100-180 mg/dL.    - For discharge, patient can ***.    - Patient can follow up at discharge with [facility] by calling [phone] to make an appointment.      Case discussed with Dr. Florian. Primary team updated.       Everett Meza    Endocrinology Fellow    Service Pager: [phone]  HISTORY OF PRESENT ILLNESS  Pearl Dee is a 62-year-old female with a past medical history of type 2 diabetes mellitus, coronary artery disease (s/p ETELVINA x2 on ) and mild dementia who presented with pus/foul-smelling drainage from her right great toe. She reported having an old injury to her toe that never healed completely. In addition, she mentioned having peripheral neuropathy with decreased sensation in her toes/feet. Labs were remarkable for hyperglycemia (351 mg/dL), elevated ESR (101) and CRP (28.2). Right foot x-ray showed bone erosion of the right great toe. The patient was admitted under impression of osteomyelitis. Endocrinology consulted for recommendations regarding diabetes management.     CAPILLARY BLOOD GLUCOSE & INSULIN RECEIVED  351 mg/dL ( @ 18:01) -> 5 units of lispro subcutaneously.   342 mg/dL ( @ 19:35) -> Ø  371 mg/dL ( @ 21:52) -> 10 units of lispro sliding scale.   274 mg/dL ( @ 23:18) -> 15 units of lantus.   111 mg/dL ( @ 05:30) -> Ø  136 mg/dL ( @ 09:25) -> 4 units of lispro.   178 mg/dL ( @ 12:57) -> 4 units of lispro + 2 units of sliding scale.     DIABETES HISTORY  - Age at diagnosis: ~30 years ago.  - Symptoms at time of diagnosis: Asymptomatic, found on routine labs.   - Current Therapy: She was initially started on metformin and glyburide. Glyburide was stopped many years ago and she doesn't remember why. In addition, she was taking Trulicity for 2-3 years which was stopped ~5 months ago when she changed medical providers. She was never restarted on Trulicity nor she knows why the medication was stopped. The patient denied having any side effects from the medication. She was started on a long-acting insulin ~20 years ago. Currently, her home regimen consists of metformin 500 mg twice daily and a daily dose of 15 units of long-acting insulin. She reports missing her medications frequently throughout the week (usually misses 4 out of 7 days of the week). She usually injects the insulin whenever she remembers to do it, or when she finds that her blood glucose is elevated on a random fingerstick (she could take the insulin in the morning, afternoon or evening).   - History of hypoglycemia: Denied.   - History of DKA/HHS: Denied.   - Complications: She has diabetic retinopathy (s/p laser surgery ~5 years ago, has not seen an ophthalmologist in more than one year) and diabetic neuropathy (experiences numbness and tingling of feet).   - Home FSG: She checks random blood glucose levels in the middle of the afternoon. They are usually in the 300s mg/dL.   - Diet:          > Breakfast: Coffee + crackers.         > Lunch: Oatmeal or spaghetti with shrimps.        > Dinner: [place] or mac & cheese.         > Snacks: Drinks many glasses of regular soda throughout the day. She says that sometimes she drinks a whole 2-liter container of coke. In addition, she drinks 3-4 cups of coffee per day, and adds ~3 spoons of brown sugar with each cup. She sometimes likes to eat [place] chocolats with almonds or M&Ms with peanuts.   - Outpatient follow-up: PCP, last seen in 2022.     PAST MEDICAL & SURGICAL HISTORY  As per history of present illness.     FAMILY HISTORY  - Diabetes: Father, grandmother (paternal), grandfather (paternal) and aunt (maternal).   - Thyroid: Mother (hyperthyroidism).   - Autoimmune: Denied.   - Other: Father had throat cancer.    SOCIAL HISTORY  - Work: Unemployed.   - Alcohol: Denied.   - Smokin packs per day for the past ~49 years.   - Recreational Drugs: Denied.     ALLERGIES  No Known Allergies    CURRENT MEDICATIONS  acetaminophen     Tablet .. 650 milliGRAM(s) Oral every 6 hours PRN  aspirin enteric coated 81 milliGRAM(s) Oral every 24 hours  atorvastatin 40 milliGRAM(s) Oral at bedtime  clonazePAM  Tablet 0.5 milliGRAM(s) Oral every 24 hours PRN  clopidogrel Tablet 75 milliGRAM(s) Oral every 24 hours  dextrose 5%. 1000 milliLiter(s) IV Continuous <Continuous>  dextrose 5%. 1000 milliLiter(s) IV Continuous <Continuous>  dextrose 50% Injectable 25 Gram(s) IV Push once  dextrose 50% Injectable 12.5 Gram(s) IV Push once  dextrose 50% Injectable 25 Gram(s) IV Push once  dextrose Oral Gel 15 Gram(s) Oral once PRN  enoxaparin Injectable 40 milliGRAM(s) SubCutaneous every 24 hours  gabapentin 300 milliGRAM(s) Oral every 24 hours  glucagon  Injectable 1 milliGRAM(s) IntraMuscular once  hydrochlorothiazide 25 milliGRAM(s) Oral every 24 hours  influenza   Vaccine 0.5 milliLiter(s) IntraMuscular once  insulin glargine Injectable (LANTUS) 15 Unit(s) SubCutaneous at bedtime  insulin lispro (ADMELOG) corrective regimen sliding scale   SubCutaneous Before meals and at bedtime  insulin lispro Injectable (ADMELOG) 4 Unit(s) SubCutaneous three times a day before meals  metoprolol tartrate 25 milliGRAM(s) Oral every 12 hours  nicotine - 21 mG/24Hr(s) Patch 1 Patch Transdermal daily  pancrelipase  (CREON 36,000 Lipase Units) 2 Capsule(s) Oral three times a day with meals  pantoprazole    Tablet 40 milliGRAM(s) Oral before breakfast  piperacillin/tazobactam IVPB.. 4.5 Gram(s) IV Intermittent every 8 hours  sertraline 150 milliGRAM(s) Oral every 24 hours  vancomycin  IVPB 1500 milliGRAM(s) IV Intermittent every 12 hours    REVIEW OF SYSTEMS  Constitutional:  Negative fever, chills or loss of appetite.  Eyes:  Negative blurry vision or double vision.  Cardiovascular:  Negative for chest pain or palpitations.  Respiratory:  Negative for cough, wheezing, or shortness of breath.   Gastrointestinal:  Negative for nausea, vomiting, diarrhea, constipation, or abdominal pain.  Genitourinary:  Negative frequency, urgency or dysuria.  Neurologic:  No headache, confusion, dizziness, lightheadedness.    PHYSICAL EXAM  Vital Signs Last 24 Hrs  T(C): 36.7 (2023 06:04), Max: 37.1 (2023 17:28)  T(F): 98 (2023 06:04), Max: 98.8 (2023 17:28)  HR: 72 (2023 06:04) (72 - 88)  BP: 135/86 (2023 06:04) (112/76 - 135/86)  BP(mean): --  RR: 17 (2023 06:04) (16 - 17)  SpO2: 94% (2023 06:04) (94% - 98%)    Parameters below as of 2023 06:04  Patient On (Oxygen Delivery Method): room air    Constitutional: Awake, alert, in no acute distress.   HEENT: Normocephalic, atraumatic, PATRICK, no proptosis or lid retraction.   Neck: supple, no acanthosis, no thyromegaly or palpable thyroid nodules.  Respiratory: Lungs clear to ausculation bilaterally.   Cardiovascular: regular rhythm, normal S1 and S2, no audible murmurs.   GI: soft, non-tender, non-distended, bowel sounds present, no masses appreciated.  Extremities: No lower extremity edema, peripheral pulses present.   Skin: no rashes.   Psychiatric: AAO x 3. Normal affect/mood.     LABS  CBC - WBC/HGB/HTC/PLT: 10.50/11.5/35.8/456 (23)  BMP: Na/K/Cl/Bicarb/BUN/Cr/Gluc: 136/4.3/101/27/21/1.07/111 (23)  Anion Gap: 8 (23)  eGFR: 59 (23)  Calcium: 9.1 (23)  Phosphorus: 3.8 (23)  Magnesium: 1.9 (23)  LFT - Alb/Tprot/Tbili/Dbili/AlkPhos/ALT/AST: 3.1/--/0.3/--/114/18/17 (23)  PT/aPTT/INR: 12.5/29.0/1.05 (23)    ASSESSMENT / RECOMMENDATIONS    A1C: 12.0 %  BUN: 21  Creatinine: 1.07  GFR: 59  Weight: 95.3  BMI: 36  EF:     # Type 2 diabetes mellitus  - Please continue lantus *** units at bedtime.   - Continue lispro *** units before each meal.  - Continue lispro moderate / low dose sliding scale before meals and at bedtime.  - Patient's fingerstick glucose goal is 100-180 mg/dL.    - For discharge, patient can ***.    - Patient can follow up at discharge with [facility] by calling [phone] to make an appointment.      Case discussed with Dr. Florian. Primary team updated.       Everett Meza    Endocrinology Fellow    Service Pager: [phone]  HISTORY OF PRESENT ILLNESS  Pearl Dee is a 62-year-old female with a past medical history of type 2 diabetes mellitus, coronary artery disease (s/p ETELVINA x2 on ) and mild dementia who presented with pus/foul-smelling drainage from her right great toe. She reported having an old injury to her toe that never healed completely. In addition, she mentioned having peripheral neuropathy with decreased sensation in her toes/feet. Labs were remarkable for hyperglycemia (351 mg/dL), elevated ESR (101) and CRP (28.2). Right foot x-ray showed bone erosion of the right great toe. The patient was admitted under impression of osteomyelitis. Endocrinology consulted for recommendations regarding diabetes management.     CAPILLARY BLOOD GLUCOSE & INSULIN RECEIVED  351 mg/dL ( @ 18:01) -> 5 units of lispro subcutaneously.   342 mg/dL ( @ 19:35) -> Ø  371 mg/dL ( @ 21:52) -> 10 units of lispro sliding scale.   274 mg/dL ( @ 23:18) -> 15 units of lantus.   111 mg/dL ( @ 05:30) -> Ø  136 mg/dL ( @ 09:25) -> 4 units of lispro.   178 mg/dL ( @ 12:57) -> 4 units of lispro + 2 units of sliding scale.     DIABETES HISTORY  - Age at diagnosis: ~30 years ago.  - Symptoms at time of diagnosis: Asymptomatic, found on routine labs.   - Current Therapy: She was initially started on metformin and glyburide. Glyburide was stopped many years ago and she doesn't remember why. In addition, she was taking Trulicity for 2-3 years which was stopped ~5 months ago when she changed medical providers. She was never restarted on Trulicity nor she knows why the medication was stopped. The patient denied having any side effects from the medication. She was started on a long-acting insulin ~20 years ago. Currently, her home regimen consists of metformin 500 mg twice daily and a daily dose of 15 units of long-acting insulin. She reports missing her medications frequently throughout the week (usually misses 4 out of 7 days of the week). She usually injects the insulin whenever she remembers to do it, or when she finds that her blood glucose is elevated on a random fingerstick (she could take the insulin in the morning, afternoon or evening).   - History of hypoglycemia: Denied.   - History of DKA/HHS: Denied.   - Complications: She has diabetic retinopathy (s/p laser surgery ~5 years ago, has not seen an ophthalmologist in more than one year) and diabetic neuropathy (experiences numbness and tingling of feet).   - Home FSG: She checks random blood glucose levels in the middle of the afternoon. They are usually in the 300s mg/dL.   - Diet:          > Breakfast: Coffee + crackers.         > Lunch: Oatmeal or spaghetti with shrimps.        > Dinner: [place] or mac & cheese.         > Snacks: Drinks many glasses of regular soda throughout the day. She says that sometimes she drinks a whole 2-liter container of coke. In addition, she drinks 3-4 cups of coffee per day, and adds ~3 spoons of brown sugar with each cup. She sometimes likes to eat [place] chocolats with almonds or M&Ms with peanuts.   - Outpatient follow-up: PCP, last seen in 2022.     PAST MEDICAL & SURGICAL HISTORY  As per history of present illness.     FAMILY HISTORY  - Diabetes: Father, grandmother (paternal), grandfather (paternal) and aunt (maternal).   - Thyroid: Mother (hyperthyroidism).   - Autoimmune: Denied.   - Other: Father had throat cancer.    SOCIAL HISTORY  - Work: Unemployed.   - Alcohol: Denied.   - Smokin packs per day for the past ~49 years.   - Recreational Drugs: Denied.     ALLERGIES  No Known Allergies    CURRENT MEDICATIONS  acetaminophen     Tablet .. 650 milliGRAM(s) Oral every 6 hours PRN  aspirin enteric coated 81 milliGRAM(s) Oral every 24 hours  atorvastatin 40 milliGRAM(s) Oral at bedtime  clonazePAM  Tablet 0.5 milliGRAM(s) Oral every 24 hours PRN  clopidogrel Tablet 75 milliGRAM(s) Oral every 24 hours  dextrose 5%. 1000 milliLiter(s) IV Continuous <Continuous>  dextrose 5%. 1000 milliLiter(s) IV Continuous <Continuous>  dextrose 50% Injectable 25 Gram(s) IV Push once  dextrose 50% Injectable 12.5 Gram(s) IV Push once  dextrose 50% Injectable 25 Gram(s) IV Push once  dextrose Oral Gel 15 Gram(s) Oral once PRN  enoxaparin Injectable 40 milliGRAM(s) SubCutaneous every 24 hours  gabapentin 300 milliGRAM(s) Oral every 24 hours  glucagon  Injectable 1 milliGRAM(s) IntraMuscular once  hydrochlorothiazide 25 milliGRAM(s) Oral every 24 hours  influenza   Vaccine 0.5 milliLiter(s) IntraMuscular once  insulin glargine Injectable (LANTUS) 15 Unit(s) SubCutaneous at bedtime  insulin lispro (ADMELOG) corrective regimen sliding scale   SubCutaneous Before meals and at bedtime  insulin lispro Injectable (ADMELOG) 4 Unit(s) SubCutaneous three times a day before meals  metoprolol tartrate 25 milliGRAM(s) Oral every 12 hours  nicotine - 21 mG/24Hr(s) Patch 1 Patch Transdermal daily  pancrelipase  (CREON 36,000 Lipase Units) 2 Capsule(s) Oral three times a day with meals  pantoprazole    Tablet 40 milliGRAM(s) Oral before breakfast  piperacillin/tazobactam IVPB.. 4.5 Gram(s) IV Intermittent every 8 hours  sertraline 150 milliGRAM(s) Oral every 24 hours  vancomycin  IVPB 1500 milliGRAM(s) IV Intermittent every 12 hours    REVIEW OF SYSTEMS  Constitutional:  Negative fever, chills or loss of appetite.  Eyes:  Negative blurry vision or double vision.  Cardiovascular:  Negative for chest pain or palpitations.  Respiratory:  Negative for cough, wheezing, or shortness of breath.   Gastrointestinal:  Negative for nausea, vomiting, diarrhea, constipation, or abdominal pain.  Neurologic:  No headache, confusion, dizziness, lightheadedness.    PHYSICAL EXAM  Vital Signs Last 24 Hrs  T(C): 36.7 (2023 06:04), Max: 37.1 (2023 17:28)  T(F): 98 (2023 06:04), Max: 98.8 (2023 17:28)  HR: 72 (2023 06:04) (72 - 88)  BP: 135/86 (2023 06:04) (112/76 - 135/86)  BP(mean): --  RR: 17 (2023 06:04) (16 - 17)  SpO2: 94% (2023 06:04) (94% - 98%)    Parameters below as of 2023 06:04  Patient On (Oxygen Delivery Method): room air    Constitutional: Awake, alert, obese female, in no acute distress.   HEENT: Normocephalic, atraumatic, PATRICK.  Respiratory: Lungs clear to ausculation bilaterally.   Cardiovascular: regular rhythm, normal S1 and S2, no audible murmurs.   GI: soft, non-tender, non-distended, bowel sounds present, no masses appreciated.  Extremities: No lower extremity edema. Right leg warmer than left. Right foot wrapped on gauze.   Psychiatric: AAO x 3. Normal affect/mood.     LABS  CBC - WBC/HGB/HTC/PLT: 10.50/11.5/35.8/456 (23)  BMP: Na/K/Cl/Bicarb/BUN/Cr/Gluc: 136/4.3/101/27/21/1.07/111 (23)  Anion Gap: 8 (23)  eGFR: 59 (23)  Calcium: 9.1 (23)  Phosphorus: 3.8 (23)  Magnesium: 1.9 (23)  LFT - Alb/Tprot/Tbili/Dbili/AlkPhos/ALT/AST: 3.1/--/0.3/--/114/18/17 (23)  PT/aPTT/INR: 12.5/29.0/1.05 (23)    ASSESSMENT / RECOMMENDATIONS  Ms. Dee is a 62-year-old female with a past medical history of type 2 diabetes mellitus, coronary artery disease (s/p ETELVINA x2 on ) and mild dementia who presented with pus/foul-smelling drainage from her right great toe. She was admitted under impression of right great toe osteomyelitis. Endocrinology consulted for recommendations regarding diabetes management.     A1C: 12.0 %  BUN: 21  Creatinine: 1.07  GFR: 59  Weight: 95.3  BMI: 36    # Type 2 diabetes mellitus  - Please continue lantus 15 units at bedtime.   - Increase lispro to 5 units before each meal.  - Continue lispro moderate dose sliding scale before meals and at bedtime.  - Patient's fingerstick glucose goal is 100-180 mg/dL.      Case discussed with Dr. Stanford. Primary team updated.       Everett Meza    Endocrinology Fellow    Service Pager: [phone]

## 2023-02-21 NOTE — PROGRESS NOTE ADULT - SUBJECTIVE AND OBJECTIVE BOX
OVERNIGHT EVENTS: RITA    SUBJECTIVE / INTERVAL HPI: Patient seen and examined at bedside. HPI obtained as mentioned in the chart. Per patient, has two cardiac stents and 3 leg stents. Most recent cardiac stent placed 3 months ago at [facility]. Most recent leg stent placed 2 weeks ago and on plavix. Feeling well, agreeable to possible amputation. Pain localized to right ankle. Denies fever, chills, SOB, N/V/D/C.    VITAL SIGNS:  Vital Signs Last 24 Hrs  T(C): 36.7 (21 Feb 2023 06:04), Max: 37.1 (20 Feb 2023 17:28)  T(F): 98 (21 Feb 2023 06:04), Max: 98.8 (20 Feb 2023 17:28)  HR: 72 (21 Feb 2023 06:04) (72 - 88)  BP: 135/86 (21 Feb 2023 06:04) (112/76 - 135/86)  BP(mean): --  RR: 17 (21 Feb 2023 06:04) (16 - 17)  SpO2: 94% (21 Feb 2023 06:04) (94% - 98%)    Parameters below as of 21 Feb 2023 06:04  Patient On (Oxygen Delivery Method): room air      I&O's Summary      PHYSICAL EXAM:    General: NAD, sitting up in bed  HEENT: head NC/AT, no conjunctival injection, EOMI, MMM  Neck: supple  Cardiac: RRR, +S1/S2, no murmurs  Respiratory: lungs CTAB in upper fields, diminished breath sounds in b/l bases suspect 2/2 body habitus  Abdomen: normal bowel sounds, soft, NT, ND  Extremities: WWP, no LE edema, R 1cm wound nonpurulent drainage, erythematous around base, nontender  Vascular: 1+ radial pulses b/l, 2+ L DP, 1+ R PT pulse  Neuro: A&Ox3, no focal deficits; 5/5 strength b/l; decreased sensation below calf b/l    MEDICATIONS:  MEDICATIONS  (STANDING):  aspirin enteric coated 81 milliGRAM(s) Oral every 24 hours  atorvastatin 40 milliGRAM(s) Oral at bedtime  clopidogrel Tablet 75 milliGRAM(s) Oral every 24 hours  dextrose 5%. 1000 milliLiter(s) (100 mL/Hr) IV Continuous <Continuous>  dextrose 5%. 1000 milliLiter(s) (50 mL/Hr) IV Continuous <Continuous>  dextrose 50% Injectable 25 Gram(s) IV Push once  dextrose 50% Injectable 12.5 Gram(s) IV Push once  dextrose 50% Injectable 25 Gram(s) IV Push once  enoxaparin Injectable 40 milliGRAM(s) SubCutaneous every 24 hours  gabapentin 300 milliGRAM(s) Oral every 24 hours  glucagon  Injectable 1 milliGRAM(s) IntraMuscular once  hydrochlorothiazide 25 milliGRAM(s) Oral every 24 hours  influenza   Vaccine 0.5 milliLiter(s) IntraMuscular once  insulin glargine Injectable (LANTUS) 15 Unit(s) SubCutaneous at bedtime  insulin lispro (ADMELOG) corrective regimen sliding scale   SubCutaneous Before meals and at bedtime  insulin lispro Injectable (ADMELOG) 4 Unit(s) SubCutaneous three times a day before meals  metoprolol tartrate 25 milliGRAM(s) Oral every 12 hours  nicotine - 21 mG/24Hr(s) Patch 1 Patch Transdermal daily  pancrelipase  (CREON 36,000 Lipase Units) 2 Capsule(s) Oral three times a day with meals  pantoprazole    Tablet 40 milliGRAM(s) Oral before breakfast  piperacillin/tazobactam IVPB.. 4.5 Gram(s) IV Intermittent every 8 hours  sertraline 150 milliGRAM(s) Oral every 24 hours  vancomycin  IVPB 1500 milliGRAM(s) IV Intermittent every 12 hours    MEDICATIONS  (PRN):  acetaminophen     Tablet .. 650 milliGRAM(s) Oral every 6 hours PRN Mild Pain (1 - 3)  clonazePAM  Tablet 0.5 milliGRAM(s) Oral every 24 hours PRN anxiety  dextrose Oral Gel 15 Gram(s) Oral once PRN Blood Glucose LESS THAN 70 milliGRAM(s)/deciliter      ALLERGIES:  Allergies    No Known Allergies    Intolerances        LABS:                        11.5   10.50 )-----------( 456      ( 21 Feb 2023 05:30 )             35.8     02-21    136  |  101  |  21  ----------------------------<  111<H>  4.3   |  27  |  1.07    Ca    9.1      21 Feb 2023 05:30  Phos  3.8     02-21  Mg     1.9     02-21    TPro  6.8  /  Alb  3.1<L>  /  TBili  0.3  /  DBili  x   /  AST  17  /  ALT  18  /  AlkPhos  114  02-21    PT/INR - ( 20 Feb 2023 18:01 )   PT: 12.5 sec;   INR: 1.05          PTT - ( 20 Feb 2023 18:01 )  PTT:29.0 sec    CAPILLARY BLOOD GLUCOSE      POCT Blood Glucose.: 136 mg/dL (21 Feb 2023 09:25)      RADIOLOGY & ADDITIONAL TESTS: Reviewed.   Hospital Course  62F PMH CAD w/ stents, DM, HTN, PAD s/p 3 arterial stent, CVA (no residual), MI x 2, peripheral neuropathy, mild early dementia p/w right great toe wound x1 month. Xray concerning for osteomyelitis of right great toe distal phalanx and chronic fracture of right 3rd proximal phalanx. Started on broad spectrum Abx with Vanc Zosyn. Wound cx growing gram neg rods and gram positive cocci in pairs. Pending MRI to evaluate for extent of osteomyelitis to guide amputation. A1c 12.0, Endo consulted for insulin management. Okay to continue plavix per podiatry given recent stents.     OVERNIGHT EVENTS: RITA    SUBJECTIVE / INTERVAL HPI: Patient seen and examined at bedside. HPI obtained as mentioned in the chart. Per patient, has two cardiac stents and 3 leg stents. Most recent cardiac stent placed 3 months ago at [facility]. Most recent leg stent placed 2 weeks ago and on plavix. Feeling well, agreeable to possible amputation. Pain localized to right ankle. Denies fever, chills, SOB, N/V/D/C.    VITAL SIGNS:  Vital Signs Last 24 Hrs  T(C): 36.7 (21 Feb 2023 06:04), Max: 37.1 (20 Feb 2023 17:28)  T(F): 98 (21 Feb 2023 06:04), Max: 98.8 (20 Feb 2023 17:28)  HR: 72 (21 Feb 2023 06:04) (72 - 88)  BP: 135/86 (21 Feb 2023 06:04) (112/76 - 135/86)  BP(mean): --  RR: 17 (21 Feb 2023 06:04) (16 - 17)  SpO2: 94% (21 Feb 2023 06:04) (94% - 98%)    Parameters below as of 21 Feb 2023 06:04  Patient On (Oxygen Delivery Method): room air      I&O's Summary      PHYSICAL EXAM:    General: NAD, sitting up in bed  HEENT: head NC/AT, no conjunctival injection, EOMI, MMM  Neck: supple  Cardiac: RRR, +S1/S2, no murmurs  Respiratory: lungs CTAB in upper fields, diminished breath sounds in b/l bases suspect 2/2 body habitus  Abdomen: normal bowel sounds, soft, NT, ND  Extremities: WWP, no LE edema, R 1cm wound nonpurulent drainage, erythematous around base, nontender  Vascular: 1+ radial pulses b/l, 2+ L DP, 1+ R PT pulse  Neuro: A&Ox3, no focal deficits; 5/5 strength b/l; decreased sensation below calf b/l    MEDICATIONS:  MEDICATIONS  (STANDING):  aspirin enteric coated 81 milliGRAM(s) Oral every 24 hours  atorvastatin 40 milliGRAM(s) Oral at bedtime  clopidogrel Tablet 75 milliGRAM(s) Oral every 24 hours  dextrose 5%. 1000 milliLiter(s) (100 mL/Hr) IV Continuous <Continuous>  dextrose 5%. 1000 milliLiter(s) (50 mL/Hr) IV Continuous <Continuous>  dextrose 50% Injectable 25 Gram(s) IV Push once  dextrose 50% Injectable 12.5 Gram(s) IV Push once  dextrose 50% Injectable 25 Gram(s) IV Push once  enoxaparin Injectable 40 milliGRAM(s) SubCutaneous every 24 hours  gabapentin 300 milliGRAM(s) Oral every 24 hours  glucagon  Injectable 1 milliGRAM(s) IntraMuscular once  hydrochlorothiazide 25 milliGRAM(s) Oral every 24 hours  influenza   Vaccine 0.5 milliLiter(s) IntraMuscular once  insulin glargine Injectable (LANTUS) 15 Unit(s) SubCutaneous at bedtime  insulin lispro (ADMELOG) corrective regimen sliding scale   SubCutaneous Before meals and at bedtime  insulin lispro Injectable (ADMELOG) 4 Unit(s) SubCutaneous three times a day before meals  metoprolol tartrate 25 milliGRAM(s) Oral every 12 hours  nicotine - 21 mG/24Hr(s) Patch 1 Patch Transdermal daily  pancrelipase  (CREON 36,000 Lipase Units) 2 Capsule(s) Oral three times a day with meals  pantoprazole    Tablet 40 milliGRAM(s) Oral before breakfast  piperacillin/tazobactam IVPB.. 4.5 Gram(s) IV Intermittent every 8 hours  sertraline 150 milliGRAM(s) Oral every 24 hours  vancomycin  IVPB 1500 milliGRAM(s) IV Intermittent every 12 hours    MEDICATIONS  (PRN):  acetaminophen     Tablet .. 650 milliGRAM(s) Oral every 6 hours PRN Mild Pain (1 - 3)  clonazePAM  Tablet 0.5 milliGRAM(s) Oral every 24 hours PRN anxiety  dextrose Oral Gel 15 Gram(s) Oral once PRN Blood Glucose LESS THAN 70 milliGRAM(s)/deciliter      ALLERGIES:  Allergies    No Known Allergies    Intolerances        LABS:                        11.5   10.50 )-----------( 456      ( 21 Feb 2023 05:30 )             35.8     02-21    136  |  101  |  21  ----------------------------<  111<H>  4.3   |  27  |  1.07    Ca    9.1      21 Feb 2023 05:30  Phos  3.8     02-21  Mg     1.9     02-21    TPro  6.8  /  Alb  3.1<L>  /  TBili  0.3  /  DBili  x   /  AST  17  /  ALT  18  /  AlkPhos  114  02-21    PT/INR - ( 20 Feb 2023 18:01 )   PT: 12.5 sec;   INR: 1.05          PTT - ( 20 Feb 2023 18:01 )  PTT:29.0 sec    CAPILLARY BLOOD GLUCOSE      POCT Blood Glucose.: 136 mg/dL (21 Feb 2023 09:25)      RADIOLOGY & ADDITIONAL TESTS: Reviewed.

## 2023-02-21 NOTE — PROGRESS NOTE ADULT - PROBLEM SELECTOR PLAN 1
Presented w/ pus/foul-smelling drainage from the R great toenail. Not currently in pain. XR showing bone erosion and possible subcutaneous emphysema c/f osteomyelitis. ESR and CRP elevated w/ leukocytosis. VSS. S/p Vancomycin 1g and Zosyn 3.375g in the ED. Seen by podiatry in the ED, planning for likely amputation after MRI, which will be used to determine level of amputation.    - follow up XR foot official read  - follow up MRI w/ con  - Podiatry following, appreciate recs  - deep wound Cx gram neg rods + gram positive cocci in pairs  - follow up BCx  - continue broad spectrum abx (Vanc Zosyn) until cultures result  - f/u with Podiatry if can continue with plavix pending amputation given recent cardiac and vascular stents Presented w/ pus/foul-smelling drainage from the R great toenail. Not currently in pain. XR showing bone erosion and possible subcutaneous emphysema c/f osteomyelitis. ESR and CRP elevated w/ leukocytosis. VSS. S/p Vancomycin 1g and Zosyn 3.375g in the ED. Seen by podiatry in the ED, planning for likely amputation after MRI, which will be used to determine level of amputation.    - follow up XR foot official read  - follow up MRI w/ con  - Podiatry following, appreciate recs  - deep wound Cx gram neg rods + gram positive cocci in pairs  - follow up BCx  - continue broad spectrum abx (Vanc Zosyn) until cultures result Presented w/ pus/foul-smelling drainage from the R great toenail. Not currently in pain. XR showing bone erosion and possible subcutaneous emphysema c/f osteomyelitis. ESR and CRP elevated w/ leukocytosis. VSS. S/p Vancomycin 1g and Zosyn 3.375g in the ED. Seen by podiatry in the ED, planning for likely amputation after MRI, which will be used to determine level of amputation.    - follow up XR foot official read  - follow up MRI w/ con  - Podiatry following, appreciate recs  - deep wound Cx gram neg rods + gram positive cocci in pairs  - follow up BCx  - continue broad spectrum abx (Vanc Zosyn) until cultures result  - f/u Vanc trough 2/22 AM labs

## 2023-02-21 NOTE — PROGRESS NOTE ADULT - PROBLEM SELECTOR PLAN 4
#CAD  Known CAD and 2 prior MIs s/p ETELVINA x2. First stent 2013, most recent stent placed 3 months ago. Takes ASA 81mg qd, Plavix 75mg qd and Atorvastatin 40mg qhs at home.  - continue ASA, plavix, atorvastatin  - f/u with Podiatry if can continue plavix if going for amputation given recent stents #CAD  Known CAD and 2 prior MIs s/p ETELVINA x2. First stent 2013, most recent stent placed 3 months ago. Takes ASA 81mg qd, Plavix 75mg qd and Atorvastatin 40mg qhs at home.  - continue ASA, plavix, atorvastatin  - f/u with Podiatry if amputation can be done while on plavix given recent stent placement

## 2023-02-21 NOTE — PROGRESS NOTE ADULT - ATTENDING COMMENTS
62-year-old male with a PMHx of CAD and MI (s/p PCI x 2, most recently 2 months ago, on DAPT), DMII (A1c 12.0%), HTN, PAD (s/p bilateral stents), CVA and dementia who presented with concern for right great toe osteomyelitis.      #Concern for Right Great Toe Osteomyelitis    -follow up xray and MRI    -podiatry consulted, s/p bedside bedridement, surgical plan pending MRI    -BCx (2/20): NGTD   -Deep Wound Cx (2/20): GNR and GPC, follow up speciation and sensitivities    -continue with vancomycin and zosyn, de-escalate as able based on culture data   -possible ID consult pending surgical plan      #DMII (A1c 12.0%)   -home regimen: Metformin 500mg BID and Basaglar 15units daily   -continue with glargine 15 units daily, Lispro 4 units TID and ISS   -endocrine consult, follow up recommendations     #CAD (s/p PCI, last about 2 months ago at [facility])  #PAD (s/p recent intervention of LLE)  -continue with ASA 81mg daily, Clopidogrel 75mg daily and Atorvastatin 80mg daily     #HTN  -continue with HCTZ 25mg daily and Metoprolol Tartrate 25mg BID     #GERD   -continue with pantoprazole 40mg daily      #Dementia/PD   -hold home Neudexta as not on formulary     #Anxiety/Depression   -continue with sertraline 150mg daily and PRN clonazepam    DVT PPx: Lovenox     Dispo: pending surgical plan

## 2023-02-21 NOTE — PROGRESS NOTE ADULT - PROBLEM SELECTOR PLAN 3
Hx of PAD, s/p 3 stents, with upcoming stent placement March 3 on the R leg Hx of PAD, s/p 3 stents, with upcoming stent placement March 3 on the R leg. Most recent stent placed 2 weeks ago  - f/u with Podiatry if amputation can be done while on plavix given recent stent placement

## 2023-02-21 NOTE — CONSULT NOTE ADULT - ATTENDING COMMENTS
Pt seen at bedside  Admitted with OM  Start 15 units lantus at bedtime, lispro 5 untis tid with meals when pt resumes PO intake  will follow

## 2023-02-21 NOTE — PROGRESS NOTE ADULT - ASSESSMENT
62F PMH CAD w/ stents, DM, HTN, PAD w/ left LE arterial stent, CVA (no residual), MI x 2 (most recent 2013), peripheral neuropathy, mild early dementia p/w right great toe wound x1 month. Podiatry consulted for right great toe wound. VSS, WBC 13.75, CRP 28.2, , X ray concerning for osteomyelitis of right great toe distal phalanx and chronic fracture of right 3rd proximal phalanx. Most likely diagnosis of right great toe osteomyelitis with overlying soft tissue infection.     Plan:   -Wound flushed with saline dressed with betadine DSD  -Deep wound cx taken in ED preliminarily growing gram neg rods, gram pos cocci in pairs  -Continue vanc zosyn, can de-escalate abx pending deep wound cx results  -F/U R foot MRI w/contrast to determine extent of osteomyelitis   -Pt understands she will likely need and amputation of her right great toe however the level of amputation will be determined based on MRI result    -Will plan for amputation in OR after MRI results   -Heel WBAT to RLE

## 2023-02-21 NOTE — PROGRESS NOTE ADULT - SUBJECTIVE AND OBJECTIVE BOX
Patient is a 62y old  Female who presents with a chief complaint of R great toe OM (20 Feb 2023 21:45)      INTERVAL HPI/ OVERNIGHT EVENTS: Pt admitted to medicine overnight, concern for infection of right great toe. Pt feeling well this morning; understands that she is pending MRI to determine extent of bone infection; amenable to surgical intervention/amputation if necessary following MRI results.       LABS                        11.5   10.50 )-----------( 456      ( 21 Feb 2023 05:30 )             35.8     02-21    136  |  101  |  21  ----------------------------<  111<H>  4.3   |  27  |  1.07    Ca    9.1      21 Feb 2023 05:30  Phos  3.8     02-21  Mg     1.9     02-21    TPro  6.8  /  Alb  3.1<L>  /  TBili  0.3  /  DBili  x   /  AST  17  /  ALT  18  /  AlkPhos  114  02-21    PT/INR - ( 20 Feb 2023 18:01 )   PT: 12.5 sec;   INR: 1.05          PTT - ( 20 Feb 2023 18:01 )  PTT:29.0 sec  ESR: 101  CRP: --  02-20 @ 18:01    ICU Vital Signs Last 24 Hrs  T(C): 36.7 (21 Feb 2023 06:04), Max: 37.1 (20 Feb 2023 17:28)  T(F): 98 (21 Feb 2023 06:04), Max: 98.8 (20 Feb 2023 17:28)  HR: 72 (21 Feb 2023 06:04) (72 - 88)  BP: 135/86 (21 Feb 2023 06:04) (112/76 - 135/86)  BP(mean): --  ABP: --  ABP(mean): --  RR: 17 (21 Feb 2023 06:04) (16 - 17)  SpO2: 94% (21 Feb 2023 06:04) (94% - 98%)    O2 Parameters below as of 21 Feb 2023 06:04  Patient On (Oxygen Delivery Method): room air        RADIOLOGY  XR R foot performed 2/20/2023: pending final read  Resident Wet Read: significant cortical erosion at distal tuft of right great toe distal phalanx with subsequent pathologic fracture, notable soft tissue defect of right great toe, chronic fx of base of 3rd proximal phalanx      MRI R foot: ordered    MICROBIOLOGY  Culture - Other (collected 20 Feb 2023 19:16)  Source: Wound Right great toe wound  Gram Stain (20 Feb 2023 21:46):    Few Gram Negative Rods    Moderate Gram positive cocci in pairs    Few WBC's      PHYSICAL EXAM  Lower Extremity Focused  Vasc: right DP 1/4, PT biphasic on doppler, left DP/PT 2/4, right dorsal foot with mild non pitting edema, Right great toe with dusky changes to level of MPJ  Derm: right great toe: [id number] wound to distal aspect, predebridement: hyperkeratotic cap with underlying fibrotic plug: post debridement: fibrotic base, probes to bone, +malodor, + seropurulent drainage, macerated border, periwound erythema, no crepitus or fluctuance     2/21/2023: 90% fibrotic base to the [id number] wound, lateral plantar edge probes to bone, no longer malodorous, no maceration, minimal seropurulent drainage observed on dressing.     Neuro: Protective sensation absent   MSK: 5/5 muscle strength in all compartments no pain with ROM of R ankle or 1st MPJ, right medial lower leg tender to palpation

## 2023-02-21 NOTE — PROGRESS NOTE ADULT - ASSESSMENT
63yo F PMH CAD and MI s/p ETELVINA x2 (2012/2013), T2DM c/b peripheral neuropathy, HTN, PAD w/ b/l arterial stents, CVA (no residual deficits), mild early dementia presented w/ foul-smelling drainage from R great toe, imaging suspicious for OM, admitted for IV abx and likely amputation w/ podiatry. 61yo F PMH CAD and MI s/p ETELVINA x2 (2012/2013), T2DM c/b peripheral neuropathy, HTN, PAD w/ b/l arterial stents, CVA (no residual deficits), mild early dementia presented w/ foul-smelling drainage from R great toe, imaging suspicious for OM, admitted for IV abx and likely amputation w/ podiatry.

## 2023-02-22 ENCOUNTER — RESULT REVIEW (OUTPATIENT)
Age: 63
End: 2023-02-22

## 2023-02-22 ENCOUNTER — TRANSCRIPTION ENCOUNTER (OUTPATIENT)
Age: 63
End: 2023-02-22

## 2023-02-22 LAB
-  AMPICILLIN/SULBACTAM: SIGNIFICANT CHANGE UP
-  AMPICILLIN: SIGNIFICANT CHANGE UP
-  CEFTRIAXONE: SIGNIFICANT CHANGE UP
-  CIPROFLOXACIN: SIGNIFICANT CHANGE UP
-  CLINDAMYCIN: SIGNIFICANT CHANGE UP
-  DAPTOMYCIN: SIGNIFICANT CHANGE UP
-  ERYTHROMYCIN: SIGNIFICANT CHANGE UP
-  GENTAMICIN: SIGNIFICANT CHANGE UP
-  LEVOFLOXACIN: SIGNIFICANT CHANGE UP
-  LINEZOLID: SIGNIFICANT CHANGE UP
-  MEROPENEM: SIGNIFICANT CHANGE UP
-  MOXIFLOXACIN: SIGNIFICANT CHANGE UP
-  OXACILLIN: SIGNIFICANT CHANGE UP
-  RIFAMPIN: SIGNIFICANT CHANGE UP
-  TETRACYCLINE: SIGNIFICANT CHANGE UP
-  TRIMETHOPRIM/SULFAMETHOXAZOLE: SIGNIFICANT CHANGE UP
-  VANCOMYCIN: SIGNIFICANT CHANGE UP
ALBUMIN SERPL ELPH-MCNC: 3 G/DL — LOW (ref 3.3–5)
ALP SERPL-CCNC: 108 U/L — SIGNIFICANT CHANGE UP (ref 40–120)
ALT FLD-CCNC: 24 U/L — SIGNIFICANT CHANGE UP (ref 10–45)
ANION GAP SERPL CALC-SCNC: 9 MMOL/L — SIGNIFICANT CHANGE UP (ref 5–17)
APTT BLD: 30.3 SEC — SIGNIFICANT CHANGE UP (ref 27.5–35.5)
AST SERPL-CCNC: 30 U/L — SIGNIFICANT CHANGE UP (ref 10–40)
BASOPHILS # BLD AUTO: 0.08 K/UL — SIGNIFICANT CHANGE UP (ref 0–0.2)
BASOPHILS NFR BLD AUTO: 0.8 % — SIGNIFICANT CHANGE UP (ref 0–2)
BILIRUB SERPL-MCNC: 0.2 MG/DL — SIGNIFICANT CHANGE UP (ref 0.2–1.2)
BLD GP AB SCN SERPL QL: NEGATIVE — SIGNIFICANT CHANGE UP
BUN SERPL-MCNC: 23 MG/DL — SIGNIFICANT CHANGE UP (ref 7–23)
CALCIUM SERPL-MCNC: 9.3 MG/DL — SIGNIFICANT CHANGE UP (ref 8.4–10.5)
CHLORIDE SERPL-SCNC: 102 MMOL/L — SIGNIFICANT CHANGE UP (ref 96–108)
CO2 SERPL-SCNC: 27 MMOL/L — SIGNIFICANT CHANGE UP (ref 22–31)
CREAT SERPL-MCNC: 1.05 MG/DL — SIGNIFICANT CHANGE UP (ref 0.5–1.3)
CULTURE RESULTS: SIGNIFICANT CHANGE UP
EGFR: 60 ML/MIN/1.73M2 — SIGNIFICANT CHANGE UP
EOSINOPHIL # BLD AUTO: 0.38 K/UL — SIGNIFICANT CHANGE UP (ref 0–0.5)
EOSINOPHIL NFR BLD AUTO: 3.9 % — SIGNIFICANT CHANGE UP (ref 0–6)
GLUCOSE BLDC GLUCOMTR-MCNC: 148 MG/DL — HIGH (ref 70–99)
GLUCOSE BLDC GLUCOMTR-MCNC: 150 MG/DL — HIGH (ref 70–99)
GLUCOSE BLDC GLUCOMTR-MCNC: 158 MG/DL — HIGH (ref 70–99)
GLUCOSE BLDC GLUCOMTR-MCNC: 163 MG/DL — HIGH (ref 70–99)
GLUCOSE BLDC GLUCOMTR-MCNC: 175 MG/DL — HIGH (ref 70–99)
GLUCOSE BLDC GLUCOMTR-MCNC: 255 MG/DL — HIGH (ref 70–99)
GLUCOSE SERPL-MCNC: 147 MG/DL — HIGH (ref 70–99)
HCT VFR BLD CALC: 36.6 % — SIGNIFICANT CHANGE UP (ref 34.5–45)
HGB BLD-MCNC: 11.6 G/DL — SIGNIFICANT CHANGE UP (ref 11.5–15.5)
IMM GRANULOCYTES NFR BLD AUTO: 0.4 % — SIGNIFICANT CHANGE UP (ref 0–0.9)
INR BLD: 1.07 — SIGNIFICANT CHANGE UP (ref 0.88–1.16)
LYMPHOCYTES # BLD AUTO: 2.99 K/UL — SIGNIFICANT CHANGE UP (ref 1–3.3)
LYMPHOCYTES # BLD AUTO: 30.9 % — SIGNIFICANT CHANGE UP (ref 13–44)
MAGNESIUM SERPL-MCNC: 1.8 MG/DL — SIGNIFICANT CHANGE UP (ref 1.6–2.6)
MCHC RBC-ENTMCNC: 26.7 PG — LOW (ref 27–34)
MCHC RBC-ENTMCNC: 31.7 GM/DL — LOW (ref 32–36)
MCV RBC AUTO: 84.1 FL — SIGNIFICANT CHANGE UP (ref 80–100)
METHOD TYPE: SIGNIFICANT CHANGE UP
MONOCYTES # BLD AUTO: 0.62 K/UL — SIGNIFICANT CHANGE UP (ref 0–0.9)
MONOCYTES NFR BLD AUTO: 6.4 % — SIGNIFICANT CHANGE UP (ref 2–14)
NEUTROPHILS # BLD AUTO: 5.57 K/UL — SIGNIFICANT CHANGE UP (ref 1.8–7.4)
NEUTROPHILS NFR BLD AUTO: 57.6 % — SIGNIFICANT CHANGE UP (ref 43–77)
NRBC # BLD: 0 /100 WBCS — SIGNIFICANT CHANGE UP (ref 0–0)
ORGANISM # SPEC MICROSCOPIC CNT: SIGNIFICANT CHANGE UP
PHOSPHATE SERPL-MCNC: 3.7 MG/DL — SIGNIFICANT CHANGE UP (ref 2.5–4.5)
PLATELET # BLD AUTO: 447 K/UL — HIGH (ref 150–400)
POTASSIUM SERPL-MCNC: 4.2 MMOL/L — SIGNIFICANT CHANGE UP (ref 3.5–5.3)
POTASSIUM SERPL-SCNC: 4.2 MMOL/L — SIGNIFICANT CHANGE UP (ref 3.5–5.3)
PROT SERPL-MCNC: 7 G/DL — SIGNIFICANT CHANGE UP (ref 6–8.3)
PROTHROM AB SERPL-ACNC: 12.7 SEC — SIGNIFICANT CHANGE UP (ref 10.5–13.4)
RBC # BLD: 4.35 M/UL — SIGNIFICANT CHANGE UP (ref 3.8–5.2)
RBC # FLD: 16.4 % — HIGH (ref 10.3–14.5)
RH IG SCN BLD-IMP: POSITIVE — SIGNIFICANT CHANGE UP
SODIUM SERPL-SCNC: 138 MMOL/L — SIGNIFICANT CHANGE UP (ref 135–145)
SPECIMEN SOURCE: SIGNIFICANT CHANGE UP
VANCOMYCIN TROUGH SERPL-MCNC: 20.8 UG/ML — HIGH (ref 10–20)
WBC # BLD: 9.68 K/UL — SIGNIFICANT CHANGE UP (ref 3.8–10.5)
WBC # FLD AUTO: 9.68 K/UL — SIGNIFICANT CHANGE UP (ref 3.8–10.5)

## 2023-02-22 PROCEDURE — 88305 TISSUE EXAM BY PATHOLOGIST: CPT | Mod: 26

## 2023-02-22 PROCEDURE — 88307 TISSUE EXAM BY PATHOLOGIST: CPT | Mod: 26

## 2023-02-22 PROCEDURE — 88311 DECALCIFY TISSUE: CPT | Mod: 26

## 2023-02-22 PROCEDURE — 93010 ELECTROCARDIOGRAM REPORT: CPT

## 2023-02-22 PROCEDURE — 99233 SBSQ HOSP IP/OBS HIGH 50: CPT | Mod: GC

## 2023-02-22 RX ORDER — HYDROMORPHONE HYDROCHLORIDE 2 MG/ML
1 INJECTION INTRAMUSCULAR; INTRAVENOUS; SUBCUTANEOUS ONCE
Refills: 0 | Status: DISCONTINUED | OUTPATIENT
Start: 2023-02-22 | End: 2023-02-23

## 2023-02-22 RX ORDER — LANOLIN ALCOHOL/MO/W.PET/CERES
5 CREAM (GRAM) TOPICAL AT BEDTIME
Refills: 0 | Status: DISCONTINUED | OUTPATIENT
Start: 2023-02-22 | End: 2023-02-23

## 2023-02-22 RX ORDER — VANCOMYCIN HCL 1 G
1000 VIAL (EA) INTRAVENOUS EVERY 12 HOURS
Refills: 0 | Status: COMPLETED | OUTPATIENT
Start: 2023-02-22 | End: 2023-02-23

## 2023-02-22 RX ADMIN — PIPERACILLIN AND TAZOBACTAM 25 GRAM(S): 4; .5 INJECTION, POWDER, LYOPHILIZED, FOR SOLUTION INTRAVENOUS at 21:14

## 2023-02-22 RX ADMIN — ENOXAPARIN SODIUM 40 MILLIGRAM(S): 100 INJECTION SUBCUTANEOUS at 21:15

## 2023-02-22 RX ADMIN — SERTRALINE 150 MILLIGRAM(S): 25 TABLET, FILM COATED ORAL at 06:49

## 2023-02-22 RX ADMIN — Medication 250 MILLIGRAM(S): at 21:15

## 2023-02-22 RX ADMIN — Medication 250 MILLIGRAM(S): at 12:44

## 2023-02-22 RX ADMIN — Medication 6: at 23:39

## 2023-02-22 RX ADMIN — Medication 81 MILLIGRAM(S): at 06:49

## 2023-02-22 RX ADMIN — INSULIN GLARGINE 15 UNIT(S): 100 INJECTION, SOLUTION SUBCUTANEOUS at 23:43

## 2023-02-22 RX ADMIN — PANTOPRAZOLE SODIUM 40 MILLIGRAM(S): 20 TABLET, DELAYED RELEASE ORAL at 06:49

## 2023-02-22 RX ADMIN — CLOPIDOGREL BISULFATE 75 MILLIGRAM(S): 75 TABLET, FILM COATED ORAL at 06:48

## 2023-02-22 RX ADMIN — PIPERACILLIN AND TAZOBACTAM 25 GRAM(S): 4; .5 INJECTION, POWDER, LYOPHILIZED, FOR SOLUTION INTRAVENOUS at 12:43

## 2023-02-22 RX ADMIN — ATORVASTATIN CALCIUM 40 MILLIGRAM(S): 80 TABLET, FILM COATED ORAL at 21:15

## 2023-02-22 RX ADMIN — Medication 2: at 13:48

## 2023-02-22 RX ADMIN — GABAPENTIN 300 MILLIGRAM(S): 400 CAPSULE ORAL at 06:49

## 2023-02-22 RX ADMIN — Medication 5 MILLIGRAM(S): at 01:51

## 2023-02-22 RX ADMIN — PIPERACILLIN AND TAZOBACTAM 25 GRAM(S): 4; .5 INJECTION, POWDER, LYOPHILIZED, FOR SOLUTION INTRAVENOUS at 06:48

## 2023-02-22 RX ADMIN — ENOXAPARIN SODIUM 40 MILLIGRAM(S): 100 INJECTION SUBCUTANEOUS at 09:04

## 2023-02-22 NOTE — PROGRESS NOTE ADULT - ASSESSMENT
62F PMH CAD w/ stents, DM, HTN, PAD w/ left LE arterial stent, CVA (no residual), MI x 2 (most recent 2013), peripheral neuropathy, mild early dementia p/w right great toe wound x1 month. Podiatry consulted for right great toe wound. VSS, WBC 13.75, CRP 28.2, , X ray concerning for osteomyelitis of right great toe distal phalanx and chronic fracture of right 3rd proximal phalanx. Most likely diagnosis of right great toe osteomyelitis with overlying soft tissue infection. Deep wound cx growing MRSA. MR showed OM of right distal phalanx of hallux.    Plan:   - Plan for Right foot distal symes amputation, today 2/22  -F/U ID reccs re abx mgmt  - Please keep pt NPO until after procedure   -CBC, CMP, T&S, Coags, COVID test up to date, continue NPO  -Heel WBAT to RLE   - Rest of care per primary team    Plan discussed with Attending. Podiatry following.

## 2023-02-22 NOTE — PROGRESS NOTE ADULT - ASSESSMENT
62F PMH CAD w/ stents, DM, HTN, PAD w/ left LE arterial stent, CVA (no residual), MI x 2 (most recent 2013), peripheral neuropathy, mild early dementia p/w right great toe wound x1 month. Podiatry consulted for right great toe wound. VSS, WBC 13.75, CRP 28.2, , X ray concerning for osteomyelitis of right great toe distal phalanx and chronic fracture of right 3rd proximal phalanx. Most likely diagnosis of right great toe osteomyelitis with overlying soft tissue infection.     Plan:   -Wound flushed with saline dressed with betadine DSD  -Deep wound cx taken in ED preliminarily growing Staph aureus, presumptive MRSA  -F/U ID reccs re abx mgmt  -R foot MRI confirmed osteomyelitis of distal phalanx  -Pt added-on for 2/22 after 6PM for right foot distal symes amputation  -CBC, CMP, T&S, Coags, COVID test up to date, continue NPO  -Heel WBAT to E

## 2023-02-22 NOTE — PROGRESS NOTE ADULT - SUBJECTIVE AND OBJECTIVE BOX
PRE-OP NOTE    Pre-Op Diagnosis: Right hallux osteomyelitis  Planned Procedure: Distal symes amputation of Right foot hallux  Scheduled Date / Time: 2/22/2023  Indication: Right hallux osteomyelitis    Labs:                       11.6   9.68  )-----------( 447      ( 22 Feb 2023 05:30 )             36.6   02-22    138  |  102  |  23  ----------------------------<  147<H>  4.2   |  27  |  1.05    Ca    9.3      22 Feb 2023 05:30  Phos  3.7     02-22  Mg     1.8     02-22    TPro  7.0  /  Alb  3.0<L>  /  TBili  0.2  /  DBili  x   /  AST  30  /  ALT  24  /  AlkPhos  108  02-22  LIVER FUNCTIONS - ( 22 Feb 2023 05:30 )  Alb: 3.0 g/dL / Pro: 7.0 g/dL / ALK PHOS: 108 U/L / ALT: 24 U/L / AST: 30 U/L / GGT: x           Vitals: Vital Signs Last 24 Hrs  T(C): 36.8 (22 Feb 2023 13:49), Max: 37 (21 Feb 2023 20:30)  T(F): 98.2 (22 Feb 2023 13:49), Max: 98.6 (21 Feb 2023 20:30)  HR: 82 (22 Feb 2023 13:49) (71 - 105)  BP: 128/80 (22 Feb 2023 13:49) (124/78 - 137/84)  BP(mean): --  RR: 17 (22 Feb 2023 13:49) (17 - 18)  SpO2: 96% (22 Feb 2023 13:49) (93% - 96%)    Parameters below as of 22 Feb 2023 13:49  Patient On (Oxygen Delivery Method): room air      PE:   Official CXR reading: (On Chart)  Official EKG reading: (On Chart)  Type and Cross/Screen: (on chart)  NPO after MN  Antibiotics: vanc/zosyn   Operative Consent (on chart)

## 2023-02-22 NOTE — PRE-ANESTHESIA EVALUATION ADULT - NSANTHPMHFT_GEN_ALL_CORE
Cardiac: Positive for CAD/MI s/p ETELVINA x 2 (2012/2013), PAD s/p bilateral arterial stents, HTN, HLD.  Pulmonary: Denies Asthma, COPD, JOSETTE  Renal: Denies kidney dysfunction  Hepatic: Denies liver dysfunction  Gastrointestinal:Positive for GERD. Denies IBD  Endocrine: Uncontrolled DM type II (A1C 12.0%). Denies thyroid dysfunction  Neurologic: Positive for Parkinson's Disease Dementia, CVA with no residual deficit.  Hematologic: Positive for aspirin, plavix, lovenox use. Denies anemia, blood clotting disorder.    PSH: ETELVINA x 2 Cardiac: Positive for CAD/MI s/p ETELVINA x 2 (2012/2013,) and ETELVINA x 1 (2022), PAD s/p bilateral arterial stents, HTN, HLD.  Pulmonary: Positive for COPD. Denies Asthma, JOSETTE  Renal: Denies kidney dysfunction  Hepatic: Denies liver dysfunction  Gastrointestinal: Positive for GERD. Denies IBD  Endocrine: Uncontrolled DM type II (A1C 12.0%). Denies thyroid dysfunction  Neurologic: Positive for Parkinson's Disease Dementia, CVA x 7 with no residual deficit.  Hematologic: Positive for aspirin, plavix, lovenox use. Denies anemia, blood clotting disorder.    PSH: ETELVINA x 2 (2012/2013), ETELVINA x 1 (2022), Left Breast Surgery x 4, Cholecystectomy, Bladder Suspension, Hammer Toe Surgery

## 2023-02-22 NOTE — PROGRESS NOTE ADULT - ASSESSMENT
61yo F PMH CAD and MI s/p ETELVINA x2 (2012/2013), T2DM c/b peripheral neuropathy, HTN, PAD w/ b/l arterial stents, CVA (no residual deficits), mild early dementia presented w/ foul-smelling drainage from R great toe, imaging suspicious for OM, admitted for IV abx and amputation w/ podiatry.

## 2023-02-22 NOTE — PRE-ANESTHESIA EVALUATION ADULT - NSANTHAGERD_ENT_A_CORE
Please follow-up with espinoza Flannery, within 2-3 days. Use ibuprofen/tylenol as labeled and as needed for pain. Return to ED if you experience any uncontrollable pain, redness, swelling, or numbness/tingling to area.  
Yes

## 2023-02-22 NOTE — PROGRESS NOTE ADULT - PROBLEM SELECTOR PLAN 4
#CAD  Known CAD and 2 prior MIs s/p ETELVINA x2. First stent 2013, most recent stent placed 3 months ago. Takes ASA 81mg qd, Plavix 75mg qd and Atorvastatin 40mg qhs at home.  - continue ASA, plavix, atorvastatin  - c/w plavix

## 2023-02-22 NOTE — PROGRESS NOTE ADULT - SUBJECTIVE AND OBJECTIVE BOX
POST OP NOTE    HAROON MONET  MRN-[id number]    Procedure: Right foot distal symes amputation of hallux  Surgeon: Dr. Alvaro Robin  Assistants: Pat Uribe    Patient tolerated procedure well without incident.  Patient transferred to PACU in stable condition. Pt denies pain. Pt denies n/v/f/c/sob.       PE / Post Op Check:       GEN: NAD, AAOx3, resting comfortably with pain controlled      LE Focused:  No strikethrough is appreciated on surgical dressings.  Dressings were dry, clean, and intact.  No neuromuscular deficits appreciated.

## 2023-02-22 NOTE — BRIEF OPERATIVE NOTE - OPERATION/FINDINGS
Given 10 ccs of local anesthetic consisting of 1:1 mixture of 1% lidocaine plain and 0.5% marcaine in a hallux block fashion. MAde fishmouth incision around proximal phalanx of Right hallux down to level of bone. Disarticulated IPJ and removed distal portion of hallux, passed off the field, and sent as specimen for pathology. Dissected soft tissue from distal proximal phalanx. Using sable saw, removed prox phalanx head and sent as clean margin for pathology. Copiously irrigated with 1 L saline. Using sterile rongeur, took piece of bone from proximal phalanx and sent as clean margin for culture. Closed surgical site with 3-0 nylon. Dressed with xeroform, 4x8 gauze, kerlix, and ACE.

## 2023-02-22 NOTE — PROGRESS NOTE ADULT - ASSESSMENT
62F PMH CAD w/ stents, DM, HTN, PAD w/ left LE arterial stent, CVA (no residual), MI x 2 (most recent 2013), peripheral neuropathy, mild early dementia p/w right great toe wound x1 month. Podiatry consulted for right great toe wound. VSS, WBC 13.75, CRP 28.2, , X ray concerning for osteomyelitis of right great toe distal phalanx and chronic fracture of right 3rd proximal phalanx. Most likely diagnosis of right great toe osteomyelitis with overlying soft tissue infection. Deep wound cx growing MRSA. MR showed OM of right distal phalanx of hallux. Pt is now s/p Right hallux distal symes amputation on 2/22    Plan:   - F/u OR path/culture  - F/u post-op x-rays  - Pt is non-WB to RLE until tomorrow  - Pt may resume diet  - c/w IV antibiotics   - Rest of care per primary team    Plan discussed with Attending. Podiatry following.

## 2023-02-22 NOTE — PROGRESS NOTE ADULT - PROBLEM SELECTOR PLAN 3
Hx of PAD, s/p 3 stents, with upcoming stent placement March 3 on the R leg. Most recent stent placed 2 weeks ago  -c/w plavix

## 2023-02-22 NOTE — PROGRESS NOTE ADULT - ATTENDING COMMENTS
62-year-old male with a PMHx of CAD and MI (s/p PCI x 2, most recently 2 months ago, on DAPT), DMII (A1c 12.0%), HTN, PAD (s/p bilateral stents), CVA and dementia who presented with right great toe osteomyelitis.       #Right Great Toe Osteomyelitis     -MRI confirmed osteomyelitis of the distal first phalanx   -podiatry consulted, s/p bedside bedridement, plan for amputation today  -BCx (2/20): NGTD    -Deep Wound Cx (2/20): GNR and MRSA, follow up speciation and sensitivities     -continue with vancomycin and zosyn, de-escalate as able based on culture data    -possible ID consult pending surgical outcome (i.e. clean margins)    #DMII (A1c 12.0%)    -home regimen: Metformin 500mg BID and Basaglar 15units daily    -continue with glargine 15 units daily, Lispro 5 units TID and ISS    -endocrine consult,, appreciate recommendations    -nutrition consult     #CAD (s/p PCI, last about 2 months ago at [facility])   #PAD (s/p recent intervention of LLE)   -continue with ASA 81mg daily, Clopidogrel 75mg daily and Atorvastatin 80mg daily      #HTN   -continue with HCTZ 25mg daily and Metoprolol Tartrate 25mg BID      #GERD    -continue with pantoprazole 40mg daily       #Dementia/PD    -hold home Neudexta as not on formulary      #Anxiety/Depression    -continue with sertraline 150mg daily and PRN clonazepam    DVT PPx: Lovenox     Dispo: pending OR 62-year-old male with a PMHx of CAD and MI (s/p PCI x 2, most recently 2 months ago, on DAPT), DMII (A1c 12.0%), HTN, PAD (s/p bilateral stents), CVA and dementia who presented with right great toe osteomyelitis.       #Right Great Toe Osteomyelitis     -MRI confirmed osteomyelitis of the distal first phalanx   -podiatry consulted, s/p bedside bedridement, plan for amputation today  -BCx (2/20): NGTD    -Deep Wound Cx (2/20): GNR and MRSA, follow up speciation and sensitivities     -continue with vancomycin and zosyn, de-escalate as able based on culture data    -possible ID consult pending surgical outcome (i.e. clean margins)    #DMII (A1c 12.0%)    -home regimen: Metformin 500mg BID and Basaglar 15units daily    -continue with glargine 15 units daily, Lispro 5 units TID and ISS    -endocrine consult,, appreciate recommendations    -nutrition consult      #CAD (s/p PCI, last about 2 months ago at [facility])   #PAD (s/p recent intervention of LLE)   -continue with ASA 81mg daily, Clopidogrel 75mg daily and Atorvastatin 80mg daily      #HTN   -continue with HCTZ 25mg daily and Metoprolol Tartrate 25mg BID      #GERD    -continue with pantoprazole 40mg daily       #Dementia/PD    -hold home Neudexta as not on formulary      #Anxiety/Depression    -continue with sertraline 150mg daily and PRN clonazepam    DVT PPx: Lovenox     Dispo: pending OR

## 2023-02-22 NOTE — PROGRESS NOTE ADULT - PROBLEM SELECTOR PLAN 1
Presented w/ pus/foul-smelling drainage from the R great toenail. Not currently in pain. XR showing bone erosion and possible subcutaneous emphysema c/f osteomyelitis. ESR and CRP elevated w/ leukocytosis. VSS. S/p Vancomycin 1g and Zosyn 3.375g in the ED. Seen by podiatry in the ED, planning for likely amputation after MRI, which will be used to determine level of amputation.    - follow up XR foot official read  - MRI R foot       -Osteomyelitis of the distal first phalanx.      -Subacute appearing fracture of the proximal aspect of the proximal third phalanx  - Podiatry planning for amputation 2/22  - deep wound Cx gram neg rods + gram positive cocci in pairs  - BCx 2/20 NGTD  - continue broad spectrum abx (Vanc Zosyn)   - Vanc redosed 2/22; f/u 10pm trough 2/23

## 2023-02-22 NOTE — PROGRESS NOTE ADULT - SUBJECTIVE AND OBJECTIVE BOX
INTERVAL HPI/OVERNIGHT EVENTS:  O/N: patient afebrile with stable vitals overnight.   This morning: Patient was seen and examined at bedside. R foot pain improved. MRI w/ osteomyelitis of R 1st toe.      VITAL SIGNS:  T(F): 98.2 (02-22-23 @ 13:49)  HR: 82 (02-22-23 @ 13:49)  BP: 128/80 (02-22-23 @ 13:49)  RR: 17 (02-22-23 @ 13:49)  SpO2: 96% (02-22-23 @ 13:49)  Wt(kg): --    PHYSICAL EXAM:    Constitutional: NAD  HEENT: EOMI, sclera non-icteric, neck supple, trachea midline, no masses, no JVD  Respiratory: CTA b/l, good air entry b/l, no wheezing, no rhonchi, no rales, without accessory muscle use and no intercostal retractions  Cardiovascular: RRR, normal S1S2, no M/R/G  Gastrointestinal: abdomen soft, NTND, no masses palpable, BS normal  Extremities: Warm, well perfused, pulses equal bilateral upper and lower extremities, no edema. R foot w/ ACE wrap in place  Neurological: AAOx3, CN Grossly intact  Skin: Normal temperature, warm, dry    MEDICATIONS  (STANDING):  aspirin enteric coated 81 milliGRAM(s) Oral every 24 hours  atorvastatin 40 milliGRAM(s) Oral at bedtime  clopidogrel Tablet 75 milliGRAM(s) Oral every 24 hours  dextrose 5%. 1000 milliLiter(s) (100 mL/Hr) IV Continuous <Continuous>  dextrose 5%. 1000 milliLiter(s) (50 mL/Hr) IV Continuous <Continuous>  dextrose 50% Injectable 25 Gram(s) IV Push once  dextrose 50% Injectable 12.5 Gram(s) IV Push once  dextrose 50% Injectable 25 Gram(s) IV Push once  enoxaparin Injectable 40 milliGRAM(s) SubCutaneous every 12 hours  gabapentin 300 milliGRAM(s) Oral every 24 hours  glucagon  Injectable 1 milliGRAM(s) IntraMuscular once  hydrochlorothiazide 25 milliGRAM(s) Oral every 24 hours  influenza   Vaccine 0.5 milliLiter(s) IntraMuscular once  insulin glargine Injectable (LANTUS) 15 Unit(s) SubCutaneous at bedtime  insulin lispro (ADMELOG) corrective regimen sliding scale   SubCutaneous Before meals and at bedtime  insulin lispro Injectable (ADMELOG) 5 Unit(s) SubCutaneous three times a day before meals  metoprolol tartrate 25 milliGRAM(s) Oral every 12 hours  nicotine - 21 mG/24Hr(s) Patch 1 Patch Transdermal daily  pancrelipase  (CREON 36,000 Lipase Units) 2 Capsule(s) Oral three times a day with meals  pantoprazole    Tablet 40 milliGRAM(s) Oral before breakfast  piperacillin/tazobactam IVPB.. 4.5 Gram(s) IV Intermittent every 8 hours  sertraline 150 milliGRAM(s) Oral every 24 hours  vancomycin  IVPB 1000 milliGRAM(s) IV Intermittent every 12 hours    MEDICATIONS  (PRN):  acetaminophen     Tablet .. 650 milliGRAM(s) Oral every 6 hours PRN Mild Pain (1 - 3)  clonazePAM  Tablet 0.5 milliGRAM(s) Oral every 24 hours PRN anxiety  dextrose Oral Gel 15 Gram(s) Oral once PRN Blood Glucose LESS THAN 70 milliGRAM(s)/deciliter  melatonin 5 milliGRAM(s) Oral at bedtime PRN Sleep      Allergies    No Known Allergies    Intolerances        LABS:                        11.6   9.68  )-----------( 447      ( 22 Feb 2023 05:30 )             36.6     02-22    138  |  102  |  23  ----------------------------<  147<H>  4.2   |  27  |  1.05    Ca    9.3      22 Feb 2023 05:30  Phos  3.7     02-22  Mg     1.8     02-22    TPro  7.0  /  Alb  3.0<L>  /  TBili  0.2  /  DBili  x   /  AST  30  /  ALT  24  /  AlkPhos  108  02-22    PT/INR - ( 22 Feb 2023 05:30 )   PT: 12.7 sec;   INR: 1.07          PTT - ( 22 Feb 2023 05:30 )  PTT:30.3 sec            RADIOLOGY & ADDITIONAL TESTS:  Reviewed

## 2023-02-22 NOTE — PROGRESS NOTE ADULT - SUBJECTIVE AND OBJECTIVE BOX
Patient is a 62y old  Female who presents with a chief complaint of R great toe OM (21 Feb 2023 12:53)      INTERVAL HPI/ OVERNIGHT EVENTS: RITA O/N. Pt had right foot MRI performed yesterday 2/21 evening. Discussed with patient the value of undergoing surgical intervention, pt demonstrated understanding and is amenable to moving forward with surgery tonight 2/22 as an add-on with Dr. Robin.       LABS                        11.5   10.50 )-----------( 456      ( 21 Feb 2023 05:30 )             35.8     02-21    136  |  101  |  21  ----------------------------<  111<H>  4.3   |  27  |  1.07    Ca    9.1      21 Feb 2023 05:30  Phos  3.8     02-21  Mg     1.9     02-21    TPro  6.8  /  Alb  3.1<L>  /  TBili  0.3  /  DBili  x   /  AST  17  /  ALT  18  /  AlkPhos  114  02-21    PT/INR - ( 20 Feb 2023 18:01 )   PT: 12.5 sec;   INR: 1.05          PTT - ( 20 Feb 2023 18:01 )  PTT:29.0 sec    ICU Vital Signs Last 24 Hrs  T(C): 36.6 (22 Feb 2023 06:49), Max: 37 (21 Feb 2023 20:30)  T(F): 97.9 (22 Feb 2023 06:49), Max: 98.6 (21 Feb 2023 20:30)  HR: 71 (22 Feb 2023 06:49) (71 - 105)  BP: 136/82 (22 Feb 2023 06:49) (124/78 - 137/84)  BP(mean): --  ABP: --  ABP(mean): --  RR: 18 (22 Feb 2023 06:49) (18 - 18)  SpO2: 93% (22 Feb 2023 06:49) (92% - 95%)    O2 Parameters below as of 21 Feb 2023 20:30  Patient On (Oxygen Delivery Method): room air      RADIOLOGY  < from: MR Foot w/ IV Cont, Right (02.21.23 @ 19:37) >  IMPRESSION:  Osteomyelitis of the distal first phalanx.  Subacute appearing fracture of the proximal aspect of the proximal third   phalanx.    < end of copied text >  < from: Xray Toes, Right Foot (02.20.23 @ 18:16) >    Findings/  impression: First toe soft tissue swelling, first distal phalangeal   osteomyelitis, pathological fracture, soft tissue air. Third proximal   phalangeal healing fracture.    < end of copied text >      MICROBIOLOGY    Culture - Other (collected 20 Feb 2023 19:16)  Source: Wound Right great toe wound  Gram Stain (20 Feb 2023 21:46):    Few Gram Negative Rods    Moderate Gram positive cocci in pairs    Few WBC's  Preliminary Report (21 Feb 2023 12:24):    Moderate Staphylococcus aureus presumptive Methicillin resistant    Confirmation to follow within 24 hours    Result called to and read back byKerry Daigle RN  02/21/2023 12:24:02    Culture in progress        PHYSICAL EXAM  Lower Extremity Focused  Vasc: right DP 1/4, PT biphasic on doppler, left DP/PT 2/4, right dorsal foot with mild non pitting edema, Right great toe with dusky changes to level of MPJ  Derm: right great toe: [id number] wound to distal aspect, predebridement: hyperkeratotic cap with underlying fibrotic plug: post debridement: fibrotic base, probes to bone, +malodor, + seropurulent drainage, macerated border, periwound erythema, no crepitus or fluctuance     2/22/2023: 90% fibrotic base to the [id number] wound, lateral plantar edge probes to bone, no longer malodorous, no maceration, minimal seropurulent drainage observed on dressing.     Neuro: Protective sensation absent   MSK: 5/5 muscle strength in all compartments no pain with ROM of R ankle or 1st MPJ, right medial lower leg tender to palpation

## 2023-02-23 ENCOUNTER — TRANSCRIPTION ENCOUNTER (OUTPATIENT)
Age: 63
End: 2023-02-23

## 2023-02-23 VITALS
TEMPERATURE: 98 F | OXYGEN SATURATION: 97 % | HEART RATE: 70 BPM | DIASTOLIC BLOOD PRESSURE: 86 MMHG | SYSTOLIC BLOOD PRESSURE: 125 MMHG | RESPIRATION RATE: 18 BRPM

## 2023-02-23 LAB
ANION GAP SERPL CALC-SCNC: 10 MMOL/L — SIGNIFICANT CHANGE UP (ref 5–17)
BASOPHILS # BLD AUTO: 0.07 K/UL — SIGNIFICANT CHANGE UP (ref 0–0.2)
BASOPHILS NFR BLD AUTO: 0.7 % — SIGNIFICANT CHANGE UP (ref 0–2)
BUN SERPL-MCNC: 21 MG/DL — SIGNIFICANT CHANGE UP (ref 7–23)
CALCIUM SERPL-MCNC: 9.5 MG/DL — SIGNIFICANT CHANGE UP (ref 8.4–10.5)
CHLORIDE SERPL-SCNC: 98 MMOL/L — SIGNIFICANT CHANGE UP (ref 96–108)
CO2 SERPL-SCNC: 27 MMOL/L — SIGNIFICANT CHANGE UP (ref 22–31)
CREAT SERPL-MCNC: 1.01 MG/DL — SIGNIFICANT CHANGE UP (ref 0.5–1.3)
EGFR: 63 ML/MIN/1.73M2 — SIGNIFICANT CHANGE UP
EOSINOPHIL # BLD AUTO: 0.26 K/UL — SIGNIFICANT CHANGE UP (ref 0–0.5)
EOSINOPHIL NFR BLD AUTO: 2.4 % — SIGNIFICANT CHANGE UP (ref 0–6)
GLUCOSE BLDC GLUCOMTR-MCNC: 185 MG/DL — HIGH (ref 70–99)
GLUCOSE BLDC GLUCOMTR-MCNC: 186 MG/DL — HIGH (ref 70–99)
GLUCOSE SERPL-MCNC: 199 MG/DL — HIGH (ref 70–99)
HCT VFR BLD CALC: 35.2 % — SIGNIFICANT CHANGE UP (ref 34.5–45)
HGB BLD-MCNC: 11.5 G/DL — SIGNIFICANT CHANGE UP (ref 11.5–15.5)
IMM GRANULOCYTES NFR BLD AUTO: 0.5 % — SIGNIFICANT CHANGE UP (ref 0–0.9)
LYMPHOCYTES # BLD AUTO: 2.27 K/UL — SIGNIFICANT CHANGE UP (ref 1–3.3)
LYMPHOCYTES # BLD AUTO: 21.2 % — SIGNIFICANT CHANGE UP (ref 13–44)
MAGNESIUM SERPL-MCNC: 1.7 MG/DL — SIGNIFICANT CHANGE UP (ref 1.6–2.6)
MCHC RBC-ENTMCNC: 27.2 PG — SIGNIFICANT CHANGE UP (ref 27–34)
MCHC RBC-ENTMCNC: 32.7 GM/DL — SIGNIFICANT CHANGE UP (ref 32–36)
MCV RBC AUTO: 83.2 FL — SIGNIFICANT CHANGE UP (ref 80–100)
MONOCYTES # BLD AUTO: 0.53 K/UL — SIGNIFICANT CHANGE UP (ref 0–0.9)
MONOCYTES NFR BLD AUTO: 4.9 % — SIGNIFICANT CHANGE UP (ref 2–14)
NEUTROPHILS # BLD AUTO: 7.53 K/UL — HIGH (ref 1.8–7.4)
NEUTROPHILS NFR BLD AUTO: 70.3 % — SIGNIFICANT CHANGE UP (ref 43–77)
NRBC # BLD: 0 /100 WBCS — SIGNIFICANT CHANGE UP (ref 0–0)
PHOSPHATE SERPL-MCNC: 3.8 MG/DL — SIGNIFICANT CHANGE UP (ref 2.5–4.5)
PLATELET # BLD AUTO: 435 K/UL — HIGH (ref 150–400)
POTASSIUM SERPL-MCNC: 4.2 MMOL/L — SIGNIFICANT CHANGE UP (ref 3.5–5.3)
POTASSIUM SERPL-SCNC: 4.2 MMOL/L — SIGNIFICANT CHANGE UP (ref 3.5–5.3)
RBC # BLD: 4.23 M/UL — SIGNIFICANT CHANGE UP (ref 3.8–5.2)
RBC # FLD: 16.4 % — HIGH (ref 10.3–14.5)
SODIUM SERPL-SCNC: 135 MMOL/L — SIGNIFICANT CHANGE UP (ref 135–145)
VANCOMYCIN TROUGH SERPL-MCNC: 8.3 UG/ML — LOW (ref 10–20)
WBC # BLD: 10.71 K/UL — HIGH (ref 3.8–10.5)
WBC # FLD AUTO: 10.71 K/UL — HIGH (ref 3.8–10.5)

## 2023-02-23 PROCEDURE — 83735 ASSAY OF MAGNESIUM: CPT

## 2023-02-23 PROCEDURE — 82553 CREATINE MB FRACTION: CPT

## 2023-02-23 PROCEDURE — 85730 THROMBOPLASTIN TIME PARTIAL: CPT

## 2023-02-23 PROCEDURE — 87181 SC STD AGAR DILUTION PER AGT: CPT

## 2023-02-23 PROCEDURE — 85610 PROTHROMBIN TIME: CPT

## 2023-02-23 PROCEDURE — 86140 C-REACTIVE PROTEIN: CPT

## 2023-02-23 PROCEDURE — 83605 ASSAY OF LACTIC ACID: CPT

## 2023-02-23 PROCEDURE — 80048 BASIC METABOLIC PNL TOTAL CA: CPT

## 2023-02-23 PROCEDURE — 99239 HOSP IP/OBS DSCHRG MGMT >30: CPT | Mod: GC

## 2023-02-23 PROCEDURE — 86900 BLOOD TYPING SEROLOGIC ABO: CPT

## 2023-02-23 PROCEDURE — 80202 ASSAY OF VANCOMYCIN: CPT

## 2023-02-23 PROCEDURE — 82962 GLUCOSE BLOOD TEST: CPT

## 2023-02-23 PROCEDURE — 82550 ASSAY OF CK (CPK): CPT

## 2023-02-23 PROCEDURE — 87070 CULTURE OTHR SPECIMN AEROBIC: CPT

## 2023-02-23 PROCEDURE — 99285 EMERGENCY DEPT VISIT HI MDM: CPT | Mod: 25

## 2023-02-23 PROCEDURE — 84295 ASSAY OF SERUM SODIUM: CPT

## 2023-02-23 PROCEDURE — 73660 X-RAY EXAM OF TOE(S): CPT

## 2023-02-23 PROCEDURE — 83036 HEMOGLOBIN GLYCOSYLATED A1C: CPT

## 2023-02-23 PROCEDURE — 71045 X-RAY EXAM CHEST 1 VIEW: CPT

## 2023-02-23 PROCEDURE — 87635 SARS-COV-2 COVID-19 AMP PRB: CPT

## 2023-02-23 PROCEDURE — 84132 ASSAY OF SERUM POTASSIUM: CPT

## 2023-02-23 PROCEDURE — 85652 RBC SED RATE AUTOMATED: CPT

## 2023-02-23 PROCEDURE — 87186 SC STD MICRODIL/AGAR DIL: CPT

## 2023-02-23 PROCEDURE — 73630 X-RAY EXAM OF FOOT: CPT | Mod: 26,RT

## 2023-02-23 PROCEDURE — A9585: CPT

## 2023-02-23 PROCEDURE — 88305 TISSUE EXAM BY PATHOLOGIST: CPT

## 2023-02-23 PROCEDURE — 97161 PT EVAL LOW COMPLEX 20 MIN: CPT

## 2023-02-23 PROCEDURE — 84100 ASSAY OF PHOSPHORUS: CPT

## 2023-02-23 PROCEDURE — 86901 BLOOD TYPING SEROLOGIC RH(D): CPT

## 2023-02-23 PROCEDURE — 36415 COLL VENOUS BLD VENIPUNCTURE: CPT

## 2023-02-23 PROCEDURE — 99232 SBSQ HOSP IP/OBS MODERATE 35: CPT

## 2023-02-23 PROCEDURE — 82803 BLOOD GASES ANY COMBINATION: CPT

## 2023-02-23 PROCEDURE — 93005 ELECTROCARDIOGRAM TRACING: CPT

## 2023-02-23 PROCEDURE — 87075 CULTR BACTERIA EXCEPT BLOOD: CPT

## 2023-02-23 PROCEDURE — 73719 MRI LOWER EXTREMITY W/DYE: CPT

## 2023-02-23 PROCEDURE — 73630 X-RAY EXAM OF FOOT: CPT

## 2023-02-23 PROCEDURE — 88307 TISSUE EXAM BY PATHOLOGIST: CPT

## 2023-02-23 PROCEDURE — 80053 COMPREHEN METABOLIC PANEL: CPT

## 2023-02-23 PROCEDURE — 87040 BLOOD CULTURE FOR BACTERIA: CPT

## 2023-02-23 PROCEDURE — 82010 KETONE BODYS QUAN: CPT

## 2023-02-23 PROCEDURE — 82330 ASSAY OF CALCIUM: CPT

## 2023-02-23 PROCEDURE — 86803 HEPATITIS C AB TEST: CPT

## 2023-02-23 PROCEDURE — 86850 RBC ANTIBODY SCREEN: CPT

## 2023-02-23 PROCEDURE — 96365 THER/PROPH/DIAG IV INF INIT: CPT

## 2023-02-23 PROCEDURE — 85025 COMPLETE CBC W/AUTO DIFF WBC: CPT

## 2023-02-23 PROCEDURE — 88311 DECALCIFY TISSUE: CPT

## 2023-02-23 PROCEDURE — 96368 THER/DIAG CONCURRENT INF: CPT

## 2023-02-23 RX ORDER — SEMAGLUTIDE 0.68 MG/ML
0.25 INJECTION, SOLUTION SUBCUTANEOUS
Qty: 1 | Refills: 0
Start: 2023-02-23 | End: 2023-03-24

## 2023-02-23 RX ORDER — TIRZEPATIDE 15 MG/.5ML
2.5 INJECTION, SOLUTION SUBCUTANEOUS
Qty: 1 | Refills: 0
Start: 2023-02-23 | End: 2023-03-24

## 2023-02-23 RX ORDER — CLOPIDOGREL BISULFATE 75 MG/1
1 TABLET, FILM COATED ORAL
Qty: 0 | Refills: 0 | DISCHARGE
Start: 2023-02-23

## 2023-02-23 RX ADMIN — ENOXAPARIN SODIUM 40 MILLIGRAM(S): 100 INJECTION SUBCUTANEOUS at 09:23

## 2023-02-23 RX ADMIN — PANTOPRAZOLE SODIUM 40 MILLIGRAM(S): 20 TABLET, DELAYED RELEASE ORAL at 06:28

## 2023-02-23 RX ADMIN — PIPERACILLIN AND TAZOBACTAM 25 GRAM(S): 4; .5 INJECTION, POWDER, LYOPHILIZED, FOR SOLUTION INTRAVENOUS at 06:26

## 2023-02-23 RX ADMIN — Medication 25 MILLIGRAM(S): at 06:28

## 2023-02-23 RX ADMIN — Medication 2 CAPSULE(S): at 13:26

## 2023-02-23 RX ADMIN — Medication 2 CAPSULE(S): at 09:23

## 2023-02-23 RX ADMIN — Medication 2: at 13:28

## 2023-02-23 RX ADMIN — Medication 81 MILLIGRAM(S): at 06:29

## 2023-02-23 RX ADMIN — Medication 5 UNIT(S): at 09:07

## 2023-02-23 RX ADMIN — GABAPENTIN 300 MILLIGRAM(S): 400 CAPSULE ORAL at 06:28

## 2023-02-23 RX ADMIN — CLOPIDOGREL BISULFATE 75 MILLIGRAM(S): 75 TABLET, FILM COATED ORAL at 06:28

## 2023-02-23 RX ADMIN — Medication 250 MILLIGRAM(S): at 09:23

## 2023-02-23 RX ADMIN — SERTRALINE 150 MILLIGRAM(S): 25 TABLET, FILM COATED ORAL at 06:27

## 2023-02-23 RX ADMIN — Medication 2: at 09:08

## 2023-02-23 RX ADMIN — Medication 5 UNIT(S): at 13:28

## 2023-02-23 NOTE — DISCHARGE NOTE NURSING/CASE MANAGEMENT/SOCIAL WORK - NSDCPEFALRISK_GEN_ALL_CORE
For information on Fall & Injury Prevention, visit: [email or web address] OR  [email or web address] OR  https://www.cdc.gov/steadi/patient.html

## 2023-02-23 NOTE — DISCHARGE NOTE PROVIDER - PROVIDER TOKENS
PROVIDER:[TOKEN:[[id number]],FOLLOWUP:[1 week]],PROVIDER:[TOKEN:[[id number]],FOLLOWUP:[1 month]]

## 2023-02-23 NOTE — PROGRESS NOTE ADULT - ASSESSMENT
62F PMH CAD w/ stents, DM, HTN, PAD w/ left LE arterial stent, CVA (no residual), MI x 2 (most recent 2013), peripheral neuropathy, mild early dementia p/w right great toe wound x1 month. Podiatry consulted for right great toe wound. VSS, WBC 13.75, CRP 28.2, , X ray concerning for osteomyelitis of right great toe distal phalanx and chronic fracture of right 3rd proximal phalanx. Most likely diagnosis of right great toe osteomyelitis with overlying soft tissue infection. Deep wound cx growing MRSA. MR showed OM of right distal phalanx of hallux. Pt is now s/p Right hallux distal symes amputation on 2/22    Plan:   - c/w IV antibiotics per primary  - F/u OR path/culture (path pending, Cx NGTD)  - F/u post-op x-rays: wet read shows clean amputation site at level of proximal hallucal phalanx. Negative for soft tissue gas or acute signs of residual OM.  - Pt can heel WB to RLE in surgical shoe  - Rest of care per primary team    Pending d/w Attending regarding final reccs/ dispo planning. Podiatry following.  62F PMH CAD w/ stents, DM, HTN, PAD w/ left LE arterial stent, CVA (no residual), MI x 2 (most recent 2013), peripheral neuropathy, mild early dementia p/w right great toe wound x1 month. Podiatry consulted for right great toe wound. VSS, WBC 13.75, CRP 28.2, , X ray concerning for osteomyelitis of right great toe distal phalanx and chronic fracture of right 3rd proximal phalanx. Most likely diagnosis of right great toe osteomyelitis with overlying soft tissue infection. Deep wound cx growing MRSA. MR showed OM of right distal phalanx of hallux. Pt is now s/p Right hallux distal symes amputation on 2/22    Plan:   - c/w IV antibiotics per primary  - F/u OR path/culture (path pending, Cx NGTD) - can f/u outpt  - F/u post-op x-rays: wet read shows clean amputation site at level of proximal hallucal phalanx. Negative for soft tissue gas or acute signs of residual OM.  - Pt can heel WB to RLE in surgical shoe  - Recc 10 days Bactrim for STI  - Rest of care per primary team    Podiatry following. Plan d/w attending    Discharge Reccs:  - Dressing to stay intact until 1st post op visit; should not get dressings wet    Patient should follow up with Dr. Alvaro Robin within 1 week upon discharge:    Office information:          [place] Address- [address] [address] Phone: [phone]         [place] Address- [address] [address] Phone: [phone]

## 2023-02-23 NOTE — PROGRESS NOTE ADULT - PROVIDER SPECIALTY LIST ADULT
Internal Medicine
Podiatry
Endocrinology
Internal Medicine
Podiatry
Internal Medicine

## 2023-02-23 NOTE — DISCHARGE NOTE NURSING/CASE MANAGEMENT/SOCIAL WORK - NSDCFUADDAPPT_GEN_ALL_CORE_FT
(1) Please call your podiatrist Dr. Alvaro Robin [phone] to schedule and appointment within one week of your discharge.   (2) Please follow up with your endocrinologist Dr. Stanford to help manage your diabetes care. You can make an appointment by calling [phone]. Please schedule an appointment one month from your discharge. Please take your new diabetes medication ozempic at 0.25mg each week for four weeks. At that time, please follow up with Dr. Stanford to help make adjustments to your medication regimen.

## 2023-02-23 NOTE — DISCHARGE NOTE PROVIDER - NSDCFUADDAPPT_GEN_ALL_CORE_FT
(1) Please call your podiatrist Dr. Alvaro Robin [phone] to schedule and appointment within one week of your discharge.   (2) Please follow up with your endocrinologist Dr. Stanford to help manage your diabetes care. You can make an appointment by calling [phone]. Please schedule an appointment one month from your discharge. Please take your new diabetes medication ozempic at 0.25mg each week for four weeks. At that time, please follow up with Dr. Stanford to help make adjustments to your medication regimen.

## 2023-02-23 NOTE — DISCHARGE NOTE PROVIDER - NSDCCPCAREPLAN_GEN_ALL_CORE_FT
PRINCIPAL DISCHARGE DIAGNOSIS  Diagnosis: Osteomyelitis  Assessment and Plan of Treatment: Osteomyelitis is an infection in a bone. Infections can reach a bone by traveling through the bloodstream or spreading from nearby tissue. Infections can also begin in the bone itself if an injury exposes the bone to germs. Your infection did not get into your blood stream. You underwent an amputation of your infected toe, which was done to prevent the infection from spreading to nearby tissue or into the blood stream. Please continue to take your oral antibiotic bactrim as prescribed from 2/24 through 3/5 and follow up with your podiatrist to help further manage your care.      SECONDARY DISCHARGE DIAGNOSES  Diagnosis: Osteomyelitis  Assessment and Plan of Treatment:

## 2023-02-23 NOTE — PROGRESS NOTE ADULT - PROBLEM SELECTOR PLAN 1
- Please continue lantus 15 units at bedtime.   - Increase lispro to 5 units before each meal.  - Continue lispro moderate dose sliding scale before meals and at bedtime.  - Patient's fingerstick glucose goal is 100-180 mg/dL.      For discharge: No insulin. Metformin 1000mg BID and Ozempic 0.25mg weekly. AGUSTO Almanza-Jorge Luis educated on GLP-1 administration. Office will contact her to schedule f/u appt at [address]

## 2023-02-23 NOTE — PHYSICAL THERAPY INITIAL EVALUATION ADULT - BRACE/ORTHOTICS
87 y f came to the ed for transfusion. patient had routine blood work done out patient and was found to be anemic. hx of anemia and gi bleed. denies any chest pain, sob. abdomen is soft and nontender. skin is warm and dry. family at bedside. pmd called by ed md.     RN Rozina will resume care of the patient at 1410 upon the completion of her covered break. RLE post op shoe

## 2023-02-23 NOTE — PROGRESS NOTE ADULT - PROBLEM SELECTOR PROBLEM 4
History of multiple myocardial infarctions

## 2023-02-23 NOTE — DISCHARGE NOTE PROVIDER - CARE PROVIDER_API CALL
Alvaro Robin (DPM)  Orthopaedic Surgery  [address], [address]  [place] [address]  Phone: [phone]  Fax: [phone]  Follow Up Time: 1 week    Margy Stanford  INTERNAL MEDICINE  [address]  [place] [address]  Phone: [phone]  Fax: [phone]  Follow Up Time: 1 month

## 2023-02-23 NOTE — PROGRESS NOTE ADULT - PROBLEM SELECTOR PLAN 4
Known CAD and 2 prior MIs s/p ETELVINA x2. First stent 2013, most recent stent placed 3 months ago. Takes ASA 81mg qd, Plavix 75mg qd and Atorvastatin 40mg qhs at home.  - continue ASA, plavix, atorvastatin  - c/w plavix

## 2023-02-23 NOTE — PROGRESS NOTE ADULT - PROBLEM SELECTOR PLAN 2
Known T2DM, takes Metformin 500mg BID and Insulin Basaglar 15u qd at home. States her most recent a1c has been ~12%. Also takes Gabapentin 300mg qd for neuropathy and Creon (Pancrelipase) 2 caps TID w/ meals at home. A1C 12.     - lispro 4u premeal - adding given report of elevated A1c and elevated finger sticks so far this hospitalization; DM is likely uncontrolled  - lantus 15u qhs  - mISS  - continue gabapentin and pancrelipase  - Endocrine consulted, appreciate recs
Known T2DM, takes Metformin 500mg BID and Insulin Basaglar 15u qd at home. States her most recent a1c has been ~12%. Also takes Gabapentin 300mg qd for neuropathy and Creon (Pancrelipase) 2 caps TID w/ meals at home. A1C 12.     - Increase lispro to 5u premeal   - lantus 15u qhs  - mISS  - continue gabapentin and pancrelipase  - Endocrine consulted, appreciate recs
Known T2DM, takes Metformin 500mg BID and Insulin Basaglar 15u qd at home. States her most recent a1c has been ~12%. Also takes Gabapentin 300mg qd for neuropathy and Creon (Pancrelipase) 2 caps TID w/ meals at home. A1C 12.     - Increase lispro to 5u premeal   - lantus 15u qhs  - mISS  - continue gabapentin and pancrelipase  - Endocrine consulted, appreciate recs

## 2023-02-23 NOTE — PROGRESS NOTE ADULT - SUBJECTIVE AND OBJECTIVE BOX
INTERVAL HPI/OVERNIGHT EVENTS:  O/N: Patient went for R 1st toe amputation.  This morning: Patient was seen and examined at bedside. Tolerated amputation well. No acute complaints.     VITAL SIGNS:  T(F): 98.4 (02-23-23 @ 13:41)  HR: 70 (02-23-23 @ 13:41)  BP: 125/86 (02-23-23 @ 13:41)  RR: 18 (02-23-23 @ 13:41)  SpO2: 97% (02-23-23 @ 13:41)  Wt(kg): --    PHYSICAL EXAM:    Constitutional: NAD  HEENT: EOMI, sclera non-icteric, neck supple, trachea midline, no masses, no JVD  Respiratory: CTA b/l, good air entry b/l, no wheezing, no rhonchi, no rales, without accessory muscle use and no intercostal retractions  Cardiovascular: RRR, normal S1S2, no M/R/G  Gastrointestinal: abdomen soft, NTND, no masses palpable, BS normal  Extremities: Warm, well perfused, pulses equal bilateral upper and lower extremities, no edema. R foot w/ ACE wrap in place  Neurological: AAOx3, CN Grossly intact  Skin: Normal temperature, warm, dry    MEDICATIONS  (STANDING):  aspirin enteric coated 81 milliGRAM(s) Oral every 24 hours  atorvastatin 40 milliGRAM(s) Oral at bedtime  clopidogrel Tablet 75 milliGRAM(s) Oral every 24 hours  dextrose 5%. 1000 milliLiter(s) (50 mL/Hr) IV Continuous <Continuous>  dextrose 5%. 1000 milliLiter(s) (100 mL/Hr) IV Continuous <Continuous>  dextrose 50% Injectable 25 Gram(s) IV Push once  dextrose 50% Injectable 12.5 Gram(s) IV Push once  dextrose 50% Injectable 25 Gram(s) IV Push once  enoxaparin Injectable 40 milliGRAM(s) SubCutaneous every 12 hours  gabapentin 300 milliGRAM(s) Oral every 24 hours  glucagon  Injectable 1 milliGRAM(s) IntraMuscular once  hydrochlorothiazide 25 milliGRAM(s) Oral every 24 hours  influenza   Vaccine 0.5 milliLiter(s) IntraMuscular once  insulin glargine Injectable (LANTUS) 15 Unit(s) SubCutaneous at bedtime  insulin lispro (ADMELOG) corrective regimen sliding scale   SubCutaneous Before meals and at bedtime  insulin lispro Injectable (ADMELOG) 5 Unit(s) SubCutaneous three times a day before meals  metoprolol tartrate 25 milliGRAM(s) Oral every 12 hours  nicotine - 21 mG/24Hr(s) Patch 1 Patch Transdermal daily  pancrelipase  (CREON 36,000 Lipase Units) 2 Capsule(s) Oral three times a day with meals  pantoprazole    Tablet 40 milliGRAM(s) Oral before breakfast  sertraline 150 milliGRAM(s) Oral every 24 hours    MEDICATIONS  (PRN):  acetaminophen     Tablet .. 650 milliGRAM(s) Oral every 6 hours PRN Mild Pain (1 - 3)  clonazePAM  Tablet 0.5 milliGRAM(s) Oral every 24 hours PRN anxiety  dextrose Oral Gel 15 Gram(s) Oral once PRN Blood Glucose LESS THAN 70 milliGRAM(s)/deciliter  HYDROmorphone  Injectable 1 milliGRAM(s) IV Push once PRN Moderate Pain (4 - 6)  melatonin 5 milliGRAM(s) Oral at bedtime PRN Sleep      Allergies    No Known Allergies    Intolerances        LABS:                        11.5   10.71 )-----------( 435      ( 23 Feb 2023 08:41 )             35.2     02-23    135  |  98  |  21  ----------------------------<  199<H>  4.2   |  27  |  1.01    Ca    9.5      23 Feb 2023 08:41  Phos  3.8     02-23  Mg     1.7     02-23    TPro  7.0  /  Alb  3.0<L>  /  TBili  0.2  /  DBili  x   /  AST  30  /  ALT  24  /  AlkPhos  108  02-22    PT/INR - ( 22 Feb 2023 05:30 )   PT: 12.7 sec;   INR: 1.07          PTT - ( 22 Feb 2023 05:30 )  PTT:30.3 sec          Culture - Tissue with Gram Stain (collected 02-22-23 @ 19:35)  Source: .Tissue right first toe clean margin or spec  Gram Stain (02-22-23 @ 21:15):    No organisms seen    No WBC's seen.  Preliminary Report (02-23-23 @ 09:01):    No growth to date.        RADIOLOGY & ADDITIONAL TESTS:  Reviewed

## 2023-02-23 NOTE — PROGRESS NOTE ADULT - PROBLEM SELECTOR PLAN 1
Presented w/ pus/foul-smelling drainage from the R great toenail. Not currently in pain. XR showing bone erosion and possible subcutaneous emphysema c/f osteomyelitis. ESR and CRP elevated w/ leukocytosis. VSS. S/p Vancomycin 1g and Zosyn 3.375g in the ED. Seen by podiatry in the ED, planning for likely amputation after MRI, which will be used to determine level of amputation.    - follow up XR foot official read  - MRI R foot       -Osteomyelitis of the distal first phalanx.      -Subacute appearing fracture of the proximal aspect of the proximal third phalanx  - s/p amputation 2/22 w/ podiatry  - deep wound Cx gram neg rods + gram positive cocci in pairs  - BCx 2/20 NGTD  - Transition to oral abx on discharge

## 2023-02-23 NOTE — PROGRESS NOTE ADULT - ASSESSMENT
Ms. Dee is a 62-year-old female with a past medical history of type 2 diabetes mellitus, coronary artery disease (s/p ETELVINA x2 on 2012/2013) and mild dementia who presented with pus/foul-smelling drainage from her right great toe. She was admitted under impression of right great toe osteomyelitis s/p right great toe amputation on 2/22. Endocrinology consulted for recommendations regarding diabetes management.     A1C: 12.0 %  BUN: 21  Creatinine: 1.07  GFR: 59  Weight: 95.3  BMI: 36

## 2023-02-23 NOTE — PHYSICAL THERAPY INITIAL EVALUATION ADULT - FUNCTIONAL LIMITATIONS, PT EVAL
Left message for patient to call back.   Need clarification on the type of specialty is needing the referral. self-care/home management/community/leisure

## 2023-02-23 NOTE — PHYSICAL THERAPY INITIAL EVALUATION ADULT - PATIENT PROFILE REVIEW, REHAB EVAL
Pt would like some PO benadryl for itching from diluadid. Thanks Kimberly [phone]    Adam for kiran duvall    yes

## 2023-02-23 NOTE — PHYSICAL THERAPY INITIAL EVALUATION ADULT - ADDITIONAL COMMENTS
Pt lives with family in an apt with elevator, denies stairs. Prior to admission, pt ambulated independently without AD.

## 2023-02-23 NOTE — DISCHARGE NOTE NURSING/CASE MANAGEMENT/SOCIAL WORK - PATIENT PORTAL LINK FT
You can access the FollowMyHealth Patient Portal offered by [facility] by registering at the following website: [email or web address]. By joining [facility]’s FollowMyHealth portal, you will also be able to view your health information using other applications (apps) compatible with our system.

## 2023-02-23 NOTE — DISCHARGE NOTE PROVIDER - NSDCMRMEDTOKEN_GEN_ALL_CORE_FT
aspirin 81 mg oral tablet: 1 tab(s) orally once a day  atorvastatin 40 mg oral tablet: 1 tab(s) orally once a day  Bactrim 400 mg-80 mg oral tablet: 1 tab(s) orally 2 times a day beginning 2/24 through 3/5  clopidogrel 75 mg oral tablet: 1 tab(s) orally every 24 hours  Creon 36,000 units oral delayed release capsule: 2 cap(s) orally 3 times a day  gabapentin 300 mg oral tablet: 1 tab(s) orally once a day  hydroCHLOROthiazide 25 mg oral tablet: 1 tab(s) orally once a day  metFORMIN 500 mg oral tablet: 1 tab(s) orally 2 times a day  metoprolol tartrate 25 mg oral tablet: 1 tab(s) orally 2 times a day  Mounjaro 2.5 mg/0.5 mL subcutaneous solution: 2.5 milligram(s) subcutaneously once a week   Nuedexta 20 mg-10 mg oral capsule: 1 cap(s) orally every 12 hours  omeprazole 20 mg oral delayed release capsule: 1 cap(s) orally once a day  sertraline 150 mg oral capsule: 1 cap(s) orally once a day   aspirin 81 mg oral tablet: 1 tab(s) orally once a day  atorvastatin 40 mg oral tablet: 1 tab(s) orally once a day  Bactrim  mg-160 mg oral tablet: 1 tab(s) orally 2 times a day   clopidogrel 75 mg oral tablet: 1 tab(s) orally every 24 hours  Creon 36,000 units oral delayed release capsule: 2 cap(s) orally 3 times a day  gabapentin 300 mg oral tablet: 1 tab(s) orally once a day  hydroCHLOROthiazide 25 mg oral tablet: 1 tab(s) orally once a day  metFORMIN 500 mg oral tablet: 1 tab(s) orally 2 times a day  metoprolol tartrate 25 mg oral tablet: 1 tab(s) orally 2 times a day  Nuedexta 20 mg-10 mg oral capsule: 1 cap(s) orally every 12 hours  omeprazole 20 mg oral delayed release capsule: 1 cap(s) orally once a day  Ozempic 2 mg/1.5 mL (0.25 mg or 0.5 mg dose) subcutaneous solution: 0.25 milligram(s) subcutaneously once a week   sertraline 150 mg oral capsule: 1 cap(s) orally once a day

## 2023-02-23 NOTE — PHYSICAL THERAPY INITIAL EVALUATION ADULT - GENERAL OBSERVATIONS, REHAB EVAL
Pt received semi supine in bed with +IV, +ace wrap R foot with dry blood on the dressing, NAD. Pt left as found, NAD, line intact, call bell in reach, +bed alarm.

## 2023-02-23 NOTE — PROGRESS NOTE ADULT - SUBJECTIVE AND OBJECTIVE BOX
OVERNIGHT and SUBJECTIVE: Patient was seen and examined this morning. She had right toe amputation last night. She denies pain. She has good appetite. She had late dinner without premeal insulin    CAPILLARY BLOOD GLUCOSE & INSULIN RECEIVED  Yesterday  - Dinner FS mg/dL = 0 units of premeal Lispro  - Bedtime FS mg/dL = 15 units of Lantus + 6 units Lispro sliding scale.     Today  - Breakfast FS mg/dL = 5 units of premeal Lispro + 2 units of Lispro sliding scale.   - Lunch FS mg/dL = 5 units of premeal Lispro + 2 units of Lispro sliding scale.     185 mg/dL ( @ 13:03)  186 mg/dL ( @ 08:51)  255 mg/dL ( @ 23:37)  163 mg/dL ( @ 21:28)    REVIEW OF SYSTEMS  Constitutional:  Negative fever, chills or loss of appetite.  Eyes:  Negative blurry vision or double vision.  Cardiovascular:  Negative for chest pain or palpitations.  Respiratory:  Negative for cough, wheezing, or shortness of breath.    Gastrointestinal:  Negative for nausea, vomiting, diarrhea, constipation, or abdominal pain.  Genitourinary:  Negative frequency, urgency or dysuria.  Neurologic:  No headache, confusion, dizziness, lightheadedness.    PHYSICAL EXAM  Vital Signs Last 24 Hrs  T(C): 36.9 (2023 13:41), Max: 37 (2023 06:52)  T(F): 98.4 (2023 13:41), Max: 98.6 (2023 06:52)  HR: 70 (2023 13:41) (65 - 86)  BP: 125/86 (2023 13:41) (125/86 - 137/74)  BP(mean): 94 (2023 21:49) (94 - 100)  RR: 18 (2023 13:41) (17 - 152)  SpO2: 97% (2023 13:41) (91% - 100%)    Parameters below as of 2023 13:41  Patient On (Oxygen Delivery Method): room air    Constitutional: Awake, alert, in no acute distress.   HEENT: Normocephalic, atraumatic, PATRICK, no proptosis or lid retraction.   Neck: supple, no acanthosis, no thyromegaly or palpable thyroid nodules.  Respiratory: Lungs clear to ausculation bilaterally.   Cardiovascular: regular rhythm, normal S1 and S2, no audible murmurs.   GI: soft, non-tender, non-distended, bowel sounds present, no masses appreciated.  Extremities: No lower extremity edema, peripheral pulses present. Right toes wrapped in kerlex with scant sanguinous fluid.  Skin: no rashes.   Psychiatric: AAO x 3. Normal affect/mood.     LABS  CBC - WBC/HGB/HTC/PLT: 10.71/11.5/35.2/435 (23)  BMP - Na/K/Cl/Bicarb/BUN/Cr/Gluc: 135/4.2/98/27/21/1.01/199 (23)  Anion Gap: 10 (23)  eGFR: 63 (23)  Calcium: 9.5 (23)  Phosphorus: 3.8 (23)  Magnesium: 1.7 (23)  LFT - Alb/Tprot/Tbili/Dbili/AlkPhos/ALT/AST: 3.0/--/0.2/--/108/24/30 (23)  PT/aPTT/INR: 12.7/30.3/1.07 (23)         MEDICATIONS  MEDICATIONS  (STANDING):  aspirin enteric coated 81 milliGRAM(s) Oral every 24 hours  atorvastatin 40 milliGRAM(s) Oral at bedtime  clopidogrel Tablet 75 milliGRAM(s) Oral every 24 hours  dextrose 5%. 1000 milliLiter(s) (50 mL/Hr) IV Continuous <Continuous>  dextrose 5%. 1000 milliLiter(s) (100 mL/Hr) IV Continuous <Continuous>  dextrose 50% Injectable 25 Gram(s) IV Push once  dextrose 50% Injectable 12.5 Gram(s) IV Push once  dextrose 50% Injectable 25 Gram(s) IV Push once  enoxaparin Injectable 40 milliGRAM(s) SubCutaneous every 12 hours  gabapentin 300 milliGRAM(s) Oral every 24 hours  glucagon  Injectable 1 milliGRAM(s) IntraMuscular once  hydrochlorothiazide 25 milliGRAM(s) Oral every 24 hours  influenza   Vaccine 0.5 milliLiter(s) IntraMuscular once  insulin glargine Injectable (LANTUS) 15 Unit(s) SubCutaneous at bedtime  insulin lispro (ADMELOG) corrective regimen sliding scale   SubCutaneous Before meals and at bedtime  insulin lispro Injectable (ADMELOG) 5 Unit(s) SubCutaneous three times a day before meals  metoprolol tartrate 25 milliGRAM(s) Oral every 12 hours  nicotine - 21 mG/24Hr(s) Patch 1 Patch Transdermal daily  pancrelipase  (CREON 36,000 Lipase Units) 2 Capsule(s) Oral three times a day with meals  pantoprazole    Tablet 40 milliGRAM(s) Oral before breakfast  sertraline 150 milliGRAM(s) Oral every 24 hours    MEDICATIONS  (PRN):  acetaminophen     Tablet .. 650 milliGRAM(s) Oral every 6 hours PRN Mild Pain (1 - 3)  clonazePAM  Tablet 0.5 milliGRAM(s) Oral every 24 hours PRN anxiety  dextrose Oral Gel 15 Gram(s) Oral once PRN Blood Glucose LESS THAN 70 milliGRAM(s)/deciliter  HYDROmorphone  Injectable 1 milliGRAM(s) IV Push once PRN Moderate Pain (4 - 6)  melatonin 5 milliGRAM(s) Oral at bedtime PRN Sleep

## 2023-02-23 NOTE — DISCHARGE NOTE PROVIDER - NPI NUMBER (FOR SYSADMIN USE ONLY) :
[[id number]] Albendazole Counseling:  I discussed with the patient the risks of albendazole including but not limited to cytopenia, kidney damage, nausea/vomiting and severe allergy.  The patient understands that this medication is being used in an off-label manner.

## 2023-02-23 NOTE — PROGRESS NOTE ADULT - REASON FOR ADMISSION
R great toe OM

## 2023-02-23 NOTE — PROGRESS NOTE ADULT - SUBJECTIVE AND OBJECTIVE BOX
Patient is a 62y old  Female who presents with a chief complaint of R great toe OM (23 Feb 2023 07:29)      INTERVAL HPI/ OVERNIGHT EVENTS: Pt evaluated at bedside in AM. Noted to have slept well o/n, denies pain to R surgical site 2/2 neuropathy. Dressings are dry, with mild sanguinous strikethrough ACE bandage layer.       LABS                        11.5   10.71 )-----------( 435      ( 23 Feb 2023 08:41 )             35.2     02-23    135  |  98  |  21  ----------------------------<  199<H>  4.2   |  27  |  1.01    Ca    9.5      23 Feb 2023 08:41  Phos  3.8     02-23  Mg     1.7     02-23    TPro  7.0  /  Alb  3.0<L>  /  TBili  0.2  /  DBili  x   /  AST  30  /  ALT  24  /  AlkPhos  108  02-22    PT/INR - ( 22 Feb 2023 05:30 )   PT: 12.7 sec;   INR: 1.07          PTT - ( 22 Feb 2023 05:30 )  PTT:30.3 sec    ICU Vital Signs Last 24 Hrs  T(C): 37 (23 Feb 2023 06:52), Max: 37.1 (22 Feb 2023 17:57)  T(F): 98.6 (23 Feb 2023 06:52), Max: 98.8 (22 Feb 2023 17:57)  HR: 79 (23 Feb 2023 06:52) (65 - 96)  BP: 131/77 (23 Feb 2023 06:52) (128/80 - 145/73)  BP(mean): 94 (22 Feb 2023 21:49) (94 - 107)  ABP: --  ABP(mean): --  RR: 17 (23 Feb 2023 06:52) (17 - 160)  SpO2: 98% (23 Feb 2023 06:52) (91% - 100%)    O2 Parameters below as of 23 Feb 2023 06:52  Patient On (Oxygen Delivery Method): nasal cannula  O2 Flow (L/min): 3          RADIOLOGY  Post op XR Wet Read: shows clean amputation site at level of proximal hallucal phalanx. Negative for soft tissue gas or acute signs of residual OM.    MICROBIOLOGY  R toe clean margin:  - NGTD    PHYSICAL EXAM  Lower Extremity Focused  Vasc: right DP 1/4, PT biphasic on doppler, left DP/PT 2/4, right dorsal foot with mild non pitting edema, Right great toe with dusky changes to level of MPJ  Derm: right great toe: [id number] wound to distal aspect, predebridement: hyperkeratotic cap with underlying fibrotic plug: post debridement: fibrotic base, probes to bone, +malodor, + seropurulent drainage, macerated border, periwound erythema, no crepitus or fluctuance   2/22/2023: 90% fibrotic base to the [id number] wound, lateral plantar edge probes to bone, no longer malodorous, no maceration, minimal seropurulent drainage observed on dressing.   2/23/2023: pt s/p R hallux distal symes amp (2/22); closed primarily with nylon suture  Neuro: Protective sensation absent   MSK: 5/5 muscle strength in all compartments no pain with ROM of R ankle or 1st MPJ, right medial lower leg tender to palpation

## 2023-02-23 NOTE — PHYSICAL THERAPY INITIAL EVALUATION ADULT - PERTINENT HX OF CURRENT PROBLEM, REHAB EVAL
Pt is a 61 yo female presenting w/ pus/foul-smelling drainage from R great toe; s/p Right foot distal symes amputation of hallux on 2/22/23

## 2023-02-23 NOTE — DISCHARGE NOTE PROVIDER - ATTENDING DISCHARGE PHYSICAL EXAMINATION:
-Gen: NAD, resting in bed, pleasant  -HEENT: EOMI, PERRL, no scleral icterus, no JVD, no bruits  -CV: normal S1 and S2, no murmurs appreciated   -Lungs: CTABL, normal respiratory effort on RA  -Ab: soft, NT, ND, normal BS  -Ext: no LE edema, right toe amputation site wrapped  -Neuro: A&O x 3, no focal deficits

## 2023-02-23 NOTE — DISCHARGE NOTE PROVIDER - HOSPITAL COURSE
61yo F PMH CAD and MI s/p ETELVINA x2 (2012/2013), T2DM c/b peripheral neuropathy, HTN, PAD w/ b/l arterial stents, CVA (no residual deficits), mild early dementia presented w/ foul-smelling drainage from R great toe. MRI w/ osteomyelitis and patient underwent R 1st toe amputation on 2/22 with podiatry.     Problem List/Main Diagnoses (problem-based):    Problem/Plan - 1:  ·  Problem: Osteomyelitis of toe.   ·  Plan: Presented w/ pus/foul-smelling drainage from the R great toenail. Not currently in pain. XR showing bone erosion and possible subcutaneous emphysema c/f osteomyelitis. ESR and CRP elevated w/ leukocytosis. VSS. S/p Vancomycin 1g and Zosyn 3.375g in the ED. Seen by podiatry in the ED, planning for likely amputation after MRI, which will be used to determine level of amputation.    - follow up XR foot official read  - MRI R foot       -Osteomyelitis of the distal first phalanx.      -Subacute appearing fracture of the proximal aspect of the proximal third phalanx  - s/p amputation 2/22 with podiatry  - deep wound Cx gram neg rods + gram positive cocci in pairs  - BCx 2/20 NGTD  - d/c w/ SS bactrim BID for 10 days  - f/u final wound pathology with podiatry as outpatient      Problem/Plan - 2:  ·  Problem: T2DM (type 2 diabetes mellitus).   ·  Plan: Known T2DM, takes Metformin 500mg BID and Insulin Basaglar 15u qd at home. States her most recent a1c has been ~12%. Also takes Gabapentin 300mg qd for neuropathy and Creon (Pancrelipase) 2 caps TID w/ meals at home. A1C 12.     - c/w metformin 500mg BID  - begin ___ weekly  - continue gabapentin and pancrelipase     Problem/Plan - 3:  ·  Problem: Peripheral artery disease.   ·  Plan: Hx of PAD, s/p 3 stents, with upcoming stent placement March 3 on the R leg. Most recent stent placed 2 weeks ago  -c/w plavix.     Problem/Plan - 4:  ·  Problem: History of multiple myocardial infarctions.   ·  Plan: #CAD  Known CAD and 2 prior MIs s/p ETELVINA x2. First stent 2013, most recent stent placed 3 months ago. Takes ASA 81mg qd, Plavix 75mg qd and Atorvastatin 40mg qhs at home.  - continue ASA, plavix, atorvastatin     Problem/Plan - 5:  ·  Problem: HTN (hypertension).   ·  Plan: Known HTN, takes HCTZ 25mg qAM and Metoprolol tartrate 25mg BID at home.  - continue HCTZ and metoprolol      Problem/Plan - 6:  ·  Problem: GERD (gastroesophageal reflux disease).   ·  Plan: Known GERD, takes Omeprazole 20mg qAM at home.  - pantoprazole 40mg qAM inpatient therapeutic interchange.     Problem/Plan - 7:  ·  Problem: Anxiety and depression.   ·  Plan: Known depression, takes Sertraline 150mg qAM and Clonazepam 0.5mg qd PRN at home.  - continue sertraline  - continue clonazepam PRN.    New medications:   Bactrim  BID for 10 days 2/24-3/5  _____ weekly    Exam to be followed outpatient:   Podiatry; Endocrinology 63yo F PMH CAD and MI s/p ETELVINA x2 (2012/2013), T2DM c/b peripheral neuropathy, HTN, PAD w/ b/l arterial stents, CVA (no residual deficits), mild early dementia presented w/ foul-smelling drainage from R great toe. MRI w/ osteomyelitis and patient underwent R 1st toe amputation on 2/22 with podiatry.     Problem List/Main Diagnoses (problem-based):    Problem/Plan - 1:  ·  Problem: Osteomyelitis of toe.   ·  Plan: Presented w/ pus/foul-smelling drainage from the R great toenail. Not currently in pain. XR showing bone erosion and possible subcutaneous emphysema c/f osteomyelitis. ESR and CRP elevated w/ leukocytosis. VSS. S/p Vancomycin 1g and Zosyn 3.375g in the ED. Seen by podiatry in the ED, planning for likely amputation after MRI, which will be used to determine level of amputation.    - follow up XR foot official read  - MRI R foot       -Osteomyelitis of the distal first phalanx.      -Subacute appearing fracture of the proximal aspect of the proximal third phalanx  - s/p amputation 2/22 with podiatry  - deep wound Cx gram neg rods + gram positive cocci in pairs  - BCx 2/20 NGTD  - d/c w/ DS bactrim BID for 10 days  - f/u final wound pathology with podiatry as outpatient      Problem/Plan - 2:  ·  Problem: T2DM (type 2 diabetes mellitus).   ·  Plan: Known T2DM, takes Metformin 500mg BID and Insulin Basaglar 15u qd at home. States her most recent a1c has been ~12%. Also takes Gabapentin 300mg qd for neuropathy and Creon (Pancrelipase) 2 caps TID w/ meals at home. A1C 12.     - c/w metformin 500mg BID  - begin ozempic 0.25mg subQ weekly  - continue gabapentin and pancrelipase     Problem/Plan - 3:  ·  Problem: Peripheral artery disease.   ·  Plan: Hx of PAD, s/p 3 stents, with upcoming stent placement March 3 on the R leg. Most recent stent placed 2 weeks ago  -c/w plavix.     Problem/Plan - 4:  ·  Problem: History of multiple myocardial infarctions.   ·  Plan: #CAD  Known CAD and 2 prior MIs s/p ETELVINA x2. First stent 2013, most recent stent placed 3 months ago. Takes ASA 81mg qd, Plavix 75mg qd and Atorvastatin 40mg qhs at home.  - continue ASA, plavix, atorvastatin     Problem/Plan - 5:  ·  Problem: HTN (hypertension).   ·  Plan: Known HTN, takes HCTZ 25mg qAM and Metoprolol tartrate 25mg BID at home.  - continue HCTZ and metoprolol      Problem/Plan - 6:  ·  Problem: GERD (gastroesophageal reflux disease).   ·  Plan: Known GERD, takes Omeprazole 20mg qAM at home.  - pantoprazole 40mg qAM inpatient therapeutic interchange.     Problem/Plan - 7:  ·  Problem: Anxiety and depression.   ·  Plan: Known depression, takes Sertraline 150mg qAM and Clonazepam 0.5mg qd PRN at home.  - continue sertraline  - continue clonazepam PRN.    New medications:   Bactrim DS BID for 10 days 2/24-3/5  ozempic 0.25mg subQ weekly    Exam to be followed outpatient:   Podiatry; Endocrinology

## 2023-02-23 NOTE — PROGRESS NOTE ADULT - PROBLEM SELECTOR PLAN 9
F: none  E: replenish PRN  N: dash/tlc + cc  GI ppx: pantoprazole 40mg qAM  DVT ppx: lovenox 40mg q24h  Dispo: [facility]
F: none  E: replenish PRN  N: dash/tlc + cc  GI ppx: pantoprazole 40mg qAM  DVT ppx: lovenox 40mg q24h  Dispo: [facility]
F: none  E: replenish PRN  N: dash/tlc + cc  GI ppx: pantoprazole 40mg qAM  DVT ppx: lovenox 40mg q24h  Dispo: [facility]

## 2023-02-23 NOTE — PHYSICAL THERAPY INITIAL EVALUATION ADULT - GAIT DEVIATIONS NOTED, PT EVAL
fairly steady gait, no lose of balance, decreased heel strike and hip flexion bilaterally./decreased arnoldo/increased time in double stance/decreased step length

## 2023-02-24 ENCOUNTER — TRANSCRIPTION ENCOUNTER (OUTPATIENT)
Age: 63
End: 2023-02-24

## 2023-02-25 ENCOUNTER — TRANSCRIPTION ENCOUNTER (OUTPATIENT)
Age: 63
End: 2023-02-25

## 2023-02-25 LAB
CULTURE RESULTS: SIGNIFICANT CHANGE UP
SPECIMEN SOURCE: SIGNIFICANT CHANGE UP

## 2023-02-28 ENCOUNTER — TRANSCRIPTION ENCOUNTER (OUTPATIENT)
Age: 63
End: 2023-02-28

## 2023-02-28 ENCOUNTER — NON-APPOINTMENT (OUTPATIENT)
Age: 63
End: 2023-02-28

## 2023-03-01 DIAGNOSIS — E11.69 TYPE 2 DIABETES MELLITUS WITH OTHER SPECIFIED COMPLICATION: ICD-10-CM

## 2023-03-01 DIAGNOSIS — I70.201 UNSPECIFIED ATHEROSCLEROSIS OF NATIVE ARTERIES OF EXTREMITIES, RIGHT LEG: ICD-10-CM

## 2023-03-01 DIAGNOSIS — E11.40 TYPE 2 DIABETES MELLITUS WITH DIABETIC NEUROPATHY, UNSPECIFIED: ICD-10-CM

## 2023-03-01 DIAGNOSIS — I25.10 ATHEROSCLEROTIC HEART DISEASE OF NATIVE CORONARY ARTERY WITHOUT ANGINA PECTORIS: ICD-10-CM

## 2023-03-01 DIAGNOSIS — Z95.5 PRESENCE OF CORONARY ANGIOPLASTY IMPLANT AND GRAFT: ICD-10-CM

## 2023-03-01 DIAGNOSIS — E11.319 TYPE 2 DIABETES MELLITUS WITH UNSPECIFIED DIABETIC RETINOPATHY WITHOUT MACULAR EDEMA: ICD-10-CM

## 2023-03-01 DIAGNOSIS — E11.51 TYPE 2 DIABETES MELLITUS WITH DIABETIC PERIPHERAL ANGIOPATHY WITHOUT GANGRENE: ICD-10-CM

## 2023-03-01 DIAGNOSIS — E11.621 TYPE 2 DIABETES MELLITUS WITH FOOT ULCER: ICD-10-CM

## 2023-03-01 DIAGNOSIS — M86.9 OSTEOMYELITIS, UNSPECIFIED: ICD-10-CM

## 2023-03-01 DIAGNOSIS — L03.031 CELLULITIS OF RIGHT TOE: ICD-10-CM

## 2023-03-01 DIAGNOSIS — F17.210 NICOTINE DEPENDENCE, CIGARETTES, UNCOMPLICATED: ICD-10-CM

## 2023-03-01 DIAGNOSIS — F41.8 OTHER SPECIFIED ANXIETY DISORDERS: ICD-10-CM

## 2023-03-01 DIAGNOSIS — Z79.4 LONG TERM (CURRENT) USE OF INSULIN: ICD-10-CM

## 2023-03-01 DIAGNOSIS — F02.A0 DEMENTIA IN OTHER DISEASES CLASSIFIED ELSEWHERE, MILD, WITHOUT BEHAVIORAL DISTURBANCE, PSYCHOTIC DISTURBANCE, MOOD DISTURBANCE, AND ANXIETY: ICD-10-CM

## 2023-03-01 DIAGNOSIS — G20 PARKINSON'S DISEASE: ICD-10-CM

## 2023-03-01 DIAGNOSIS — Z79.84 LONG TERM (CURRENT) USE OF ORAL HYPOGLYCEMIC DRUGS: ICD-10-CM

## 2023-03-01 DIAGNOSIS — E11.65 TYPE 2 DIABETES MELLITUS WITH HYPERGLYCEMIA: ICD-10-CM

## 2023-03-24 ENCOUNTER — TRANSCRIPTION ENCOUNTER (OUTPATIENT)
Age: 63
End: 2023-03-24

## 2023-03-24 ENCOUNTER — NON-APPOINTMENT (OUTPATIENT)
Age: 63
End: 2023-03-24

## 2023-03-28 ENCOUNTER — APPOINTMENT (OUTPATIENT)
Dept: ENDOCRINOLOGY | Facility: CLINIC | Age: 63
End: 2023-03-28

## 2023-06-01 ENCOUNTER — RX RENEWAL (OUTPATIENT)
Age: 63
End: 2023-06-01

## 2023-06-23 ENCOUNTER — APPOINTMENT (OUTPATIENT)
Dept: HOME HEALTH SERVICES | Facility: HOME HEALTH | Age: 63
End: 2023-06-23

## 2023-07-17 ENCOUNTER — NON-APPOINTMENT (OUTPATIENT)
Age: 63
End: 2023-07-17

## 2023-07-17 RX ORDER — ONDANSETRON 4 MG/1
4 TABLET ORAL
Qty: 9 | Refills: 0 | Status: ACTIVE | COMMUNITY
Start: 2023-07-17 | End: 1900-01-01

## 2023-07-19 ENCOUNTER — NON-APPOINTMENT (OUTPATIENT)
Age: 63
End: 2023-07-19

## 2023-07-20 RX ORDER — LIDOCAINE 3.5 G/1
3.5 PATCH TOPICAL DAILY
Qty: 30 | Refills: 0 | Status: DISCONTINUED | COMMUNITY
Start: 2023-07-17 | End: 2023-07-20

## 2023-07-27 ENCOUNTER — APPOINTMENT (OUTPATIENT)
Dept: ORTHOPEDIC SURGERY | Facility: CLINIC | Age: 63
End: 2023-07-27

## 2023-08-22 ENCOUNTER — NON-APPOINTMENT (OUTPATIENT)
Age: 63
End: 2023-08-22

## 2023-08-23 ENCOUNTER — APPOINTMENT (OUTPATIENT)
Dept: HOME HEALTH SERVICES | Facility: HOME HEALTH | Age: 63
End: 2023-08-23
Payer: MEDICARE

## 2023-08-23 VITALS
OXYGEN SATURATION: 98 % | HEIGHT: 64 IN | HEART RATE: 78 BPM | TEMPERATURE: 96.8 F | RESPIRATION RATE: 18 BRPM | DIASTOLIC BLOOD PRESSURE: 62 MMHG | SYSTOLIC BLOOD PRESSURE: 126 MMHG

## 2023-08-23 DIAGNOSIS — R26.81 UNSTEADINESS ON FEET: ICD-10-CM

## 2023-08-23 PROCEDURE — 99349 HOME/RES VST EST MOD MDM 40: CPT

## 2023-08-23 NOTE — PHYSICAL EXAM
[No Acute Distress] : no acute distress [Normal Voice/Communication] : normal voice communication [Normal Sclera/Conjunctiva] : normal sclera/conjunctiva [EOMI] : extra ocular movement intact [Normal Outer Ear/Nose] : the ears and nose were normal in appearance [Normal TMs] : both tympanic membranes were normal [No JVD] : no jugular venous distention [No LAD] : no lymphadenopathy [No Respiratory Distress] : no respiratory distress [Clear to Auscultation] : lungs were clear to auscultation bilaterally [No Accessory Muscle Use] : no accessory muscle use [Normal Rate] : heart rate was normal  [Regular Rhythm] : with a regular rhythm [Normal S1, S2] : normal S1 and S2 [No Murmurs] : no murmurs heard [No Edema] : there was no peripheral edema [Breast Exam Declined] : patient declined to have breast exam done [Normal Bowel Sounds] : normal bowel sounds [Non Tender] : non-tender [Soft] : abdomen soft [Not Distended] : not distended [Patient Refused] : rectal exam was refused by the patient [Normal Post Cervical Nodes] : no posterior cervical lymphadenopathy [Normal Anterior Cervical Nodes] : no anterior cervical lymphadenopathy [No CVA Tenderness] : no ~M costovertebral angle tenderness [Kyphosis] : no kyphosis present [Scoliosis] : scoliosis not present [No Joint Swelling] : no joint swelling seen [Cranial Nerves Intact] : cranial nerves 2-12 were intact [Normal Reflexes] : deep tendon reflexes were 2+ and symmetric [Oriented x3] : oriented to person, place, and time [Over the Past 2 Weeks, Have You Felt Down, Depressed, or Hopeless?] : 1.) Over the past 2 weeks, have you felt down, depressed, or hopeless? No [Over the Past 2 Weeks, Have You Felt Little Interest or Pleasure Doing Things?] : 2.) Over the past 2 weeks, have you felt little interest or pleasure doing things? No [Foot Ulcers] : no foot ulcers [de-identified] : patient refused [de-identified] : unsteady gait [de-identified] : dry rash on the abdomen and face [de-identified] : right great toe amputee

## 2023-08-23 NOTE — COUNSELING
[Obese -  ( BMI  >29.9 )] : obese - ( BMI  >29.9 ) [DASH diet recommended] : DASH diet recommended [Sodium restriction 2gm recommended] : sodium restriction 2 gm recommended [Smoker, interested in quitting; smoking cessation counseling given (5 mins) and resources provided] : smoker, interested in quitting; smoking cessation counseling given (5 minutes) and resources provided [Smoke/CO Detectors] : smoke/CO detectors [] : foot exam [Patient not on disease-modifying anti-rheumatic drug due to overall prognosis] : Patient not on disease-modifying anti-rheumatic drug due to overall prognosis [Completed] : Aspirin use discussion completed [Improve weight] : improve weight [Decrease stress] : decrease stress [Minimize unnecessary interventions] : minimize unnecessary interventions [Discussed disease trajectory with patient/caregiver] : discussed disease trajectory with patient/caregiver [Advanced Directives discussed: ____] : Advanced directives discussed: [unfilled] [[facility] Date: _____] [facility]: [unfilled] [de-identified] : Patient reports No Trach

## 2023-08-23 NOTE — CHRONIC CARE ASSESSMENT
[Less than 3 Times Per Week] : exercises less than 3 times per week [< 30 Minutes/Session] : (< 30 minutes per session) [Walking] : walking [Aerobic Conditioning] : aerobic conditioning [Diabetic Diet] : diabetic [Low Fat Diet] : low fat [Low Carb Diet] : low carbohydrate [Low Salt Diet] : low salt [General Adherence] : and is generally adherent [de-identified] : none [de-identified] : does not exercise regularly due to caregiving [de-identified] : ADA, low fat, low sodium- but patient is non adherent [PPS Score: ____] : Palliative Performance Scale (PPS) Score: [unfilled]

## 2023-08-23 NOTE — HISTORY OF PRESENT ILLNESS
[House Calls Co-Management Patient] : [unfilled] is a House Calls co-management patient [Referred] : has been referred for aide services [Patient is stable] : patient is stable [Diagnosis tests ordered] : diagnosis tests ordered [Patient] : patient [FreeTextEntry1] : Type 2 Diabetes on insulin with complications, Hypertension, Hyperlipidemia, CAD s/p 2 stents, Mass on Right Kidney, 7 TIAs,    2 MIs, Major Depressive disorder, Smoker [FreeTextEntry2] : 08/23/2023 COVID SCREEN: Patient or caretaker denies fever, cough, trouble breathing, rash, vomiting. Patient has not been in close contact with anyone who is COVID-19 positive, or suspected of having COVID-19.  N95 mask, gloves, eye wear and gown (if indicated) used during visit: Yes.  Total face to face time with patient is 45 min.  HAROON MONET is an 63 year with Type 2 Diabetes on insulin with complications, Hypertension, Hyperlipidemia, CAD s/p 2 stents, Mass on Right Kidney, 7 TIAs, 2 MIs, Major Depressive disorder, Smoker, JOSETTE, COPD with asthma, GERD, Bipolar, Arthritis, right great toe amputation seen today for follow-up home visit.  Patient reports recent dizziness. Denies headache, chest pain, palpitation, distress, n/v. Patient also reports of dry itchy rash on the abdomen and face for months. She reports using OTC treatment with no good effect.   Patient's last hospitalization was 3 months ago for right great toe infection and toe was amputated.   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   Patient/ patient's caregiver reports no weight loss >10 lbs in the past 6 months. No changes in dentition or swallowing reported, No changes in hearing or vision reported. No changes in Cognition reported. Patient reports depression but denies any suicidal or homicidal ideation. Patient is ADL independent and IADL independent. No changes in gait. Reports of falls with no injury months ago.  Patient's home environment is safe.   Goals of care discussed.

## 2023-08-23 NOTE — REASON FOR VISIT
[Follow-Up] : a follow-up visit [Family Member] : family member [Other: _____] : [unfilled] [Pre-Visit Preparation] : pre-visit preparation was done [Intercurrent Specialty/Sub-specialty Visits] : the patient has intercurrent specialty/sub-specialty visits [FreeTextEntry1] : Type 2 Diabetes on insulin with complications, Hypertension, Hyperlipidemia, CAD s/p 2 stents, Mass on Right Kidney, 7 TIAs,    2 MIs, Major Depressive disorder, Smoker [FreeTextEntry3] : Cardiology, Pain Management, Psychiatry, Neurology

## 2023-08-23 NOTE — CURRENT MEDS
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

## 2023-08-25 ENCOUNTER — NON-APPOINTMENT (OUTPATIENT)
Age: 63
End: 2023-08-25

## 2023-08-25 LAB
ANION GAP SERPL CALC-SCNC: 11 MMOL/L
BUN SERPL-MCNC: 11 MG/DL
CALCIUM SERPL-MCNC: 9.4 MG/DL
CHLORIDE SERPL-SCNC: 104 MMOL/L
CHOLEST SERPL-MCNC: 121 MG/DL
CO2 SERPL-SCNC: 25 MMOL/L
CREAT SERPL-MCNC: 0.78 MG/DL
EGFR: 86 ML/MIN/1.73M2
ESTIMATED AVERAGE GLUCOSE: 189 MG/DL
GLUCOSE SERPL-MCNC: 106 MG/DL
HBA1C MFR BLD HPLC: 8.2 %
HDLC SERPL-MCNC: 35 MG/DL
LDLC SERPL CALC-MCNC: 59 MG/DL
NONHDLC SERPL-MCNC: 86 MG/DL
POTASSIUM SERPL-SCNC: 4.6 MMOL/L
SODIUM SERPL-SCNC: 141 MMOL/L
TRIGL SERPL-MCNC: 159 MG/DL

## 2023-08-29 ENCOUNTER — APPOINTMENT (OUTPATIENT)
Dept: BREAST CENTER | Facility: CLINIC | Age: 63
End: 2023-08-29
Payer: MEDICARE

## 2023-08-29 ENCOUNTER — NON-APPOINTMENT (OUTPATIENT)
Age: 63
End: 2023-08-29

## 2023-08-29 ENCOUNTER — APPOINTMENT (OUTPATIENT)
Dept: BREAST CENTER | Facility: CLINIC | Age: 63
End: 2023-08-29

## 2023-08-29 VITALS
HEART RATE: 85 BPM | WEIGHT: 212 LBS | HEIGHT: 64 IN | SYSTOLIC BLOOD PRESSURE: 120 MMHG | DIASTOLIC BLOOD PRESSURE: 78 MMHG | OXYGEN SATURATION: 95 % | BODY MASS INDEX: 36.19 KG/M2

## 2023-08-29 DIAGNOSIS — Z78.9 OTHER SPECIFIED HEALTH STATUS: ICD-10-CM

## 2023-08-29 PROBLEM — Z00.00 ENCOUNTER FOR PREVENTIVE HEALTH EXAMINATION: Status: ACTIVE | Noted: 2023-08-29

## 2023-08-29 PROCEDURE — 99204 OFFICE O/P NEW MOD 45 MIN: CPT

## 2023-08-29 RX ORDER — PANCRELIPASE 60000; 12000; 38000 [USP'U]/1; [USP'U]/1; [USP'U]/1
12000-38000 CAPSULE, DELAYED RELEASE PELLETS ORAL
Qty: 90 | Refills: 3 | Status: DISCONTINUED | COMMUNITY
Start: 2023-01-04 | End: 2023-08-29

## 2023-08-29 RX ORDER — SULFAMETHOXAZOLE AND TRIMETHOPRIM 800; 160 MG/1; MG/1
800-160 TABLET ORAL
Qty: 14 | Refills: 0 | Status: DISCONTINUED | COMMUNITY
Start: 2023-02-07 | End: 2023-08-29

## 2023-08-29 NOTE — DATA REVIEWED
[FreeTextEntry1] : 7/31/20 ([facility]) b/l screening mammogram/US: scattered areas of fibroglandular density. No suspicious masses, architectural distortion, or significant calcifications are detected. Nodularity in the anterior outer right breast is unchanged. Nodularity with associated calcification noted left retroareolar breast is unchanged. Right breast: There are no solid or suspicious lesions identified in the right breast. Left breast:Subareolar breast measuring 0.3 x 0.4 x 0.2 cm. Possible solid nodule. New. There is no pathological lymphadenopathy in either axilla. IMPRESSION: Possible new solid nodule in the immediate subareolar left breast identified sonographically. FOLLOW-UP: Additional imaging. Targeted sonography of the immediate subareolar left breast directly radiology supervision recommended to determine if findings represent true new mass. ASSESSMENT: BI-RADS Category 0  8/12/20 ([facility]) left breast US: In the periareolar left breast within the skin there is a 0.3 cm hypoechoic nodule noted. This has the appearance of a sebaceous cyst. No lesions are identified within the breast tissue. IMPRESSION: Findings suggestive of a sebaceous cyst within the left periareolar region. This is new. FOLLOW-UP: Follow-up imaging in 6 months. Six-month follow-up targeted left breast sonography recommended to ensure stability or evaluate for resolution. ASSESSMENT: BI-RADS Category 3: Probably benign.  5/20/21 ([facility]) left breast US: Subareolar breast measuring 0.3 x 0.3 x 0.2 cm. Sebaceous cyst. Unchanged. IMPRESSION: Stable cysts noted left subareolar breast. FOLLOW-UP: Follow-up imaging in 2 months. BI-RADS Category 3: Probably benign.

## 2023-08-29 NOTE — PHYSICAL EXAM
[Normocephalic] : normocephalic [No Supraclavicular Adenopathy] : no supraclavicular adenopathy [Examined in the supine and seated position] : examined in the supine and seated position [No dominant masses] : no dominant masses in right breast  [No dominant masses] : no dominant masses left breast [No Nipple Retraction] : no left nipple retraction [No Nipple Discharge] : no left nipple discharge [No Axillary Lymphadenopathy] : no left axillary lymphadenopathy [No Ulceration] : no ulceration [de-identified] : heather-areolar incision well healed, 4:00 2cmFN tenderness, no dominant mass  [de-identified] : no nipple discharge appreciated on physical exam  [de-identified] : diffuse open comedones of b/l breast skin most notable in LIQ

## 2023-08-29 NOTE — REVIEW OF SYSTEMS
[As Noted in HPI] : as noted in HPI [Negative] : Heme/Lymph [FreeTextEntry2] : night sweats [FreeTextEntry3] : right forehead lesion with redness and associated tenderness  [FreeTextEntry9] : muscle pain  [de-identified] : excessive thirst

## 2023-08-29 NOTE — PAST MEDICAL HISTORY
[Menarche Age ____] : age at menarche was [unfilled] [Approximately ___] : the LMP was approximately [unfilled] [Total Preg ___] : G[unfilled] [Live Births ___] : P[unfilled]  [Living ___] : Living: [unfilled] [Postmenopausal] : The patient is postmenopausal [Menopause Age____] : age at menopause was [unfilled] [History of Hormone Replacement Treatment] : has no history of hormone replacement treatment
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
## 2023-08-29 NOTE — ASSESSMENT
[FreeTextEntry1] : 63 year old with left breast pain and history of left nipple discharge ongoing for the last 5 years. Discussed clinical exam findings with patient, no dominant mass in the retroareolar region of the left breast, tenderness noted in the 4:00 region without palpable mass, and nipple discharge non-reproducible on exam today. Discussed the likely benign nature of non-spontaneous, non bloody/clear nipple discharge. Reviewed etiologies of non-spontaneous/multi duct milky/green nipple discharge. Discussed although unilateral given her nipple discharge reported is chronic, will proceed with TSH and prolactin to rule out endocrine etiologies. Discussed recommendation to continue to observe, provided education regarding cessation of manipulating/attempting to express the discharge. Patient was made aware to notify the office should nipple discharge increase/return, become brown, bloody, clear, or spontaneous in nature.  Reviewed patients last mammogram/US in 7/2020 and follow up left breast US 8/2020 and 5/2021. Reviewed there was presence of a subareaolar sebaceous cyst on targeted US of the left breast in 5/2021, the white discharge could be attributed to this finding; however, patient is past due for imaging and given h/o nipple discharge and left breast pain, will proceed with b/l diagnostic mammogram/US at this time. Reviewed NCCN cancer screening guidelines for the average risk individual. Discussed if imaging is benign, patient will follow up in 6 months for re-examination.   Of note, noted likely infected lesion of right forehead and swelling of right eye, advised patient to follow up with homecare PCP and if unable to be evaluated today, advised patient to follow up in the ED given facial swelling for further evaluation and treatment.  The patient verbalized understanding and is in agreement with the plan.

## 2023-08-30 LAB
PROLACTIN SERPL-MCNC: 4.3 NG/ML
TSH SERPL-ACNC: 4.82 UIU/ML

## 2023-09-05 ENCOUNTER — APPOINTMENT (OUTPATIENT)
Dept: DERMATOLOGY | Facility: CLINIC | Age: 63
End: 2023-09-05

## 2023-09-27 ENCOUNTER — INPATIENT (INPATIENT)
Facility: HOSPITAL | Age: 63
LOS: 3 days | Discharge: HOME | DRG: 854 | End: 2023-10-01
Attending: STUDENT IN AN ORGANIZED HEALTH CARE EDUCATION/TRAINING PROGRAM | Admitting: STUDENT IN AN ORGANIZED HEALTH CARE EDUCATION/TRAINING PROGRAM
Payer: MEDICARE

## 2023-09-27 ENCOUNTER — TRANSCRIPTION ENCOUNTER (OUTPATIENT)
Age: 63
End: 2023-09-27

## 2023-09-27 ENCOUNTER — NON-APPOINTMENT (OUTPATIENT)
Age: 63
End: 2023-09-27

## 2023-09-27 VITALS
DIASTOLIC BLOOD PRESSURE: 76 MMHG | HEART RATE: 104 BPM | TEMPERATURE: 98 F | OXYGEN SATURATION: 96 % | WEIGHT: 210.1 LBS | SYSTOLIC BLOOD PRESSURE: 112 MMHG | HEIGHT: 64 IN | RESPIRATION RATE: 17 BRPM

## 2023-09-27 DIAGNOSIS — Z86.73 PERSONAL HISTORY OF TRANSIENT ISCHEMIC ATTACK (TIA), AND CEREBRAL INFARCTION WITHOUT RESIDUAL DEFICITS: ICD-10-CM

## 2023-09-27 DIAGNOSIS — J44.9 CHRONIC OBSTRUCTIVE PULMONARY DISEASE, UNSPECIFIED: ICD-10-CM

## 2023-09-27 DIAGNOSIS — Z98.890 OTHER SPECIFIED POSTPROCEDURAL STATES: Chronic | ICD-10-CM

## 2023-09-27 DIAGNOSIS — A41.9 SEPSIS, UNSPECIFIED ORGANISM: ICD-10-CM

## 2023-09-27 DIAGNOSIS — E11.9 TYPE 2 DIABETES MELLITUS WITHOUT COMPLICATIONS: ICD-10-CM

## 2023-09-27 DIAGNOSIS — M54.30 SCIATICA, UNSPECIFIED SIDE: ICD-10-CM

## 2023-09-27 DIAGNOSIS — Z90.89 ACQUIRED ABSENCE OF OTHER ORGANS: Chronic | ICD-10-CM

## 2023-09-27 DIAGNOSIS — I25.2 OLD MYOCARDIAL INFARCTION: ICD-10-CM

## 2023-09-27 DIAGNOSIS — Z29.9 ENCOUNTER FOR PROPHYLACTIC MEASURES, UNSPECIFIED: ICD-10-CM

## 2023-09-27 DIAGNOSIS — E11.621 TYPE 2 DIABETES MELLITUS WITH FOOT ULCER: ICD-10-CM

## 2023-09-27 DIAGNOSIS — Z90.49 ACQUIRED ABSENCE OF OTHER SPECIFIED PARTS OF DIGESTIVE TRACT: Chronic | ICD-10-CM

## 2023-09-27 DIAGNOSIS — Z98.49 CATARACT EXTRACTION STATUS, UNSPECIFIED EYE: Chronic | ICD-10-CM

## 2023-09-27 DIAGNOSIS — F32.9 MAJOR DEPRESSIVE DISORDER, SINGLE EPISODE, UNSPECIFIED: ICD-10-CM

## 2023-09-27 DIAGNOSIS — G47.00 INSOMNIA, UNSPECIFIED: ICD-10-CM

## 2023-09-27 DIAGNOSIS — R07.89 OTHER CHEST PAIN: ICD-10-CM

## 2023-09-27 DIAGNOSIS — I10 ESSENTIAL (PRIMARY) HYPERTENSION: ICD-10-CM

## 2023-09-27 DIAGNOSIS — I25.10 ATHEROSCLEROTIC HEART DISEASE OF NATIVE CORONARY ARTERY WITHOUT ANGINA PECTORIS: ICD-10-CM

## 2023-09-27 LAB
ANION GAP SERPL CALC-SCNC: 9 MMOL/L — SIGNIFICANT CHANGE UP (ref 5–17)
APTT BLD: 29.1 SEC — SIGNIFICANT CHANGE UP (ref 24.5–35.6)
BASOPHILS # BLD AUTO: 0.09 K/UL — SIGNIFICANT CHANGE UP (ref 0–0.2)
BASOPHILS NFR BLD AUTO: 0.7 % — SIGNIFICANT CHANGE UP (ref 0–2)
BUN SERPL-MCNC: 22 MG/DL — SIGNIFICANT CHANGE UP (ref 7–23)
CALCIUM SERPL-MCNC: 9.9 MG/DL — SIGNIFICANT CHANGE UP (ref 8.4–10.5)
CHLORIDE SERPL-SCNC: 101 MMOL/L — SIGNIFICANT CHANGE UP (ref 96–108)
CO2 SERPL-SCNC: 25 MMOL/L — SIGNIFICANT CHANGE UP (ref 22–31)
CREAT SERPL-MCNC: 0.83 MG/DL — SIGNIFICANT CHANGE UP (ref 0.5–1.3)
CRP SERPL-MCNC: 13.6 MG/L — HIGH (ref 0–4)
EGFR: 80 ML/MIN/1.73M2 — SIGNIFICANT CHANGE UP
EOSINOPHIL # BLD AUTO: 0.37 K/UL — SIGNIFICANT CHANGE UP (ref 0–0.5)
EOSINOPHIL NFR BLD AUTO: 2.9 % — SIGNIFICANT CHANGE UP (ref 0–6)
ERYTHROCYTE [SEDIMENTATION RATE] IN BLOOD: 87 MM/HR — HIGH
GLUCOSE BLDC GLUCOMTR-MCNC: 153 MG/DL — HIGH (ref 70–99)
GLUCOSE SERPL-MCNC: 152 MG/DL — HIGH (ref 70–99)
HCT VFR BLD CALC: 39.6 % — SIGNIFICANT CHANGE UP (ref 34.5–45)
HGB BLD-MCNC: 12.3 G/DL — SIGNIFICANT CHANGE UP (ref 11.5–15.5)
IMM GRANULOCYTES NFR BLD AUTO: 0.5 % — SIGNIFICANT CHANGE UP (ref 0–0.9)
INR BLD: 0.95 — SIGNIFICANT CHANGE UP (ref 0.85–1.18)
LACTATE SERPL-SCNC: 1.3 MMOL/L — SIGNIFICANT CHANGE UP (ref 0.5–2)
LYMPHOCYTES # BLD AUTO: 19.9 % — SIGNIFICANT CHANGE UP (ref 13–44)
LYMPHOCYTES # BLD AUTO: 2.52 K/UL — SIGNIFICANT CHANGE UP (ref 1–3.3)
MCHC RBC-ENTMCNC: 25.4 PG — LOW (ref 27–34)
MCHC RBC-ENTMCNC: 31.1 GM/DL — LOW (ref 32–36)
MCV RBC AUTO: 81.6 FL — SIGNIFICANT CHANGE UP (ref 80–100)
MONOCYTES # BLD AUTO: 0.76 K/UL — SIGNIFICANT CHANGE UP (ref 0–0.9)
MONOCYTES NFR BLD AUTO: 6 % — SIGNIFICANT CHANGE UP (ref 2–14)
NEUTROPHILS # BLD AUTO: 8.87 K/UL — HIGH (ref 1.8–7.4)
NEUTROPHILS NFR BLD AUTO: 70 % — SIGNIFICANT CHANGE UP (ref 43–77)
NRBC # BLD: 0 /100 WBCS — SIGNIFICANT CHANGE UP (ref 0–0)
PLATELET # BLD AUTO: 388 K/UL — SIGNIFICANT CHANGE UP (ref 150–400)
POTASSIUM SERPL-MCNC: 4.5 MMOL/L — SIGNIFICANT CHANGE UP (ref 3.5–5.3)
POTASSIUM SERPL-SCNC: 4.5 MMOL/L — SIGNIFICANT CHANGE UP (ref 3.5–5.3)
PROTHROM AB SERPL-ACNC: 10.8 SEC — SIGNIFICANT CHANGE UP (ref 9.5–13)
RBC # BLD: 4.85 M/UL — SIGNIFICANT CHANGE UP (ref 3.8–5.2)
RBC # FLD: 17.1 % — HIGH (ref 10.3–14.5)
SODIUM SERPL-SCNC: 135 MMOL/L — SIGNIFICANT CHANGE UP (ref 135–145)
TROPONIN T, HIGH SENSITIVITY RESULT: 20 NG/L — SIGNIFICANT CHANGE UP (ref 0–51)
TROPONIN T, HIGH SENSITIVITY RESULT: 25 NG/L — SIGNIFICANT CHANGE UP (ref 0–51)
WBC # BLD: 12.67 K/UL — HIGH (ref 3.8–10.5)
WBC # FLD AUTO: 12.67 K/UL — HIGH (ref 3.8–10.5)

## 2023-09-27 PROCEDURE — 93010 ELECTROCARDIOGRAM REPORT: CPT

## 2023-09-27 PROCEDURE — 99222 1ST HOSP IP/OBS MODERATE 55: CPT | Mod: GC

## 2023-09-27 PROCEDURE — 73630 X-RAY EXAM OF FOOT: CPT | Mod: 26,RT

## 2023-09-27 PROCEDURE — 99285 EMERGENCY DEPT VISIT HI MDM: CPT

## 2023-09-27 RX ORDER — PANTOPRAZOLE SODIUM 20 MG/1
40 TABLET, DELAYED RELEASE ORAL
Refills: 0 | Status: DISCONTINUED | OUTPATIENT
Start: 2023-09-27 | End: 2023-10-01

## 2023-09-27 RX ORDER — BUPROPION HYDROCHLORIDE 150 MG/1
150 TABLET, EXTENDED RELEASE ORAL DAILY
Refills: 0 | Status: DISCONTINUED | OUTPATIENT
Start: 2023-09-27 | End: 2023-10-01

## 2023-09-27 RX ORDER — CLOPIDOGREL BISULFATE 75 MG/1
75 TABLET, FILM COATED ORAL DAILY
Refills: 0 | Status: DISCONTINUED | OUTPATIENT
Start: 2023-09-27 | End: 2023-09-30

## 2023-09-27 RX ORDER — LANOLIN ALCOHOL/MO/W.PET/CERES
3 CREAM (GRAM) TOPICAL AT BEDTIME
Refills: 0 | Status: DISCONTINUED | OUTPATIENT
Start: 2023-09-27 | End: 2023-10-01

## 2023-09-27 RX ORDER — DEXTROSE 50 % IN WATER 50 %
25 SYRINGE (ML) INTRAVENOUS ONCE
Refills: 0 | Status: DISCONTINUED | OUTPATIENT
Start: 2023-09-27 | End: 2023-10-01

## 2023-09-27 RX ORDER — IPRATROPIUM/ALBUTEROL SULFATE 18-103MCG
3 AEROSOL WITH ADAPTER (GRAM) INHALATION EVERY 6 HOURS
Refills: 0 | Status: DISCONTINUED | OUTPATIENT
Start: 2023-09-27 | End: 2023-10-01

## 2023-09-27 RX ORDER — ASPIRIN/CALCIUM CARB/MAGNESIUM 324 MG
81 TABLET ORAL DAILY
Refills: 0 | Status: DISCONTINUED | OUTPATIENT
Start: 2023-09-27 | End: 2023-09-27

## 2023-09-27 RX ORDER — INFLUENZA VIRUS VACCINE 15; 15; 15; 15 UG/.5ML; UG/.5ML; UG/.5ML; UG/.5ML
0.5 SUSPENSION INTRAMUSCULAR ONCE
Refills: 0 | Status: DISCONTINUED | OUTPATIENT
Start: 2023-09-27 | End: 2023-10-01

## 2023-09-27 RX ORDER — INSULIN LISPRO 100/ML
VIAL (ML) SUBCUTANEOUS
Refills: 0 | Status: DISCONTINUED | OUTPATIENT
Start: 2023-09-27 | End: 2023-10-01

## 2023-09-27 RX ORDER — VANCOMYCIN HCL 1 G
1000 VIAL (EA) INTRAVENOUS ONCE
Refills: 0 | Status: COMPLETED | OUTPATIENT
Start: 2023-09-27 | End: 2023-09-27

## 2023-09-27 RX ORDER — ASPIRIN/CALCIUM CARB/MAGNESIUM 324 MG
81 TABLET ORAL ONCE
Refills: 0 | Status: COMPLETED | OUTPATIENT
Start: 2023-09-27 | End: 2023-09-27

## 2023-09-27 RX ORDER — PIPERACILLIN AND TAZOBACTAM 4; .5 G/20ML; G/20ML
3.38 INJECTION, POWDER, LYOPHILIZED, FOR SOLUTION INTRAVENOUS ONCE
Refills: 0 | Status: COMPLETED | OUTPATIENT
Start: 2023-09-27 | End: 2023-09-27

## 2023-09-27 RX ORDER — GLUCAGON INJECTION, SOLUTION 0.5 MG/.1ML
1 INJECTION, SOLUTION SUBCUTANEOUS ONCE
Refills: 0 | Status: DISCONTINUED | OUTPATIENT
Start: 2023-09-27 | End: 2023-10-01

## 2023-09-27 RX ORDER — SODIUM CHLORIDE 9 MG/ML
1000 INJECTION, SOLUTION INTRAVENOUS
Refills: 0 | Status: DISCONTINUED | OUTPATIENT
Start: 2023-09-27 | End: 2023-10-01

## 2023-09-27 RX ORDER — DEXTROSE 50 % IN WATER 50 %
15 SYRINGE (ML) INTRAVENOUS ONCE
Refills: 0 | Status: DISCONTINUED | OUTPATIENT
Start: 2023-09-27 | End: 2023-10-01

## 2023-09-27 RX ORDER — ENOXAPARIN SODIUM 100 MG/ML
40 INJECTION SUBCUTANEOUS EVERY 12 HOURS
Refills: 0 | Status: DISCONTINUED | OUTPATIENT
Start: 2023-09-27 | End: 2023-09-30

## 2023-09-27 RX ORDER — GABAPENTIN 400 MG/1
300 CAPSULE ORAL AT BEDTIME
Refills: 0 | Status: DISCONTINUED | OUTPATIENT
Start: 2023-09-27 | End: 2023-09-28

## 2023-09-27 RX ORDER — SERTRALINE 25 MG/1
100 TABLET, FILM COATED ORAL DAILY
Refills: 0 | Status: DISCONTINUED | OUTPATIENT
Start: 2023-09-27 | End: 2023-10-01

## 2023-09-27 RX ORDER — DEXTROSE 50 % IN WATER 50 %
25 SYRINGE (ML) INTRAVENOUS ONCE
Refills: 0 | Status: DISCONTINUED | OUTPATIENT
Start: 2023-09-27 | End: 2023-09-28

## 2023-09-27 RX ORDER — ASPIRIN/CALCIUM CARB/MAGNESIUM 324 MG
81 TABLET ORAL DAILY
Refills: 0 | Status: DISCONTINUED | OUTPATIENT
Start: 2023-09-28 | End: 2023-09-30

## 2023-09-27 RX ORDER — DEXTROSE 50 % IN WATER 50 %
12.5 SYRINGE (ML) INTRAVENOUS ONCE
Refills: 0 | Status: DISCONTINUED | OUTPATIENT
Start: 2023-09-27 | End: 2023-10-01

## 2023-09-27 RX ORDER — ACETAMINOPHEN 500 MG
650 TABLET ORAL EVERY 6 HOURS
Refills: 0 | Status: DISCONTINUED | OUTPATIENT
Start: 2023-09-27 | End: 2023-10-01

## 2023-09-27 RX ORDER — ATORVASTATIN CALCIUM 80 MG/1
80 TABLET, FILM COATED ORAL AT BEDTIME
Refills: 0 | Status: DISCONTINUED | OUTPATIENT
Start: 2023-09-27 | End: 2023-10-01

## 2023-09-27 RX ADMIN — Medication 650 MILLIGRAM(S): at 21:51

## 2023-09-27 RX ADMIN — GABAPENTIN 300 MILLIGRAM(S): 400 CAPSULE ORAL at 21:49

## 2023-09-27 RX ADMIN — ATORVASTATIN CALCIUM 80 MILLIGRAM(S): 80 TABLET, FILM COATED ORAL at 21:49

## 2023-09-27 RX ADMIN — Medication 3 MILLILITER(S): at 21:49

## 2023-09-27 RX ADMIN — Medication 250 MILLIGRAM(S): at 18:39

## 2023-09-27 RX ADMIN — CLOPIDOGREL BISULFATE 75 MILLIGRAM(S): 75 TABLET, FILM COATED ORAL at 21:49

## 2023-09-27 RX ADMIN — Medication 2: at 22:39

## 2023-09-27 RX ADMIN — Medication 650 MILLIGRAM(S): at 22:51

## 2023-09-27 RX ADMIN — Medication 81 MILLIGRAM(S): at 21:49

## 2023-09-27 RX ADMIN — PIPERACILLIN AND TAZOBACTAM 200 GRAM(S): 4; .5 INJECTION, POWDER, LYOPHILIZED, FOR SOLUTION INTRAVENOUS at 18:05

## 2023-09-27 NOTE — ED PROVIDER NOTE - MUSCULOSKELETAL, MLM
right foot - 1st toe amputation with skin changes to third toe described above, no distal nv deficit, left lower ext wnl

## 2023-09-27 NOTE — H&P ADULT - PROBLEM SELECTOR PLAN 7
Patient has a history of MDD and previous suicide ideation but denies current symptoms. Takes sertraline 100mg QD.  - C/w home meds Patient has a >50 pack year smoking history with chronic cough. She says she has COPD for many years but is not taking any medication. Slightly wheezy on physical exam.   - Obtain collateral from PCP  - Luke ford

## 2023-09-27 NOTE — H&P ADULT - PROBLEM SELECTOR PLAN 2
Patient has history of T2DM with last documented A1C 12%. On Synjardy and insulin glargine at home.   - mSSS   - F/u A1C R/o OM  Patient has a history of T2DM c/b neuropathy and osteomyelitis requiring amputation (completed by Sharda Vital). Has had a wound growing on her L 3rd toe for 2 weeks that has progressively worsened despite cleaning and disinfecting with over the counter products. Received Zosyn and Vanc in ED.   - F/u x-ray final read  - F/u podiatry recs - rec MRI, local wound care

## 2023-09-27 NOTE — H&P ADULT - NSHPPHYSICALEXAM_GEN_ALL_CORE
.  VITAL SIGNS:  T(C): 37 (09-27-23 @ 16:25), Max: 37 (09-27-23 @ 16:25)  T(F): 98.6 (09-27-23 @ 16:25), Max: 98.6 (09-27-23 @ 16:25)  HR: 94 (09-27-23 @ 16:25) (94 - 104)  BP: 104/74 (09-27-23 @ 16:25) (104/74 - 112/76)  BP(mean): --  RR: 17 (09-27-23 @ 16:25) (17 - 17)  SpO2: 95% (09-27-23 @ 16:25) (95% - 96%)  Wt(kg): --    PHYSICAL EXAM:    Constitutional: WDWN resting comfortably in bed; NAD  Head: NC/AT  Eyes: PERRL, EOMI, anicteric sclera  ENT: no nasal discharge; uvula midline, no oropharyngeal erythema or exudates; MMM  Neck: supple; no JVD or thyromegaly  Respiratory: CTA B/L; no W/R/R, no retractions  Cardiac: +S1/S2; RRR; no M/R/G; PMI non-displaced  Gastrointestinal: abdomen soft, NT/ND; no rebound or guarding; +BSx4  Back: spine midline, no bony tenderness or step-offs; no CVAT B/L  Extremities: WWP, no clubbing or cyanosis; no peripheral edema  Musculoskeletal: NROM x4; no joint swelling, tenderness or erythema  Vascular: 2+ radial, femoral, DP/PT pulses B/L  Dermatologic: skin warm, dry and intact; no rashes, wounds, or scars  Lymphatic: no submandibular or cervical LAD  Neurologic: AAOx3; CNII-XII grossly intact; no focal deficits  Psychiatric: affect and characteristics of appearance, verbalizations, behaviors are appropriate .  VITAL SIGNS:  T(C): 37 (09-27-23 @ 16:25), Max: 37 (09-27-23 @ 16:25)  T(F): 98.6 (09-27-23 @ 16:25), Max: 98.6 (09-27-23 @ 16:25)  HR: 94 (09-27-23 @ 16:25) (94 - 104)  BP: 104/74 (09-27-23 @ 16:25) (104/74 - 112/76)  BP(mean): --  RR: 17 (09-27-23 @ 16:25) (17 - 17)  SpO2: 95% (09-27-23 @ 16:25) (95% - 96%)  Wt(kg): --    PHYSICAL EXAM:    Constitutional: Obese female with lesions on her face. WDWN resting comfortably in bed; NAD  Head: NC/AT  Eyes: PERRL, EOMI, anicteric sclera  ENT: Poor dentition, no nasal discharge; uvula midline, no oropharyngeal erythema or exudates; MMM  Neck: supple; no JVD or thyromegaly  Respiratory: crackles clearing with cough, mild wheeze no R/R, no retractions  Cardiac: +S1/S2; RRR; no M/R/G; PMI non-displaced  Gastrointestinal: obese abdomen soft, NT/ND; no rebound or guarding; +BSx4  Back: spine midline, no bony tenderness or step-offs; no CVAT B/L  Extremities: WWP, no clubbing or cyanosis; no peripheral edema  Musculoskeletal: NROM x4; no joint swelling, tenderness or erythema, left foot bandaged, middle toe with open ulcer unstageable   Vascular: 2+ radial, femoral, DP/PT pulses B/L  Dermatologic: skin warm, dry  Lymphatic: no submandibular or cervical LAD  Neurologic: AAOx3; CNII-XII grossly intact; no focal deficits  Psychiatric: affect and characteristics of appearance, verbalizations, behaviors are appropriate

## 2023-09-27 NOTE — H&P ADULT - PROBLEM SELECTOR PLAN 9
Patient complaining of chest discomfort that feels like being punched in the chest. She also has left shoulder discomfort that is constant and worse with movement. She describes her anginal equivalent as an elephant sitting on her chest.   - F/u EKG and trops Patient complaining of chest discomfort that feels like being punched in the chest. She also has left shoulder discomfort that is constant and worse with movement. She describes her anginal equivalent as an elephant sitting on her chest.   - F/u EKG and trops  - F/u 10pm trops Patient complaining of chest discomfort that feels like being punched in the chest, occurring intermittently for weeks. She also has left shoulder discomfort that is constant and worse with movement. She describes her anginal equivalent as an elephant sitting on her chest.   - EKH nsr, non ischemic.   - Trops negative x2  - continue to monitor Patient complaining of chest discomfort that feels like being punched in the chest, occurring intermittently for weeks. Localized, non radiating. She also has left shoulder discomfort that is constant and worse with movement. She describes her anginal equivalent as an elephant sitting on her chest.   - EKG nsr, non ischemic.   - Trops negative x2  - continue to monitor Patient complaining of chest discomfort that feels like being punched in the chest, occurring intermittently for weeks. Localized, non radiating. She describes her anginal equivalent as an elephant sitting on her chest. She also has left shoulder discomfort that is constant and worse with movement. Symptoms possibly msk related   - EKG nsr, non ischemic.   - Trops negative x2  - continue to monitor Patient complaining of chest discomfort that feels like being punched in the chest, occurring intermittently for weeks. Localized, non radiating. She describes her anginal equivalent as an elephant sitting on her chest. She also has left shoulder discomfort that is constant and worse with movement. +tender to palpation on exam. Symptoms likely msk related   - EKG nsr, non ischemic.   - Trops negative x2  - continue to monitor

## 2023-09-27 NOTE — H&P ADULT - PROBLEM SELECTOR PLAN 8
F: PO  E: Mg >2, K>4  N: Normal diet  DVT: ImproV 1.5, Lovenox 40mg QD Patient has a history of MDD and previous suicide ideation but denies current symptoms. Takes sertraline 100mg QD.  - C/w sertraline

## 2023-09-27 NOTE — ED PROVIDER NOTE - CLINICAL SUMMARY MEDICAL DECISION MAKING FREE TEXT BOX
Pt with hx of DM with right third toe wound x several days, hx of 1st right toe amputation 2/23, broad spectrum labs obtained, xray foot and podiatry called to eval pt.    Pending eval and dispo Pt with hx of DM with right third toe wound x several days, hx of 1st right toe amputation 2/23, broad spectrum labs obtained, xray foot and podiatry called to eval pt.    Pending eval and dispo  As per podiatry - requires iv antibiotics  Discussed with LARRY.

## 2023-09-27 NOTE — H&P ADULT - NSICDXFAMHXNEG_GEN_ALL
atrial fibrillation/brain aneurysm/cancer/congestive heart failure/coronary disease/diabetes/dementia/emphysema/hypertension/irritable bowel syndrome/kidney disease/stroke/thyroid disease/VTE

## 2023-09-27 NOTE — H&P ADULT - NSHPREVIEWOFSYSTEMS_GEN_ALL_CORE
REVIEW OF SYSTEMS:    CONSTITUTIONAL: No weakness, fevers or chills  EYES/ENT: No visual changes, no blurry vision;  No vertigo or throat pain   NECK: No pain or stiffness  RESPIRATORY: No cough, wheezing, hemoptysis; No shortness of breath  CARDIOVASCULAR: No chest pain or palpitations  GASTROINTESTINAL: No abdominal or epigastric pain. No nausea, vomiting, or hematemesis; No diarrhea or constipation. No melena or hematochezia.  GENITOURINARY: No dysuria, frequency or hematuria  NEUROLOGICAL: No numbness or weakness  SKIN: No itching, rashes REVIEW OF SYSTEMS:    CONSTITUTIONAL: No weakness, fevers or chills  EYES/ENT: Reports worsening blurry vision requiring glasses + magnifying glass + extra light  No vertigo or throat pain   NECK:  Reports neck pain and stiffness  RESPIRATORY: Long history of productive cough 2/2 smoking; Feels short of breath after walking a few feet, cannot climb stairs  CARDIOVASCULAR: Has chest pain that feels like someone punched her in the chest. Also has constant left shoulder pain. These symptoms are different to her anginal sx from her MI which she describes as an elephant sitting on her chest  GASTROINTESTINAL: Reports frequent bloating without stool changes. No nausea, vomiting, or hematemesis; No diarrhea or constipation. No melena or hematochezia.  GENITOURINARY: No dysuria, frequency or hematuria  MSK: Has severe back pain radiating down her legs that she attributes to her sciatica  NEUROLOGICAL: Chronic diabetic neuropathy affecting sensation and strength b/l LE  SKIN: Has had skin changes since June when she noticed rashes and raised lesions over her face and body but did not seek treatment. Still present but improved since then (she has pictures on her phone)

## 2023-09-27 NOTE — H&P ADULT - NSICDXPASTMEDICALHX_GEN_ALL_CORE_FT
PAST MEDICAL HISTORY:  Amputation of toe     CAD (coronary artery disease)     COPD without exacerbation     CVA (cerebrovascular accident)     HTN (hypertension)     Mild early onset Alzheimer's dementia     Myocardial infarction     PAD (peripheral artery disease)     Type 2 diabetes mellitus

## 2023-09-27 NOTE — H&P ADULT - NSICDXPASTSURGICALHX_GEN_ALL_CORE_FT
PAST SURGICAL HISTORY:  History of bladder suspension procedure     History of cholecystectomy     History of tonsillectomy     S/P breast lumpectomy     S/P cataract surgery

## 2023-09-27 NOTE — PATIENT PROFILE ADULT - FALL HARM RISK - HARM RISK INTERVENTIONS

## 2023-09-27 NOTE — H&P ADULT - PROBLEM SELECTOR PLAN 4
History of MIs requiring stents. Plan as above. History of multiple MIs requiring stents. Plan as above. Patient with CAD and MI requiring multiple stents, in 2012/2013 with last placement unclear.   - F/u collateral from cardiologist  - C/w aspirin 81mg  - C/w statin

## 2023-09-27 NOTE — H&P ADULT - PROBLEM SELECTOR PLAN 5
Patient has a hx of HTN and is prescribed Benzepril/HCTZ and metoprolol but has not been taking these medications due her BP being normal  - Hold and restart when appropriate History of multiple MIs requiring stents. Plan as above.

## 2023-09-27 NOTE — ED PROVIDER NOTE - PATIENT'S PREFERRED PRONOUN
Her/She
You can access the FollowMyHealth Patient Portal offered by [facility] by registering at the following website: [email or web address]. By joining [facility]’s FollowMyHealth portal, you will also be able to view your health information using other applications (apps) compatible with our system.

## 2023-09-27 NOTE — H&P ADULT - PROBLEM SELECTOR PLAN 11
Patient reports having insomnia for several months.    - Started melatonin 3mg   - C/w sertraline 100mg  - C/w gabapentin

## 2023-09-27 NOTE — H&P ADULT - PROBLEM SELECTOR PLAN 1
Patient has a history of T2DM c/b neuropathy and osteomyelitis requiring amputation (done by Sharda Vital). Has had a wound growing on her L 3rd toe for 2 weeks that has progressively worsened despite cleaning and disinfecting with over the counter products. Received Zosyn and Vanc in ED. Pending X-ray of L foot.   - F/u x-ray   - F/u podiatry recs  - F/u wound care Patient has a history of T2DM c/b neuropathy and osteomyelitis requiring amputation (completed by Sharda Vital). Has had a wound growing on her L 3rd toe for 2 weeks that has progressively worsened despite cleaning and disinfecting with over the counter products. Received Zosyn and Vanc in ED. Pending X-ray of L foot.   - F/u x-ray   - F/u podiatry recs  - F/u wound care Present on admission (2/4 SIRS criteria for tachycardia and leukocytosis) with identified source. Received IV Vanc and Zosyn. Lactate 1.3.     -Trend CMP  - Vanc trough before 4th dose Present on admission (2/4 SIRS criteria for tachycardia and leukocytosis) with identified source. Received IV Vanc and Zosyn. Lactate 1.3.     -Trend CMP  - Vanc trough before 4th dose  - F/u BCX, WCX

## 2023-09-27 NOTE — ED ADULT TRIAGE NOTE - CHIEF COMPLAINT QUOTE
Pt c/o R middle toe pain, drainage x weeks. Pt reports previous amputation of R great toe. Pt also c/o wounds to b/l eyebrows. Pt denies cp, sob. PMH DM, MIx2, TIAx7, COPD.

## 2023-09-27 NOTE — CONSULT NOTE ADULT - SUBJECTIVE AND OBJECTIVE BOX
Attending: Dr. Alvaro Robin, DPM    Patient is a 63y old  Female who presents with a chief complaint of Toe wound possible OM (27 Sep 2023 17:27)      HPI:  61yo F PMH CAD and MI s/p ETELVINA x2 (2012/2013), T2DM c/b peripheral neuropathy (last A1c 12% 2/2023) , HTN (not taking prescribed medications), PAD w/ b/l arterial stents, CVA (no residual deficits), mild early dementia presented w/ foul-smelling drainage from R great toe. MRI w/ osteomyelitis and patient underwent R 1st toe amputation on 2/22 with podiatry.    Today presents with right third toe skin color changes in addition to wound on right third plantar aspect of foot x several days.  Pt unsure if she had trauma to toe as pt has neuropathy and can not feel feet.  Denies fever or chills.  Has not seen Dr. Robin as outpt for these symptoms.   (27 Sep 2023 17:27)    Podiatry:  62F PMH CAD w/ stents, DM, HTN, PAD w/ left LE arterial stent, CVA (no residual), MI x 2 (most recent 2013), peripheral neuropathy, mild early dementia, recent R hallux amputation p/w right 3rd digit wound going on for a few weeks. Pt noticed pus coming out of digit the other week however has been helping transition her mother into a nursing home and has not been able to come into the hospital until now. Notes some tenderness to digit as well as subjective fevers and chills at home. No other pedal complaints at this time. Denies any N/V/CP a this time. Pt has SOB 2/2 COPD.     Review of systems negative except per HPI and as stated below  General:	 no weakness; no fevers, no chills  Skin/Breast: no rash  Respiratory and Thorax: no SOB, no cough  Cardiovascular:	No chest pain  Gastrointestinal:	 no nausea, vomiting , diarrhea  Genitourinary:	no dysuria, no difficulty urinating, no hematuria  Musculoskeletal:	no weakness, no joint swelling/pain  Neurological:	no focal weakness/numbness  Endocrine:	no polyuria, no polydipsia    PAST MEDICAL & SURGICAL HISTORY:    Home Medications:  aspirin 81 mg oral tablet: 1 tab(s) orally once a day (21 Feb 2023 11:21)  atorvastatin 40 mg oral tablet: 1 tab(s) orally once a day (21 Feb 2023 11:24)  clopidogrel 75 mg oral tablet: 1 tab(s) orally every 24 hours (23 Feb 2023 14:31)  Creon 36,000 units oral delayed release capsule: 2 cap(s) orally 3 times a day (21 Feb 2023 11:26)  gabapentin 300 mg oral tablet: 1 tab(s) orally once a day (21 Feb 2023 11:22)  hydroCHLOROthiazide 25 mg oral tablet: 1 tab(s) orally once a day (21 Feb 2023 11:20)  metFORMIN 500 mg oral tablet: 1 tab(s) orally 2 times a day (21 Feb 2023 11:24)  metoprolol tartrate 25 mg oral tablet: 1 tab(s) orally 2 times a day (21 Feb 2023 11:23)  Nuedexta 20 mg-10 mg oral capsule: 1 cap(s) orally every 12 hours (21 Feb 2023 11:23)  omeprazole 20 mg oral delayed release capsule: 1 cap(s) orally once a day (21 Feb 2023 11:22)  sertraline 150 mg oral capsule: 1 cap(s) orally once a day (21 Feb 2023 11:22)    Allergies    No Known Allergies    Intolerances      FAMILY HISTORY:    Social History:       LABS                        12.3   12.67 )-----------( 388      ( 27 Sep 2023 16:03 )             39.6     09-27    135  |  101  |  22  ----------------------------<  152<H>  4.5   |  25  |  0.83    Ca    9.9      27 Sep 2023 16:03      PT/INR - ( 27 Sep 2023 16:03 )   PT: 10.8 sec;   INR: 0.95          PTT - ( 27 Sep 2023 16:03 )  PTT:29.1 sec    Vital Signs Last 24 Hrs  T(C): 37 (27 Sep 2023 16:25), Max: 37 (27 Sep 2023 16:25)  T(F): 98.6 (27 Sep 2023 16:25), Max: 98.6 (27 Sep 2023 16:25)  HR: 94 (27 Sep 2023 16:25) (94 - 104)  BP: 104/74 (27 Sep 2023 16:25) (104/74 - 112/76)  BP(mean): --  RR: 17 (27 Sep 2023 16:25) (17 - 17)  SpO2: 95% (27 Sep 2023 16:25) (95% - 96%)    Parameters below as of 27 Sep 2023 16:25  Patient On (Oxygen Delivery Method): room air        PHYSICAL EXAM  General: NAD, AA0x3    Lower Extremity Focused:  Vasc: right DP 1/4, PT biphasic on doppler, left DP/PT 2/4,  Derm: R great toe amp well healed, new open wound to R 3rd digit tip with surrounding hyperkeratotic tissue. Negative for malodor, serosanguinous drainage, -PTB, is too superficial for culture.   Neuro: Protective sensation is absent  MSK: 5/5 muscle strength in all compartments, has tenderness on palpation to R 3rd distal digit.    RADIOLOGY  XR wet read: cortical erosion and periosteal reaction at R 3rd digit concerning for OM.

## 2023-09-27 NOTE — ED PROVIDER NOTE - OBJECTIVE STATEMENT
Prior dc summary February 2023: 63yo F PMH CAD and MI s/p ETELVINA x2 (2012/2013), T2DM c/b peripheral neuropathy, HTN, PAD w/ b/l arterial stents, CVA (no residual deficits), mild early dementia presented w/ foul-smelling drainage from R great toe. MRI w/ osteomyelitis and patient underwent R 1st toe amputation on 2/22 with podiatry.    Today presents with Prior dc summary February 2023: 61yo F PMH CAD and MI s/p ETELVINA x2 (2012/2013), T2DM c/b peripheral neuropathy, HTN, PAD w/ b/l arterial stents, CVA (no residual deficits), mild early dementia presented w/ foul-smelling drainage from R great toe. MRI w/ osteomyelitis and patient underwent R 1st toe amputation on 2/22 with podiatry.    Today presents with right third toe skin color changes in addition to wound on right third plantar aspect of foot x several days.  Pt unsure if she had trauma to toe as pt has neuropathy and can not feel feet.  Denies fever or chills.  Has not seen Dr. Robin as outpt for these symptoms.

## 2023-09-27 NOTE — H&P ADULT - PROBLEM SELECTOR PLAN 10
<<----- Click to add NO pertinent Past Medical History No pertinent past medical history Patient reports having a herniated disc that causes severe back pain with radiation down both legs. Takes gabapentin at home.   - C/w gabapentin 300mg PO QD

## 2023-09-27 NOTE — H&P ADULT - HISTORY OF PRESENT ILLNESS
61yo F PMH CAD and MI s/p ETELVINA x2 (2012/2013), T2DM c/b peripheral neuropathy, HTN, PAD w/ b/l arterial stents, CVA (no residual deficits), mild early dementia presented w/ foul-smelling drainage from R great toe. MRI w/ osteomyelitis and patient underwent R 1st toe amputation on 2/22 with podiatry.    Today presents with right third toe skin color changes in addition to wound on right third plantar aspect of foot x several days.  Pt unsure if she had trauma to toe as pt has neuropathy and can not feel feet.  Denies fever or chills.  Has not seen Dr. Robin as outpt for these symptoms.   63yo F PMH CAD and MI s/p ETELVINA x2 (2012/2013), T2DM c/b peripheral neuropathy (last A1c 12% 2/2023) , HTN (not taking prescribed medications), PAD w/ b/l arterial stents, CVA (no residual deficits), mild early dementia presented w/ foul-smelling drainage from R great toe. MRI w/ osteomyelitis and patient underwent R 1st toe amputation on 2/22 with podiatry.    Today presents with right third toe skin color changes in addition to wound on right third plantar aspect of foot x several days.  Pt unsure if she had trauma to toe as pt has neuropathy and can not feel feet.  Denies fever or chills.  Has not seen Dr. Robin as outpt for these symptoms.   61yo F PMH CAD and MI s/p ETELVINA x2 (2012/2013), T2DM c/b peripheral neuropathy (last A1c 12% 2/2023) , HTN (not taking prescribed medications), PAD w/ b/l arterial stents, CVA (no residual deficits), COPD (?), depression w/ previous SI, mild early dementia presented w/ foul-smelling drainage from R great toe. MRI w/ osteomyelitis and patient underwent R 1st toe amputation on 2/22 with podiatry. 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 She noticed a wound on her 3rd R toe 2 weeks ago that was nonpainful, which she tried to manage with cleaning and hydrogen peroxide but continued to worsen until she decided to come to the ED today. Pt unsure if she had trauma to toe as pt has neuropathy and can not feel feet.  Denies fever or chills.  Has not seen Dr. Robin as outpatientt for these symptoms.     In the ED  Vitals: 98.1, 104, 112/76, 96% RA 17        63yo F PMH CAD and MI s/p ETELVINA x2 (2012/2013), T2DM c/b peripheral neuropathy (last A1c 12% 2/2023) , HTN (not taking prescribed medications), PAD w/ b/l arterial stents, CVA (no residual deficits), COPD (?) with > 50 pack year smoking history, depression w/ previous SI, sciatica and LBB, mild early dementia presented w/ foul-smelling drainage from R great toe. MRI w/ osteomyelitis and patient underwent R 1st toe amputation on 2/22 with podiatry. 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 She noticed a wound on her 3rd R toe 2 weeks ago that was nonpainful, which she tried to manage with cleaning and hydrogen peroxide but continued to worsen until she decided to come to the ED today. Pt unsure if she had trauma to toe as pt has neuropathy and can not feel feet.  Denies fever or chills.  Has not seen Dr. Robin as outpatient for these symptoms.     In the ED  Vitals: 98.1, 104, 112/76, 96% RA 17   CBC: WBC 12.67, Hb 12.3, HCT 39.6, Plt 388  BMP: Na 135, K+ 4.5, Cl 101, CO2 25, BUN 22, Crt 0.83, Gluc 152  Meds: started Zosyn 3.375mg IVPB, Vanc 1g IV  Imaging: XR R foot, read pending   Consulted podiatry      61yo F PMH CAD and MI s/p ETELVINA x2 (2012/2013), T2DM c/b peripheral neuropathy (last A1c 12% 2/2023) , HTN (not taking prescribed medications), PAD w/ b/l arterial stents, CVA (no residual deficits), COPD (?) with > 50 pack year smoking history, depression w/ previous SI, sciatica and LBB, mild early dementia presented w/ foul-smelling drainage from R great toe. MRI w/ osteomyelitis and patient underwent R 1st toe amputation on 2/22 with podiatry. 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 She noticed a wound on her 3rd R toe 2 weeks ago that was nonpainful, which she tried to manage with cleaning and hydrogen peroxide but continued to worsen until she decided to come to the ED today. Pt unsure if she had trauma to toe as pt has neuropathy and can not feel feet.  Denies fever or chills.  Has not seen Dr. Robin as outpatient for these symptoms.     In the ED  Vitals: 98.1, 104, 112/76, 96% RA 17   CBC: WBC 12.67, Hb 12.3, HCT 39.6, Plt 388  BMP: Na 135, K+ 4.5, Cl 101, CO2 25, BUN 22, Crt 0.83, Gluc 152  Meds: started Zosyn 3.375mg IVPB, Vanc 1g IV  Imaging: XR R foot, read pending   Consulted podiatry      63yo F PMH CAD and MI s/p ETELVINA x2 (2012/2013), T2DM c/b peripheral neuropathy (last A1c 12% 2/2023) , HTN (not taking prescribed medications), PAD w/ b/l arterial stents, CVA (no residual deficits), COPD (?) with > 50 pack year smoking history, depression w/ previous SI, sciatica and LBP, undergoing investigation for breast cancer, mild early dementia presented w/ foul-smelling drainage from R great toe. MRI w/ osteomyelitis and patient underwent R 1st toe amputation on 2/22 with podiatry. She has chronic neuropathy from poorly controlled T2DM. She noticed a wound on her 3rd R toe 2 weeks ago that was nonpainful, which she tried to manage with cleaning and hydrogen peroxide but continued to worsen until she decided to come to the ED today. Pt unsure if she had trauma to toe as pt has neuropathy and can not feel feet.  Denies fever or chills.  Has not seen Dr. Robin as outpatient for these symptoms.     In the ED  Vitals: 98.1, 104, 112/76, 96% RA 17   CBC: WBC 12.67, Hb 12.3, HCT 39.6, Plt 388  BMP: Na 135, K+ 4.5, Cl 101, CO2 25, BUN 22, Crt 0.83, Gluc 152  Meds: started Zosyn 3.375mg IVPB, Vanc 1g IV  Imaging: XR R foot, read pending   Consulted podiatry

## 2023-09-27 NOTE — H&P ADULT - NSHPLABSRESULTS_GEN_ALL_CORE
LABS:                         12.3   12.67 )-----------( 388      ( 27 Sep 2023 16:03 )             39.6     09-27    135  |  101  |  22  ----------------------------<  152<H>  4.5   |  25  |  0.83    Ca    9.9      27 Sep 2023 16:03      PT/INR - ( 27 Sep 2023 16:03 )   PT: 10.8 sec;   INR: 0.95          PTT - ( 27 Sep 2023 16:03 )  PTT:29.1 sec  Urinalysis Basic - ( 27 Sep 2023 16:03 )    Color: x / Appearance: x / SG: x / pH: x  Gluc: 152 mg/dL / Ketone: x  / Bili: x / Urobili: x   Blood: x / Protein: x / Nitrite: x   Leuk Esterase: x / RBC: x / WBC x   Sq Epi: x / Non Sq Epi: x / Bacteria: x            Lactate, Blood: 1.3 mmol/L (09-27 @ 16:03)      RADIOLOGY, EKG & ADDITIONAL TESTS:

## 2023-09-27 NOTE — H&P ADULT - PROBLEM SELECTOR PLAN 6
Patient has a >50 pack year smoking history with chronic cough. She says she has COPD for many years but is not taking any medication.   - Get collateral Patient has a hx of HTN and is prescribed Benzepril/HCTZ 10-12.5mg and Toprol 50mg but has not been taking these medications due her BP being normal  - Hold and restart when appropriate

## 2023-09-27 NOTE — H&P ADULT - PROBLEM SELECTOR PLAN 3
Patient with CAD and MI requiring multiple stents, in 2012/2013 with last placement unclear.   - F/u collateral from cardiologist  - C/w aspirin 81mg  - C/w statin Patient has history of T2DM with last documented A1C 12%. On Synjardy and insulin glargine at home.   - mSSS with FSG  - F/u A1C

## 2023-09-27 NOTE — H&P ADULT - ASSESSMENT
Patient is a 63yo F PMH CAD, MI (s/p stents), CVA, PAD, COPD (?), HTN, T2DM w/ neuropathy, depression, previous toe amputation, mild early dementia presenting to the ED for a wound on her L 3rd toe Patient is a 61yo F PMH CAD, MI (s/p stents), CVA, PAD, COPD (?), HTN, T2DM w/ neuropathy, depression, previous toe amputation, mild early dementia presenting to the ED for a wound on her L 3rd toe c/f OM.

## 2023-09-27 NOTE — H&P ADULT - ATTENDING COMMENTS
#Sepsis: 2/2 RLE 3rd digit OM. s/p sepsis fluids. c/w vanc/zosyn, f/up MRI, wound cx, blood cx. Podiatry recs.    #OM: c/f OM 3rd digit RLE. +PTB. XR wet read showing cortical erosion and periosteal reaction. Plan as above. Podiatry recs. And vascular consult

## 2023-09-28 LAB
A1C WITH ESTIMATED AVERAGE GLUCOSE RESULT: 8.3 % — HIGH (ref 4–5.6)
ALBUMIN SERPL ELPH-MCNC: 3.4 G/DL — SIGNIFICANT CHANGE UP (ref 3.3–5)
ALP SERPL-CCNC: 116 U/L — SIGNIFICANT CHANGE UP (ref 40–120)
ALT FLD-CCNC: 9 U/L — LOW (ref 10–45)
ANION GAP SERPL CALC-SCNC: 10 MMOL/L — SIGNIFICANT CHANGE UP (ref 5–17)
AST SERPL-CCNC: 9 U/L — LOW (ref 10–40)
BASOPHILS # BLD AUTO: 0.09 K/UL — SIGNIFICANT CHANGE UP (ref 0–0.2)
BASOPHILS NFR BLD AUTO: 0.9 % — SIGNIFICANT CHANGE UP (ref 0–2)
BILIRUB SERPL-MCNC: 0.3 MG/DL — SIGNIFICANT CHANGE UP (ref 0.2–1.2)
BUN SERPL-MCNC: 24 MG/DL — HIGH (ref 7–23)
CALCIUM SERPL-MCNC: 9.6 MG/DL — SIGNIFICANT CHANGE UP (ref 8.4–10.5)
CHLORIDE SERPL-SCNC: 105 MMOL/L — SIGNIFICANT CHANGE UP (ref 96–108)
CK MB CFR SERPL CALC: 1.1 NG/ML — SIGNIFICANT CHANGE UP (ref 0–6.7)
CK SERPL-CCNC: 31 U/L — SIGNIFICANT CHANGE UP (ref 25–170)
CO2 SERPL-SCNC: 25 MMOL/L — SIGNIFICANT CHANGE UP (ref 22–31)
CREAT SERPL-MCNC: 0.82 MG/DL — SIGNIFICANT CHANGE UP (ref 0.5–1.3)
EGFR: 81 ML/MIN/1.73M2 — SIGNIFICANT CHANGE UP
EOSINOPHIL # BLD AUTO: 0.29 K/UL — SIGNIFICANT CHANGE UP (ref 0–0.5)
EOSINOPHIL NFR BLD AUTO: 2.8 % — SIGNIFICANT CHANGE UP (ref 0–6)
ESTIMATED AVERAGE GLUCOSE: 192 MG/DL — HIGH (ref 68–114)
GLUCOSE BLDC GLUCOMTR-MCNC: 127 MG/DL — HIGH (ref 70–99)
GLUCOSE BLDC GLUCOMTR-MCNC: 146 MG/DL — HIGH (ref 70–99)
GLUCOSE BLDC GLUCOMTR-MCNC: 165 MG/DL — HIGH (ref 70–99)
GLUCOSE BLDC GLUCOMTR-MCNC: 168 MG/DL — HIGH (ref 70–99)
GLUCOSE BLDC GLUCOMTR-MCNC: 241 MG/DL — HIGH (ref 70–99)
GLUCOSE SERPL-MCNC: 145 MG/DL — HIGH (ref 70–99)
HCT VFR BLD CALC: 37.4 % — SIGNIFICANT CHANGE UP (ref 34.5–45)
HGB BLD-MCNC: 11.8 G/DL — SIGNIFICANT CHANGE UP (ref 11.5–15.5)
IMM GRANULOCYTES NFR BLD AUTO: 0.3 % — SIGNIFICANT CHANGE UP (ref 0–0.9)
LYMPHOCYTES # BLD AUTO: 2.84 K/UL — SIGNIFICANT CHANGE UP (ref 1–3.3)
LYMPHOCYTES # BLD AUTO: 27.1 % — SIGNIFICANT CHANGE UP (ref 13–44)
MAGNESIUM SERPL-MCNC: 1.9 MG/DL — SIGNIFICANT CHANGE UP (ref 1.6–2.6)
MCHC RBC-ENTMCNC: 25.3 PG — LOW (ref 27–34)
MCHC RBC-ENTMCNC: 31.6 GM/DL — LOW (ref 32–36)
MCV RBC AUTO: 80.3 FL — SIGNIFICANT CHANGE UP (ref 80–100)
MONOCYTES # BLD AUTO: 0.73 K/UL — SIGNIFICANT CHANGE UP (ref 0–0.9)
MONOCYTES NFR BLD AUTO: 7 % — SIGNIFICANT CHANGE UP (ref 2–14)
NEUTROPHILS # BLD AUTO: 6.49 K/UL — SIGNIFICANT CHANGE UP (ref 1.8–7.4)
NEUTROPHILS NFR BLD AUTO: 61.9 % — SIGNIFICANT CHANGE UP (ref 43–77)
NRBC # BLD: 0 /100 WBCS — SIGNIFICANT CHANGE UP (ref 0–0)
PHOSPHATE SERPL-MCNC: 4 MG/DL — SIGNIFICANT CHANGE UP (ref 2.5–4.5)
PLATELET # BLD AUTO: 370 K/UL — SIGNIFICANT CHANGE UP (ref 150–400)
POTASSIUM SERPL-MCNC: 4.1 MMOL/L — SIGNIFICANT CHANGE UP (ref 3.5–5.3)
POTASSIUM SERPL-SCNC: 4.1 MMOL/L — SIGNIFICANT CHANGE UP (ref 3.5–5.3)
PROT SERPL-MCNC: 7.2 G/DL — SIGNIFICANT CHANGE UP (ref 6–8.3)
RBC # BLD: 4.66 M/UL — SIGNIFICANT CHANGE UP (ref 3.8–5.2)
RBC # FLD: 16.9 % — HIGH (ref 10.3–14.5)
SODIUM SERPL-SCNC: 140 MMOL/L — SIGNIFICANT CHANGE UP (ref 135–145)
TROPONIN T, HIGH SENSITIVITY RESULT: 21 NG/L — SIGNIFICANT CHANGE UP (ref 0–51)
WBC # BLD: 10.47 K/UL — SIGNIFICANT CHANGE UP (ref 3.8–10.5)
WBC # FLD AUTO: 10.47 K/UL — SIGNIFICANT CHANGE UP (ref 3.8–10.5)

## 2023-09-28 PROCEDURE — 73718 MRI LOWER EXTREMITY W/O DYE: CPT | Mod: 26,RT

## 2023-09-28 PROCEDURE — 99233 SBSQ HOSP IP/OBS HIGH 50: CPT | Mod: GC

## 2023-09-28 RX ORDER — VANCOMYCIN HCL 1 G
1500 VIAL (EA) INTRAVENOUS EVERY 12 HOURS
Refills: 0 | Status: DISCONTINUED | OUTPATIENT
Start: 2023-09-28 | End: 2023-09-29

## 2023-09-28 RX ORDER — METOPROLOL TARTRATE 50 MG
50 TABLET ORAL DAILY
Refills: 0 | Status: DISCONTINUED | OUTPATIENT
Start: 2023-09-28 | End: 2023-09-28

## 2023-09-28 RX ORDER — GABAPENTIN 400 MG/1
300 CAPSULE ORAL EVERY 12 HOURS
Refills: 0 | Status: DISCONTINUED | OUTPATIENT
Start: 2023-09-28 | End: 2023-10-01

## 2023-09-28 RX ORDER — INSULIN GLARGINE 100 [IU]/ML
15 INJECTION, SOLUTION SUBCUTANEOUS AT BEDTIME
Refills: 0 | Status: DISCONTINUED | OUTPATIENT
Start: 2023-09-28 | End: 2023-10-01

## 2023-09-28 RX ORDER — PIPERACILLIN AND TAZOBACTAM 4; .5 G/20ML; G/20ML
3.38 INJECTION, POWDER, LYOPHILIZED, FOR SOLUTION INTRAVENOUS ONCE
Refills: 0 | Status: COMPLETED | OUTPATIENT
Start: 2023-09-28 | End: 2023-09-28

## 2023-09-28 RX ORDER — PIPERACILLIN AND TAZOBACTAM 4; .5 G/20ML; G/20ML
3.38 INJECTION, POWDER, LYOPHILIZED, FOR SOLUTION INTRAVENOUS EVERY 8 HOURS
Refills: 0 | Status: DISCONTINUED | OUTPATIENT
Start: 2023-09-29 | End: 2023-10-01

## 2023-09-28 RX ORDER — PIPERACILLIN AND TAZOBACTAM 4; .5 G/20ML; G/20ML
3.38 INJECTION, POWDER, LYOPHILIZED, FOR SOLUTION INTRAVENOUS ONCE
Refills: 0 | Status: COMPLETED | OUTPATIENT
Start: 2023-09-29 | End: 2023-09-29

## 2023-09-28 RX ORDER — METOPROLOL TARTRATE 50 MG
50 TABLET ORAL DAILY
Refills: 0 | Status: DISCONTINUED | OUTPATIENT
Start: 2023-09-28 | End: 2023-09-30

## 2023-09-28 RX ADMIN — CLOPIDOGREL BISULFATE 75 MILLIGRAM(S): 75 TABLET, FILM COATED ORAL at 11:25

## 2023-09-28 RX ADMIN — Medication 300 MILLIGRAM(S): at 11:26

## 2023-09-28 RX ADMIN — Medication 3 MILLILITER(S): at 16:34

## 2023-09-28 RX ADMIN — INSULIN GLARGINE 15 UNIT(S): 100 INJECTION, SOLUTION SUBCUTANEOUS at 21:33

## 2023-09-28 RX ADMIN — Medication 650 MILLIGRAM(S): at 22:33

## 2023-09-28 RX ADMIN — PIPERACILLIN AND TAZOBACTAM 25 GRAM(S): 4; .5 INJECTION, POWDER, LYOPHILIZED, FOR SOLUTION INTRAVENOUS at 14:49

## 2023-09-28 RX ADMIN — Medication 3 MILLILITER(S): at 21:34

## 2023-09-28 RX ADMIN — Medication 3 MILLILITER(S): at 10:50

## 2023-09-28 RX ADMIN — BUPROPION HYDROCHLORIDE 150 MILLIGRAM(S): 150 TABLET, EXTENDED RELEASE ORAL at 11:25

## 2023-09-28 RX ADMIN — ENOXAPARIN SODIUM 40 MILLIGRAM(S): 100 INJECTION SUBCUTANEOUS at 23:00

## 2023-09-28 RX ADMIN — Medication 650 MILLIGRAM(S): at 21:33

## 2023-09-28 RX ADMIN — PIPERACILLIN AND TAZOBACTAM 25 GRAM(S): 4; .5 INJECTION, POWDER, LYOPHILIZED, FOR SOLUTION INTRAVENOUS at 19:25

## 2023-09-28 RX ADMIN — GABAPENTIN 300 MILLIGRAM(S): 400 CAPSULE ORAL at 18:31

## 2023-09-28 RX ADMIN — ATORVASTATIN CALCIUM 80 MILLIGRAM(S): 80 TABLET, FILM COATED ORAL at 21:34

## 2023-09-28 RX ADMIN — Medication 300 MILLIGRAM(S): at 21:34

## 2023-09-28 RX ADMIN — ENOXAPARIN SODIUM 40 MILLIGRAM(S): 100 INJECTION SUBCUTANEOUS at 01:24

## 2023-09-28 RX ADMIN — Medication 2: at 21:49

## 2023-09-28 RX ADMIN — PIPERACILLIN AND TAZOBACTAM 200 GRAM(S): 4; .5 INJECTION, POWDER, LYOPHILIZED, FOR SOLUTION INTRAVENOUS at 10:16

## 2023-09-28 RX ADMIN — Medication 50 MILLIGRAM(S): at 18:31

## 2023-09-28 RX ADMIN — ENOXAPARIN SODIUM 40 MILLIGRAM(S): 100 INJECTION SUBCUTANEOUS at 11:25

## 2023-09-28 RX ADMIN — SERTRALINE 100 MILLIGRAM(S): 25 TABLET, FILM COATED ORAL at 11:26

## 2023-09-28 RX ADMIN — PANTOPRAZOLE SODIUM 40 MILLIGRAM(S): 20 TABLET, DELAYED RELEASE ORAL at 06:23

## 2023-09-28 RX ADMIN — Medication 81 MILLIGRAM(S): at 11:25

## 2023-09-28 RX ADMIN — Medication 4: at 13:21

## 2023-09-28 NOTE — PROGRESS NOTE ADULT - PROBLEM SELECTOR PLAN 1
Presented w/ pus/foul-smelling drainage from the R great toenail. Not currently in pain. XR showing bone erosion and possible subcutaneous emphysema c/f osteomyelitis. ESR and CRP elevated w/ leukocytosis. VSS. S/p Vancomycin 1g and Zosyn 3.375g in the ED. Seen by podiatry in the ED, planning for likely amputation after MRI, which will be used to determine level of amputation.    - follow up XR foot official read  - MRI R foot       -Osteomyelitis of the distal first phalanx.      -Subacute appearing fracture of the proximal aspect of the proximal third phalanx  - s/p amputation 2/22 w/ podiatry  - deep wound Cx gram neg rods + gram positive cocci in pairs  - BCx 2/20 NGTD  - Transition to oral abx on discharge MRI, which will be used to determine level of amputation. MRI R foot showed osteomyelitis within the middle and distal phalanges.    - f/u podiatry   - f/u wound/blood cultures   - c/w vanc 1.5g q12h  - c/w Zosyn 3.375g q8h MRI, which will be used to determine level of amputation. MRI R foot showed osteomyelitis within the middle and distal phalanges.  - f/u podiatry   - f/u wound/blood cultures   - c/w vanc 1.5g q12h  - c/w Zosyn 3.375g q8h

## 2023-09-28 NOTE — PROGRESS NOTE ADULT - SUBJECTIVE AND OBJECTIVE BOX
**INCOMPLETE NOTE    OVERNIGHT EVENTS:    SUBJECTIVE:  Patient seen and examined at bedside.    Vital Signs Last 12 Hrs  T(F): 98.5 (09-28-23 @ 05:51), Max: 98.7 (09-27-23 @ 21:34)  HR: 92 (09-28-23 @ 05:51) (92 - 93)  BP: 97/64 (09-28-23 @ 05:51) (97/64 - 126/81)  BP(mean): --  RR: 18 (09-28-23 @ 05:51) (16 - 18)  SpO2: 88% (09-28-23 @ 05:51) (88% - 96%)  I&O's Summary      PHYSICAL EXAM:  Constitutional: NAD, comfortable in bed.  HEENT: NC/AT, PERRLA, EOMI, no conjunctival pallor or scleral icterus, MMM  Neck: Supple, no JVD  Respiratory: CTA B/L. No w/r/r.   Cardiovascular: RRR, normal S1 and S2, no m/r/g.   Gastrointestinal: +BS, soft NTND, no guarding or rebound tenderness, no palpable masses   Extremities: wwp; no cyanosis, clubbing or edema.   Vascular: Pulses equal and strong throughout.   Neurological: AAOx3, no CN deficits, strength and sensation intact throughout.   Skin: No gross skin abnormalities or rashes        LABS:                        12.3   12.67 )-----------( 388      ( 27 Sep 2023 16:03 )             39.6     09-27    135  |  101  |  22  ----------------------------<  152<H>  4.5   |  25  |  0.83    Ca    9.9      27 Sep 2023 16:03      PT/INR - ( 27 Sep 2023 16:03 )   PT: 10.8 sec;   INR: 0.95          PTT - ( 27 Sep 2023 16:03 )  PTT:29.1 sec  Urinalysis Basic - ( 27 Sep 2023 16:03 )    Color: x / Appearance: x / SG: x / pH: x  Gluc: 152 mg/dL / Ketone: x  / Bili: x / Urobili: x   Blood: x / Protein: x / Nitrite: x   Leuk Esterase: x / RBC: x / WBC x   Sq Epi: x / Non Sq Epi: x / Bacteria: x          RADIOLOGY & ADDITIONAL TESTS:    MEDICATIONS  (STANDING):  albuterol/ipratropium for Nebulization 3 milliLiter(s) Nebulizer every 6 hours  aspirin  chewable 81 milliGRAM(s) Oral daily  atorvastatin 80 milliGRAM(s) Oral at bedtime  buPROPion XL (24-Hour) . 150 milliGRAM(s) Oral daily  clopidogrel Tablet 75 milliGRAM(s) Oral daily  dextrose 5%. 1000 milliLiter(s) (100 mL/Hr) IV Continuous <Continuous>  dextrose 5%. 1000 milliLiter(s) (50 mL/Hr) IV Continuous <Continuous>  dextrose 50% Injectable 25 Gram(s) IV Push once  dextrose 50% Injectable 25 Gram(s) IV Push once  dextrose 50% Injectable 12.5 Gram(s) IV Push once  enoxaparin Injectable 40 milliGRAM(s) SubCutaneous every 12 hours  gabapentin 300 milliGRAM(s) Oral at bedtime  glucagon  Injectable 1 milliGRAM(s) IntraMuscular once  influenza   Vaccine 0.5 milliLiter(s) IntraMuscular once  insulin lispro (ADMELOG) corrective regimen sliding scale   SubCutaneous Before meals and at bedtime  pantoprazole    Tablet 40 milliGRAM(s) Oral before breakfast  sertraline 100 milliGRAM(s) Oral daily    MEDICATIONS  (PRN):  acetaminophen     Tablet .. 650 milliGRAM(s) Oral every 6 hours PRN Temp greater or equal to 38C (100.4F), Mild Pain (1 - 3)  dextrose Oral Gel 15 Gram(s) Oral once PRN Blood Glucose LESS THAN 70 milliGRAM(s)/deciliter  melatonin 3 milliGRAM(s) Oral at bedtime PRN Insomnia   OVERNIGHT EVENTS: RITA    SUBJECTIVE:  Patient seen and examined at bedside, pt states that her shoulder has been     Vital Signs Last 12 Hrs  T(F): 98.5 (09-28-23 @ 05:51), Max: 98.7 (09-27-23 @ 21:34)  HR: 92 (09-28-23 @ 05:51) (92 - 93)  BP: 97/64 (09-28-23 @ 05:51) (97/64 - 126/81)  BP(mean): --  RR: 18 (09-28-23 @ 05:51) (16 - 18)  SpO2: 88% (09-28-23 @ 05:51) (88% - 96%)  I&O's Summary      PHYSICAL EXAM:  Constitutional: NAD, comfortable in bed.  HEENT: NC/AT, PERRLA, EOMI, no conjunctival pallor or scleral icterus, MMM  Neck: Supple, no JVD  Respiratory: CTA B/L. No w/r/r.   Cardiovascular: RRR, normal S1 and S2, no m/r/g.   Gastrointestinal: +BS, soft NTND, no guarding or rebound tenderness, no palpable masses   Extremities: wwp; no cyanosis, clubbing or edema.   Vascular: Pulses equal and strong throughout.   Neurological: AAOx3, no CN deficits, strength and sensation intact throughout.   Skin: No gross skin abnormalities or rashes        LABS:                        12.3   12.67 )-----------( 388      ( 27 Sep 2023 16:03 )             39.6     09-27    135  |  101  |  22  ----------------------------<  152<H>  4.5   |  25  |  0.83    Ca    9.9      27 Sep 2023 16:03      PT/INR - ( 27 Sep 2023 16:03 )   PT: 10.8 sec;   INR: 0.95          PTT - ( 27 Sep 2023 16:03 )  PTT:29.1 sec  Urinalysis Basic - ( 27 Sep 2023 16:03 )    Color: x / Appearance: x / SG: x / pH: x  Gluc: 152 mg/dL / Ketone: x  / Bili: x / Urobili: x   Blood: x / Protein: x / Nitrite: x   Leuk Esterase: x / RBC: x / WBC x   Sq Epi: x / Non Sq Epi: x / Bacteria: x          RADIOLOGY & ADDITIONAL TESTS:    MEDICATIONS  (STANDING):  albuterol/ipratropium for Nebulization 3 milliLiter(s) Nebulizer every 6 hours  aspirin  chewable 81 milliGRAM(s) Oral daily  atorvastatin 80 milliGRAM(s) Oral at bedtime  buPROPion XL (24-Hour) . 150 milliGRAM(s) Oral daily  clopidogrel Tablet 75 milliGRAM(s) Oral daily  dextrose 5%. 1000 milliLiter(s) (100 mL/Hr) IV Continuous <Continuous>  dextrose 5%. 1000 milliLiter(s) (50 mL/Hr) IV Continuous <Continuous>  dextrose 50% Injectable 25 Gram(s) IV Push once  dextrose 50% Injectable 25 Gram(s) IV Push once  dextrose 50% Injectable 12.5 Gram(s) IV Push once  enoxaparin Injectable 40 milliGRAM(s) SubCutaneous every 12 hours  gabapentin 300 milliGRAM(s) Oral at bedtime  glucagon  Injectable 1 milliGRAM(s) IntraMuscular once  influenza   Vaccine 0.5 milliLiter(s) IntraMuscular once  insulin lispro (ADMELOG) corrective regimen sliding scale   SubCutaneous Before meals and at bedtime  pantoprazole    Tablet 40 milliGRAM(s) Oral before breakfast  sertraline 100 milliGRAM(s) Oral daily    MEDICATIONS  (PRN):  acetaminophen     Tablet .. 650 milliGRAM(s) Oral every 6 hours PRN Temp greater or equal to 38C (100.4F), Mild Pain (1 - 3)  dextrose Oral Gel 15 Gram(s) Oral once PRN Blood Glucose LESS THAN 70 milliGRAM(s)/deciliter  melatonin 3 milliGRAM(s) Oral at bedtime PRN Insomnia   OVERNIGHT EVENTS: RITA    SUBJECTIVE:  Patient seen and examined at bedside, pt states feels well today with no acute complaints at this time. Pt denies chest pain, SOB, N/V/D, dysuria.    Vital Signs Last 12 Hrs  T(F): 98.5 (09-28-23 @ 05:51), Max: 98.7 (09-27-23 @ 21:34)  HR: 92 (09-28-23 @ 05:51) (92 - 93)  BP: 97/64 (09-28-23 @ 05:51) (97/64 - 126/81)  BP(mean): --  RR: 18 (09-28-23 @ 05:51) (16 - 18)  SpO2: 88% (09-28-23 @ 05:51) (88% - 96%)  I&O's Summary      PHYSICAL EXAM:  Constitutional: NAD, comfortable in bed.  HEENT: NC/AT, no scleral icterus, MMM  Neck: Supple, no JVD  Respiratory: CTA B/L. No w/r/r.   Cardiovascular: RRR, normal S1 and S2, no m/r/g.   Gastrointestinal: +BS, soft NTND, no guarding or rebound tenderness, no palpable masses   Extremities: wwp; no edema. R 3rd toe ulcer/OM.   Vascular: Pulses equal and strong throughout.   Neurological: AAOx3, decreased sensation in b/l LE    Skin: No gross skin abnormalities or rashes        LABS:                        12.3   12.67 )-----------( 388      ( 27 Sep 2023 16:03 )             39.6     09-27    135  |  101  |  22  ----------------------------<  152<H>  4.5   |  25  |  0.83    Ca    9.9      27 Sep 2023 16:03      PT/INR - ( 27 Sep 2023 16:03 )   PT: 10.8 sec;   INR: 0.95          PTT - ( 27 Sep 2023 16:03 )  PTT:29.1 sec  Urinalysis Basic - ( 27 Sep 2023 16:03 )    Color: x / Appearance: x / SG: x / pH: x  Gluc: 152 mg/dL / Ketone: x  / Bili: x / Urobili: x   Blood: x / Protein: x / Nitrite: x   Leuk Esterase: x / RBC: x / WBC x   Sq Epi: x / Non Sq Epi: x / Bacteria: x          RADIOLOGY & ADDITIONAL TESTS:    MEDICATIONS  (STANDING):  albuterol/ipratropium for Nebulization 3 milliLiter(s) Nebulizer every 6 hours  aspirin  chewable 81 milliGRAM(s) Oral daily  atorvastatin 80 milliGRAM(s) Oral at bedtime  buPROPion XL (24-Hour) . 150 milliGRAM(s) Oral daily  clopidogrel Tablet 75 milliGRAM(s) Oral daily  dextrose 5%. 1000 milliLiter(s) (100 mL/Hr) IV Continuous <Continuous>  dextrose 5%. 1000 milliLiter(s) (50 mL/Hr) IV Continuous <Continuous>  dextrose 50% Injectable 25 Gram(s) IV Push once  dextrose 50% Injectable 25 Gram(s) IV Push once  dextrose 50% Injectable 12.5 Gram(s) IV Push once  enoxaparin Injectable 40 milliGRAM(s) SubCutaneous every 12 hours  gabapentin 300 milliGRAM(s) Oral at bedtime  glucagon  Injectable 1 milliGRAM(s) IntraMuscular once  influenza   Vaccine 0.5 milliLiter(s) IntraMuscular once  insulin lispro (ADMELOG) corrective regimen sliding scale   SubCutaneous Before meals and at bedtime  pantoprazole    Tablet 40 milliGRAM(s) Oral before breakfast  sertraline 100 milliGRAM(s) Oral daily    MEDICATIONS  (PRN):  acetaminophen     Tablet .. 650 milliGRAM(s) Oral every 6 hours PRN Temp greater or equal to 38C (100.4F), Mild Pain (1 - 3)  dextrose Oral Gel 15 Gram(s) Oral once PRN Blood Glucose LESS THAN 70 milliGRAM(s)/deciliter  melatonin 3 milliGRAM(s) Oral at bedtime PRN Insomnia

## 2023-09-28 NOTE — PROGRESS NOTE ADULT - PROBLEM SELECTOR PLAN 5
Known HTN, takes HCTZ 25mg qAM and Metoprolol tartrate 25mg BID at home.  - continue HCTZ and metoprolol w/ hold parameters Known HTN, takes HCTZ 25mg qAM and Metoprolol tartrate 25mg BID at home.  - continue HCTZ and metoprolol w/ hold parameters? Known HTN, on home benazepril-hydrochlorothiazide 10 mg-12.5 mg oral tablet and Metoprolol succinate 50mg daily at home.  - Metoprolol succinate 50mg daily (w/ hold parameters)  - Holding benazepril-hydrochlorothiazide 10 mg-12.5 mg oral tablet:

## 2023-09-28 NOTE — PROGRESS NOTE ADULT - PROBLEM SELECTOR PLAN 2
Known T2DM, takes Metformin 500mg BID and Insulin Basaglar 15u qd at home. States her most recent a1c has been ~12%. Also takes Gabapentin 300mg qd for neuropathy and Creon (Pancrelipase) 2 caps TID w/ meals at home. A1C 12.     - Increase lispro to 5u premeal   - lantus 15u qhs  - mISS  - continue gabapentin and pancrelipase  - Endocrine consulted, appreciate recs Known T2DM, takes Metformin 500mg BID and Insulin Basaglar 15u qd at home. States her most recent a1c has been ~12% now with A1c of 8.3  Also takes Gabapentin 300mg qd for neuropathy and Creon (Pancrelipase) 2 caps TID w/ meals at home.  - mISS  - C/w gabapentin and pancrelipase  - F/u Endocrine   - lispro to 5u premeal, lantus 15u qhs? Known T2DM, takes Metformin 500mg BID and Insulin Basaglar 15u qd at home. States her most recent a1c has been ~12% now with A1c of 8.3  Also takes Gabapentin 300mg qd for neuropathy and Creon (Pancrelipase) 2 caps TID w/ meals at home.  - mISS  - C/w gabapentin 300mg qhs  - F/u Endocrine

## 2023-09-28 NOTE — PROGRESS NOTE ADULT - PROBLEM SELECTOR PLAN 3
Hx of PAD, s/p 3 stents, with upcoming stent placement March 3 on the R leg. Most recent stent placed 2 weeks ago  -c/w plavix Hx of PAD, s/p 3 stents, with upcoming stent placement March 3 on the R leg. Most recent stent placed 2 weeks ago  - c/w Plavix  - c/w ASA

## 2023-09-28 NOTE — PROGRESS NOTE ADULT - PROBLEM SELECTOR PLAN 8
Known depression, takes Sertraline 150mg qAM and Clonazepam 0.5mg qd PRN at home.  - continue sertraline  - continue clonazepam PRN Known depression, takes Sertraline 100mg daily and Bupropion 150mg daily   - c/w sertraline 100mg daily   - c/w Bupropion 150mg daily

## 2023-09-28 NOTE — PROGRESS NOTE ADULT - ASSESSMENT
63yo F PMH CAD and MI s/p ETELVINA x2 (2012/2013), T2DM c/b peripheral neuropathy, HTN, PAD w/ b/l arterial stents, CVA (no residual deficits), mild early dementia presented w/ foul-smelling drainage from R great toe, imaging suspicious for OM, admitted for IV abx and amputation w/ podiatry. 61yo F PMH CAD and MI s/p ETELVINA x2 (2012/2013), T2DM c/b peripheral neuropathy, HTN, PAD w/ b/l arterial stents, CVA (no residual deficits), mild early dementia s/p R great toe amputation now presenting with R 3rd toe OM pending amputation with podiatry.

## 2023-09-28 NOTE — PROGRESS NOTE ADULT - PROBLEM SELECTOR PLAN 6
Known GERD, takes Omeprazole 20mg qAM at home.  - pantoprazole 40mg qAM inpatient therapeutic interchange Known GERD, takes Omeprazole 20mg qAM at home.  - c/w pantoprazole 40mg qAM inpatient therapeutic interchange

## 2023-09-28 NOTE — PROGRESS NOTE ADULT - SUBJECTIVE AND OBJECTIVE BOX
Patient is a 63y old  Female who presents with a chief complaint of Toe wound possible OM (28 Sep 2023 06:54)      INTERVAL HPI/ OVERNIGHT EVENTS Pt was evaluated bedside. Dressings were c/d/i. Pt states that she feels a diffuse body pain which is making her feel uncomfortable       LABS                        11.8   10.47 )-----------( 370      ( 28 Sep 2023 08:03 )             37.4     09-28    140  |  105  |  24<H>  ----------------------------<  145<H>  4.1   |  25  |  0.82    Ca    9.6      28 Sep 2023 08:03  Phos  4.0     09-28  Mg     1.9     09-28    TPro  7.2  /  Alb  3.4  /  TBili  0.3  /  DBili  x   /  AST  9<L>  /  ALT  9<L>  /  AlkPhos  116  09-28    PT/INR - ( 27 Sep 2023 16:03 )   PT: 10.8 sec;   INR: 0.95          PTT - ( 27 Sep 2023 16:03 )  PTT:29.1 sec  ESR: 87  CRP: --  09-27 @ 16:03    ICU Vital Signs Last 24 Hrs  T(C): 36.9 (28 Sep 2023 05:51), Max: 37.1 (27 Sep 2023 21:34)  T(F): 98.5 (28 Sep 2023 05:51), Max: 98.7 (27 Sep 2023 21:34)  HR: 92 (28 Sep 2023 05:51) (92 - 104)  BP: 97/64 (28 Sep 2023 05:51) (97/64 - 126/81)  BP(mean): --  ABP: --  ABP(mean): --  RR: 18 (28 Sep 2023 05:51) (16 - 18)  SpO2: 88% (28 Sep 2023 05:51) (88% - 96%)    O2 Parameters below as of 28 Sep 2023 05:51  Patient On (Oxygen Delivery Method): room air        RADIOLOGY  < from: MR Foot No Cont, Right (09.28.23 @ 09:33) >    IMPRESSION:Soft tissue ulceration at the distal aspect of the third toe   with osteomyelitis within the middle and distal phalanges.    Signal abnormality within the distal aspect of what remains of the first   distal phalanx that, although nonspecific, does not appear to represent   osteomyelitis and is more likely reactive postsurgical in nature.   Clinical examination of the great toe is recommended to evaluate for the   presence of an ulcer.    < end of copied text >    < from: Xray Foot AP + Lateral + Oblique, Right (09.27.23 @ 18:15) >  IMPRESSION:  1.  Soft tissue wound is present along the 3rd distal phalanx tuft with   underlyingerosive change of the tuft consistent with osteomyelitis.  2.  Faint indistinctness and cortical irregularity of the amputated   margin of the 1st proximal phalanx that may be chronic. Correlation is   suggested for signs of infection in this location.    --- End of Report ---    < end of copied text >    MICROBIOLOGY  Culture - Blood (09.27.23 @ 17:09)    Specimen Source: .Blood Blood   Culture Results:   No growth at 12 hours      PHYSICAL EXAM  Lower Extremity Focused  Vasc:  Culture - Tissue with Gram Stain (02.22.23 @ 19:35)    Gram Stain:   No organisms seen  No WBC's seen.   Specimen Source: .Tissue right first toe clean margin or spec   Culture Results:   No growth    Lower Extremity Focused:  Vasc: right DP 1/4, PT biphasic on doppler, left DP/PT 2/4,  Derm: R great toe amp well healed, new open wound to R 3rd digit tip with surrounding hyperkeratotic tissue. Negative for malodor, serosanguinous drainage, -PTB, is too superficial for culture.   Neuro: Protective sensation is absent  MSK: 5/5 muscle strength in all compartments, has tenderness on palpation to R 3rd distal digit.

## 2023-09-28 NOTE — PROGRESS NOTE ADULT - ASSESSMENT
62F PMH CAD w/ stents, DM, HTN, PAD w/ left LE arterial stent, CVA (no residual), MI x 2 (most recent 2013), peripheral neuropathy, mild early dementia, recent R hallux amputation p/w right 3rd digit wound going on for a few weeks. Podiatry consulted for evaluation of wound, r/o gas, ro OM. WBC elevated to 12.67, ESR and CRP pending at time of initial ED consult. XR wet read showing cortical erosion and periosteal reaction at R 3rd digit concerning for OM at this time. MRI shows OM within the middle and distal phalanges of third toe.      Plan:  - C/w broad spectrum IV ABX (vanc/zosyn)  - XR and MRI results reviewed   - Local wound care: saline wet to dry gauze, kerlix, secured with tape  - Pt can be WBAT to R foot  - rest of care per primary team    Podiatry following. Plan d/w attending.

## 2023-09-28 NOTE — PROGRESS NOTE ADULT - PROBLEM SELECTOR PLAN 9
F: none  E: replenish PRN  N: dash/tlc + cc  GI ppx: pantoprazole 40mg qAM  DVT ppx: lovenox 40mg q24h  Dispo: [facility]

## 2023-09-29 ENCOUNTER — TRANSCRIPTION ENCOUNTER (OUTPATIENT)
Age: 63
End: 2023-09-29

## 2023-09-29 LAB
ALBUMIN SERPL ELPH-MCNC: 3.5 G/DL — SIGNIFICANT CHANGE UP (ref 3.3–5)
ALP SERPL-CCNC: 110 U/L — SIGNIFICANT CHANGE UP (ref 40–120)
ALT FLD-CCNC: 10 U/L — SIGNIFICANT CHANGE UP (ref 10–45)
ANION GAP SERPL CALC-SCNC: 9 MMOL/L — SIGNIFICANT CHANGE UP (ref 5–17)
APTT BLD: 29 SEC — SIGNIFICANT CHANGE UP (ref 24.5–35.6)
AST SERPL-CCNC: 13 U/L — SIGNIFICANT CHANGE UP (ref 10–40)
BASOPHILS # BLD AUTO: 0.07 K/UL — SIGNIFICANT CHANGE UP (ref 0–0.2)
BASOPHILS NFR BLD AUTO: 0.7 % — SIGNIFICANT CHANGE UP (ref 0–2)
BILIRUB SERPL-MCNC: 0.4 MG/DL — SIGNIFICANT CHANGE UP (ref 0.2–1.2)
BLD GP AB SCN SERPL QL: NEGATIVE — SIGNIFICANT CHANGE UP
BUN SERPL-MCNC: 23 MG/DL — SIGNIFICANT CHANGE UP (ref 7–23)
CALCIUM SERPL-MCNC: 9.3 MG/DL — SIGNIFICANT CHANGE UP (ref 8.4–10.5)
CHLORIDE SERPL-SCNC: 103 MMOL/L — SIGNIFICANT CHANGE UP (ref 96–108)
CO2 SERPL-SCNC: 26 MMOL/L — SIGNIFICANT CHANGE UP (ref 22–31)
CREAT SERPL-MCNC: 1.01 MG/DL — SIGNIFICANT CHANGE UP (ref 0.5–1.3)
EGFR: 63 ML/MIN/1.73M2 — SIGNIFICANT CHANGE UP
EOSINOPHIL # BLD AUTO: 0.3 K/UL — SIGNIFICANT CHANGE UP (ref 0–0.5)
EOSINOPHIL NFR BLD AUTO: 3.1 % — SIGNIFICANT CHANGE UP (ref 0–6)
GLUCOSE BLDC GLUCOMTR-MCNC: 150 MG/DL — HIGH (ref 70–99)
GLUCOSE BLDC GLUCOMTR-MCNC: 168 MG/DL — HIGH (ref 70–99)
GLUCOSE BLDC GLUCOMTR-MCNC: 169 MG/DL — HIGH (ref 70–99)
GLUCOSE BLDC GLUCOMTR-MCNC: 198 MG/DL — HIGH (ref 70–99)
GLUCOSE SERPL-MCNC: 104 MG/DL — HIGH (ref 70–99)
HCT VFR BLD CALC: 36.1 % — SIGNIFICANT CHANGE UP (ref 34.5–45)
HGB BLD-MCNC: 11.6 G/DL — SIGNIFICANT CHANGE UP (ref 11.5–15.5)
IMM GRANULOCYTES NFR BLD AUTO: 0.4 % — SIGNIFICANT CHANGE UP (ref 0–0.9)
INR BLD: 0.95 — SIGNIFICANT CHANGE UP (ref 0.85–1.18)
LYMPHOCYTES # BLD AUTO: 3.22 K/UL — SIGNIFICANT CHANGE UP (ref 1–3.3)
LYMPHOCYTES # BLD AUTO: 33.3 % — SIGNIFICANT CHANGE UP (ref 13–44)
MAGNESIUM SERPL-MCNC: 2 MG/DL — SIGNIFICANT CHANGE UP (ref 1.6–2.6)
MCHC RBC-ENTMCNC: 25.7 PG — LOW (ref 27–34)
MCHC RBC-ENTMCNC: 32.1 GM/DL — SIGNIFICANT CHANGE UP (ref 32–36)
MCV RBC AUTO: 80 FL — SIGNIFICANT CHANGE UP (ref 80–100)
MONOCYTES # BLD AUTO: 0.65 K/UL — SIGNIFICANT CHANGE UP (ref 0–0.9)
MONOCYTES NFR BLD AUTO: 6.7 % — SIGNIFICANT CHANGE UP (ref 2–14)
NEUTROPHILS # BLD AUTO: 5.39 K/UL — SIGNIFICANT CHANGE UP (ref 1.8–7.4)
NEUTROPHILS NFR BLD AUTO: 55.8 % — SIGNIFICANT CHANGE UP (ref 43–77)
NRBC # BLD: 0 /100 WBCS — SIGNIFICANT CHANGE UP (ref 0–0)
PHOSPHATE SERPL-MCNC: 3.4 MG/DL — SIGNIFICANT CHANGE UP (ref 2.5–4.5)
PLATELET # BLD AUTO: 364 K/UL — SIGNIFICANT CHANGE UP (ref 150–400)
POTASSIUM SERPL-MCNC: 3.9 MMOL/L — SIGNIFICANT CHANGE UP (ref 3.5–5.3)
POTASSIUM SERPL-SCNC: 3.9 MMOL/L — SIGNIFICANT CHANGE UP (ref 3.5–5.3)
PROT SERPL-MCNC: 7.3 G/DL — SIGNIFICANT CHANGE UP (ref 6–8.3)
PROTHROM AB SERPL-ACNC: 10.9 SEC — SIGNIFICANT CHANGE UP (ref 9.5–13)
RBC # BLD: 4.51 M/UL — SIGNIFICANT CHANGE UP (ref 3.8–5.2)
RBC # FLD: 17 % — HIGH (ref 10.3–14.5)
RH IG SCN BLD-IMP: POSITIVE — SIGNIFICANT CHANGE UP
SARS-COV-2 RNA SPEC QL NAA+PROBE: NEGATIVE — SIGNIFICANT CHANGE UP
SODIUM SERPL-SCNC: 138 MMOL/L — SIGNIFICANT CHANGE UP (ref 135–145)
VANCOMYCIN TROUGH SERPL-MCNC: 23.8 UG/ML — HIGH (ref 10–20)
WBC # BLD: 9.67 K/UL — SIGNIFICANT CHANGE UP (ref 3.8–10.5)
WBC # FLD AUTO: 9.67 K/UL — SIGNIFICANT CHANGE UP (ref 3.8–10.5)

## 2023-09-29 PROCEDURE — 71045 X-RAY EXAM CHEST 1 VIEW: CPT | Mod: 26

## 2023-09-29 PROCEDURE — 99233 SBSQ HOSP IP/OBS HIGH 50: CPT | Mod: GC

## 2023-09-29 RX ORDER — VANCOMYCIN HCL 1 G
1000 VIAL (EA) INTRAVENOUS EVERY 12 HOURS
Refills: 0 | Status: COMPLETED | OUTPATIENT
Start: 2023-09-30 | End: 2023-09-30

## 2023-09-29 RX ORDER — ONDANSETRON 8 MG/1
4 TABLET, FILM COATED ORAL ONCE
Refills: 0 | Status: COMPLETED | OUTPATIENT
Start: 2023-09-29 | End: 2023-09-29

## 2023-09-29 RX ORDER — NICOTINE POLACRILEX 2 MG
4 GUM BUCCAL EVERY 4 HOURS
Refills: 0 | Status: DISCONTINUED | OUTPATIENT
Start: 2023-09-29 | End: 2023-10-01

## 2023-09-29 RX ADMIN — Medication 50 MILLIGRAM(S): at 07:11

## 2023-09-29 RX ADMIN — INSULIN GLARGINE 15 UNIT(S): 100 INJECTION, SOLUTION SUBCUTANEOUS at 22:59

## 2023-09-29 RX ADMIN — SERTRALINE 100 MILLIGRAM(S): 25 TABLET, FILM COATED ORAL at 12:03

## 2023-09-29 RX ADMIN — Medication 81 MILLIGRAM(S): at 12:02

## 2023-09-29 RX ADMIN — BUPROPION HYDROCHLORIDE 150 MILLIGRAM(S): 150 TABLET, EXTENDED RELEASE ORAL at 12:02

## 2023-09-29 RX ADMIN — Medication 3 MILLILITER(S): at 04:21

## 2023-09-29 RX ADMIN — ENOXAPARIN SODIUM 40 MILLIGRAM(S): 100 INJECTION SUBCUTANEOUS at 12:02

## 2023-09-29 RX ADMIN — ONDANSETRON 4 MILLIGRAM(S): 8 TABLET, FILM COATED ORAL at 13:51

## 2023-09-29 RX ADMIN — Medication 2: at 13:40

## 2023-09-29 RX ADMIN — Medication 3 MILLILITER(S): at 23:01

## 2023-09-29 RX ADMIN — Medication 3 MILLILITER(S): at 16:18

## 2023-09-29 RX ADMIN — ATORVASTATIN CALCIUM 80 MILLIGRAM(S): 80 TABLET, FILM COATED ORAL at 23:01

## 2023-09-29 RX ADMIN — Medication 300 MILLIGRAM(S): at 07:50

## 2023-09-29 RX ADMIN — Medication 3 MILLILITER(S): at 10:36

## 2023-09-29 RX ADMIN — CLOPIDOGREL BISULFATE 75 MILLIGRAM(S): 75 TABLET, FILM COATED ORAL at 12:02

## 2023-09-29 RX ADMIN — PIPERACILLIN AND TAZOBACTAM 25 GRAM(S): 4; .5 INJECTION, POWDER, LYOPHILIZED, FOR SOLUTION INTRAVENOUS at 17:29

## 2023-09-29 RX ADMIN — Medication 4 MILLIGRAM(S): at 14:00

## 2023-09-29 RX ADMIN — ENOXAPARIN SODIUM 40 MILLIGRAM(S): 100 INJECTION SUBCUTANEOUS at 23:00

## 2023-09-29 RX ADMIN — PANTOPRAZOLE SODIUM 40 MILLIGRAM(S): 20 TABLET, DELAYED RELEASE ORAL at 07:11

## 2023-09-29 RX ADMIN — Medication 2: at 09:41

## 2023-09-29 RX ADMIN — Medication 2: at 23:02

## 2023-09-29 RX ADMIN — GABAPENTIN 300 MILLIGRAM(S): 400 CAPSULE ORAL at 07:11

## 2023-09-29 RX ADMIN — PIPERACILLIN AND TAZOBACTAM 25 GRAM(S): 4; .5 INJECTION, POWDER, LYOPHILIZED, FOR SOLUTION INTRAVENOUS at 00:55

## 2023-09-29 RX ADMIN — GABAPENTIN 300 MILLIGRAM(S): 400 CAPSULE ORAL at 17:29

## 2023-09-29 RX ADMIN — PIPERACILLIN AND TAZOBACTAM 25 GRAM(S): 4; .5 INJECTION, POWDER, LYOPHILIZED, FOR SOLUTION INTRAVENOUS at 10:35

## 2023-09-29 NOTE — PROGRESS NOTE ADULT - PROBLEM SELECTOR PLAN 1
MRI, which will be used to determine level of amputation. MRI R foot showed osteomyelitis within the middle and distal phalanges.  - f/u podiatry   - f/u wound/blood cultures   - c/w vanc 1.5g q12h  - c/w Zosyn 3.375g q8h MRI, which will be used to determine level of amputation. MRI R foot showed osteomyelitis within the middle and distal phalanges of R 3rd toe.   - NPO at mn for possible amputation   - f/u podiatry   - f/u wound/blood cultures   - c/w vanc 1.5g q12h  - c/w Zosyn 3.375g q8h

## 2023-09-29 NOTE — PROGRESS NOTE ADULT - SUBJECTIVE AND OBJECTIVE BOX
Patient is a 63y old  Female who presents with a chief complaint of Toe wound possible OM (29 Sep 2023 06:19)      INTERVAL HPI/ OVERNIGHT EVENTS Pt was evaluated bedside with the attending. Informed consent was obtained. Dressings were C/D/I. No other pedal complaints at this time.       LABS                        11.6   9.67  )-----------( 364      ( 29 Sep 2023 06:10 )             36.1     09-29    138  |  103  |  23  ----------------------------<  104<H>  3.9   |  26  |  1.01    Ca    9.3      29 Sep 2023 06:10  Phos  3.4     09-29  Mg     2.0     09-29    TPro  7.3  /  Alb  3.5  /  TBili  0.4  /  DBili  x   /  AST  13  /  ALT  10  /  AlkPhos  110  09-29    PT/INR - ( 29 Sep 2023 06:10 )   PT: 10.9 sec;   INR: 0.95          PTT - ( 29 Sep 2023 06:10 )  PTT:29.0 sec    ICU Vital Signs Last 24 Hrs  T(C): 36.8 (29 Sep 2023 12:10), Max: 37.1 (28 Sep 2023 20:25)  T(F): 98.3 (29 Sep 2023 12:10), Max: 98.8 (28 Sep 2023 20:25)  HR: 74 (29 Sep 2023 12:10) (74 - 88)  BP: 121/79 (29 Sep 2023 12:10) (105/66 - 134/84)  BP(mean): --  ABP: --  ABP(mean): --  RR: 19 (29 Sep 2023 12:10) (18 - 19)  SpO2: 94% (29 Sep 2023 12:10) (94% - 97%)    O2 Parameters below as of 29 Sep 2023 12:10  Patient On (Oxygen Delivery Method): room air            RADIOLOGY  < from: MR Foot No Cont, Right (09.28.23 @ 09:33) >  IMPRESSION:Soft tissue ulceration at the distal aspect of the third toe   with osteomyelitis within the middle and distal phalanges.    Signal abnormality within the distal aspect of what remains of the first   distal phalanx that, although nonspecific, does not appear to represent   osteomyelitis and is more likely reactive postsurgical in nature.   Clinical examination of the great toe is recommended to evaluate for the   presence of an ulcer.    --- End of Report ---    < end of copied text >    < from: Xray Foot AP + Lateral + Oblique, Right (09.27.23 @ 18:15) >  IMPRESSION:  1.  Soft tissue wound is present along the 3rd distal phalanx tuft with   underlyingerosive change of the tuft consistent with osteomyelitis.  2.  Faint indistinctness and cortical irregularity of the amputated   margin of the 1st proximal phalanx that may be chronic. Correlation is   suggested for signs of infection in this location.    --- End of Report ---      < end of copied text >    MICROBIOLOGY  Culture - Blood (09.27.23 @ 17:09)    Specimen Source: .Blood Blood   Culture Results:   No growth at 1 day.    Culture - Blood (09.27.23 @ 17:09)    Specimen Source: .Blood Blood   Culture Results:   No growth at 1 day.    Culture - Tissue with Gram Stain (02.22.23 @ 19:35)    Gram Stain:   No organisms seen  No WBC's seen.   Specimen Source: .Tissue right first toe clean margin or spec   Culture Results:   No growth      PHYSICAL EXAM  Lower Extremity Focused  Vasc: right DP 1/4, PT biphasic on doppler, left DP/PT 2/4,  Derm: R great toe amp well healed, new open wound to R 3rd digit tip with surrounding hyperkeratotic tissue. Negative for malodor, serosanguinous drainage, -PTB, is too superficial for culture.   Neuro: Protective sensation is absent  MSK: 5/5 muscle strength in all compartments, has tenderness on palpation to R 3rd distal digit.

## 2023-09-29 NOTE — PROGRESS NOTE ADULT - PROBLEM SELECTOR PLAN 5
Known HTN, on home benazepril-hydrochlorothiazide 10 mg-12.5 mg oral tablet and Metoprolol succinate 50mg daily at home.  - Metoprolol succinate 50mg daily (w/ hold parameters)  - Holding benazepril-hydrochlorothiazide 10 mg-12.5 mg oral tablet:

## 2023-09-29 NOTE — PROGRESS NOTE ADULT - PROBLEM SELECTOR PLAN 8
Known depression, takes Sertraline 100mg daily and Bupropion 150mg daily   - c/w sertraline 100mg daily   - c/w Bupropion 150mg daily

## 2023-09-29 NOTE — DISCHARGE NOTE PROVIDER - NSDCCPCAREPLAN_GEN_ALL_CORE_FT
PRINCIPAL DISCHARGE DIAGNOSIS  Diagnosis: Acute osteomyelitis  Assessment and Plan of Treatment: You were found to have an infection of the bone of R 3rd toe, also called osteomyelitis. You underwent an amputation of the R 3rd partial ray amputation vs R 3rd digit amputation to prevent further spreadal of the disease. Please continue with your prescribed abx Be sure to follow up with your podiatrist.   Treatment:  Surgical site dressed with adaptic, gauze, kerlix, and ACE.    - 10 day abx course for SSTI (Bactrim/Keflex, 10/1-10/10)  - Keflex 1 tablet 2x a day (morning and night)   - Bactric 1 tablet 2x a day  (mornign and night)   -Can heel WBAT to R foot in a surgical shoe  Discharge recommendations:   - Patient should follow up with Dr. Alvaro Robin within 1 week of discharge.    Office information:          [facility] Address- [address] [place] [address]. [address], [place] [address] Phone: [phone]  - Please leave dressing in tact until your follow up appointment with Dr. Robin  - Follow up with physical therapy         SECONDARY DISCHARGE DIAGNOSES  Diagnosis: Diabetic toe ulcer  Assessment and Plan of Treatment: Diabetic Foot Ulcers  WHAT YOU NEED TO KNOW:  A diabetic foot ulcer can be redness over a bony area or an open sore. The ulcer can develop anywhere on your foot or toes. Ulcers usually develop on the bottom of the foot. You may not know you have an ulcer until you notice drainage on your sock. Drainage is fluid that may be yellow, brown, or red. The fluid may also contain pus or blood. Foot Ulcers  DISCHARGE INSTRUCTIONS:  Call your local emergency number (911 in the [place]) if:   You have a fever with chills.  You begin vomiting.  You feel faint or become confused.  Call your doctor if:   You see new drainage on your sock.  Your foot becomes red, warm, and swollen.   Your foot ulcer has a bad smell or is draining pus.   You feel pain in a foot that used to have little or no feeling.   You see black or dead tissue in or around your ulcer.   Your ulcer becomes bigger, deeper, or does not heal.   You have questions or concerns about your condition or care.   Medicines:   Antibiotics may be given to help treat or prevent a bacterial infection.  Take your medicine as directed. Contact your healthcare provider if you think your medicine is not helping or if you have side effects. Tell him or her if you are allergic to any medicine. Keep a list of the medicines, vitamins, and herbs you take. Include the amounts, and when and why you take them. Bring the list or the pill bottles to follow-up visits. Carry your medicine list with you in case of an emergency.  Care for your wound as directed: A bandage will be put on your ulcer. Your healthcare provider will give you instructions on changing your bandage. You may need to clean the wound and change the bandage daily. The bandage may contain medicines to help your ulcer heal. You may be asked to put medicine on your foot ulcer before putting on the bandage. The medicine may also prevent growth of tissue that is not healthy. You may need to cover your wound with a plastic bag while you bathe. Ask your healthcare provider for instr

## 2023-09-29 NOTE — PHYSICAL THERAPY INITIAL EVALUATION ADULT - GAIT DEVIATIONS NOTED, PT EVAL
decreased arnoldo/increased time in double stance/decreased stride length/decreased weight-shifting ability

## 2023-09-29 NOTE — DISCHARGE NOTE PROVIDER - CARE PROVIDER_API CALL
Alvaro Robin  Podiatric Medicine  [address], [address]  [place] [address]  Phone: [phone]  Fax: [phone]  Follow Up Time: 1 week

## 2023-09-29 NOTE — PHYSICAL THERAPY INITIAL EVALUATION ADULT - ADDITIONAL COMMENTS
pt states that she lives w/ her family in an elevator access apt building w/ no stairs to enter. Denies use of DME for ambulation prior to this admission. Denies hx of recent falls. no home health aide

## 2023-09-29 NOTE — PROGRESS NOTE ADULT - PROBLEM SELECTOR PLAN 2
Known T2DM, takes Metformin 500mg BID and Insulin Basaglar 15u qd at home. States her most recent a1c has been ~12% now with A1c of 8.3  Also takes Gabapentin 300mg qd for neuropathy and Creon (Pancrelipase) 2 caps TID w/ meals at home.  - mISS  - C/w gabapentin 300mg qhs  - F/u Endocrine

## 2023-09-29 NOTE — PROGRESS NOTE ADULT - ATTENDING COMMENTS
62-year-old female with a PMHx of CAD (s/p PCI, on DAPT), DMII (A1c 8.3%), HTN, PAD (s/p stents), CVA, COPD, sciatica and 1st great toe OM (s/p amputation) who presented with right 3rd toe osteomyelitis.      #Sepsis 2/2 Right 3rd Toe Wound     -podiatry consulted, plan for amputation over the weekend   -continue with empiric Vancomycin and Zosyn for now, de-escalate as able based on culture data and clinical response   -BCx (9/27): NGTD     #CAD   -continue with DAPT for now, clarify with podiatry if needs to be held prior to amputation      #DMII    -home regimen: Basaglar 15 units qhs and Metformin 500mg BID   -continue with Lantus 8 units qhs and ISS     DVT PPx: Lovenox     Dispo: pending OR
62-year-old female with a PMHx of CAD (s/p PCI, on DAPT), DMII (A1c 8.3%), HTN, PAD (s/p stents), CVA, COPD, sciatica and 1st great toe OM (s/p amputation) who presented with right 3rd toe osteomyelitis.     #Sepsis 2/2 Right 3rd Toe Wound    -podiatry consulted, follow up formal recommendations now that osteomyelitis is confirmed on MRI   -continue with empiric Vancomycin and Zosyn for now, de-escalate as able based on culture data  -BCx (9/27): NGTD    #CAD  -continue with DAPT for now, clarify with podiatry if needs to be held prior to amputation     #DMII   -home regimen: Basaglar 15 units qhs and Metformin 500mg BID  -continue with Lantus 8 units qhs and ISS

## 2023-09-29 NOTE — DISCHARGE NOTE PROVIDER - NSDCMRMEDTOKEN_GEN_ALL_CORE_FT
aspirin 81 mg oral tablet: 1 tab(s) orally once a day  benazepril-hydrochlorothiazide 10 mg-12.5 mg oral tablet: 1 tab(s) orally once a day  buPROPion 150 mg/12 hours (SR) oral tablet, extended release: 1 tab(s) orally once a day  clopidogrel 75 mg oral tablet: 1 tab(s) orally every 24 hours  gabapentin 300 mg oral tablet: 1 tab(s) orally once a day  insulin glargine 100 units/mL subcutaneous solution: 1 subcutaneous once a day  metoprolol succinate 50 mg oral capsule, extended release: 1 cap(s) orally once a day  Ozempic 2 mg/1.5 mL (0.25 mg or 0.5 mg dose) subcutaneous solution: 0.25 milligram(s) subcutaneously once a week   pantoprazole 40 mg oral delayed release tablet: 1 tab(s) orally once a day  rosuvastatin 20 mg oral tablet: 1 tab(s) orally once a day  sertraline 100 mg oral tablet: 1 tab(s) orally once a day  Synjardy 12.5 mg-1000 mg oral tablet: 1 tab(s) orally once a day   aspirin 81 mg oral tablet: 1 tab(s) orally once a day  benazepril-hydrochlorothiazide 10 mg-12.5 mg oral tablet: 1 tab(s) orally once a day  buPROPion 150 mg/12 hours (SR) oral tablet, extended release: 1 tab(s) orally once a day  cephalexin 500 mg oral capsule: 1 cap(s) orally every 12 hours  clopidogrel 75 mg oral tablet: 1 tab(s) orally every 24 hours  gabapentin 300 mg oral tablet: 1 tab(s) orally once a day  insulin glargine 100 units/mL subcutaneous solution: 1 subcutaneous once a day  metoprolol succinate 50 mg oral capsule, extended release: 1 cap(s) orally once a day  Ozempic 2 mg/1.5 mL (0.25 mg or 0.5 mg dose) subcutaneous solution: 0.25 milligram(s) subcutaneously once a week   pantoprazole 40 mg oral delayed release tablet: 1 tab(s) orally once a day  rosuvastatin 20 mg oral tablet: 1 tab(s) orally once a day  sertraline 100 mg oral tablet: 1 tab(s) orally once a day  sulfamethoxazole-trimethoprim 800 mg-160 mg oral tablet: 1 tab(s) orally every 12 hours  Synjardy 12.5 mg-1000 mg oral tablet: 1 tab(s) orally once a day

## 2023-09-29 NOTE — DISCHARGE NOTE PROVIDER - NSDCFUADDAPPT_GEN_ALL_CORE_FT
Patient should follow up with Dr. Alvaro Robin within 1 week of discharge.    Office information:          [place] Address- [address], [place] [address] Phone: [phone]  - Please leave dressing in tact until your follow up appointment with Dr. Robin

## 2023-09-29 NOTE — PROGRESS NOTE ADULT - SUBJECTIVE AND OBJECTIVE BOX
OVERNIGHT EVENTS: RITA    SUBJECTIVE:  Patient seen and examined at bedside.    Vital Signs Last 12 Hrs  T(F): 98.8 (09-29-23 @ 05:38), Max: 98.8 (09-28-23 @ 20:25)  HR: 80 (09-29-23 @ 05:38) (80 - 88)  BP: 134/77 (09-29-23 @ 05:38) (120/72 - 134/84)  BP(mean): --  RR: 18 (09-29-23 @ 05:38) (18 - 18)  SpO2: 94% (09-29-23 @ 05:38) (94% - 97%)  I&O's Summary      PHYSICAL EXAM:  Constitutional: NAD, comfortable in bed.  HEENT: NC/AT, PERRLA, EOMI, no conjunctival pallor or scleral icterus, MMM  Neck: Supple, no JVD  Respiratory: CTA B/L. No w/r/r.   Cardiovascular: RRR, normal S1 and S2, no m/r/g.   Gastrointestinal: +BS, soft NTND, no guarding or rebound tenderness, no palpable masses   Extremities: wwp; no cyanosis, clubbing or edema.   Vascular: Pulses equal and strong throughout.   Neurological: AAOx3, no CN deficits, strength and sensation intact throughout.   Skin: No gross skin abnormalities or rashes        LABS:                        11.6   9.67  )-----------( 364      ( 29 Sep 2023 06:10 )             36.1     09-28    140  |  105  |  24<H>  ----------------------------<  145<H>  4.1   |  25  |  0.82    Ca    9.6      28 Sep 2023 08:03  Phos  4.0     09-28  Mg     1.9     09-28    TPro  7.2  /  Alb  3.4  /  TBili  0.3  /  DBili  x   /  AST  9<L>  /  ALT  9<L>  /  AlkPhos  116  09-28    PT/INR - ( 27 Sep 2023 16:03 )   PT: 10.8 sec;   INR: 0.95          PTT - ( 27 Sep 2023 16:03 )  PTT:29.1 sec  Urinalysis Basic - ( 28 Sep 2023 08:03 )    Color: x / Appearance: x / SG: x / pH: x  Gluc: 145 mg/dL / Ketone: x  / Bili: x / Urobili: x   Blood: x / Protein: x / Nitrite: x   Leuk Esterase: x / RBC: x / WBC x   Sq Epi: x / Non Sq Epi: x / Bacteria: x          RADIOLOGY & ADDITIONAL TESTS:    MEDICATIONS  (STANDING):  albuterol/ipratropium for Nebulization 3 milliLiter(s) Nebulizer every 6 hours  aspirin  chewable 81 milliGRAM(s) Oral daily  atorvastatin 80 milliGRAM(s) Oral at bedtime  buPROPion XL (24-Hour) . 150 milliGRAM(s) Oral daily  clopidogrel Tablet 75 milliGRAM(s) Oral daily  dextrose 5%. 1000 milliLiter(s) (50 mL/Hr) IV Continuous <Continuous>  dextrose 5%. 1000 milliLiter(s) (100 mL/Hr) IV Continuous <Continuous>  dextrose 50% Injectable 25 Gram(s) IV Push once  dextrose 50% Injectable 12.5 Gram(s) IV Push once  enoxaparin Injectable 40 milliGRAM(s) SubCutaneous every 12 hours  gabapentin 300 milliGRAM(s) Oral every 12 hours  glucagon  Injectable 1 milliGRAM(s) IntraMuscular once  influenza   Vaccine 0.5 milliLiter(s) IntraMuscular once  insulin glargine Injectable (LANTUS) 15 Unit(s) SubCutaneous at bedtime  insulin lispro (ADMELOG) corrective regimen sliding scale   SubCutaneous Before meals and at bedtime  metoprolol succinate ER 50 milliGRAM(s) Oral daily  pantoprazole    Tablet 40 milliGRAM(s) Oral before breakfast  piperacillin/tazobactam IVPB.. 3.375 Gram(s) IV Intermittent every 8 hours  sertraline 100 milliGRAM(s) Oral daily  vancomycin  IVPB 1500 milliGRAM(s) IV Intermittent every 12 hours    MEDICATIONS  (PRN):  acetaminophen     Tablet .. 650 milliGRAM(s) Oral every 6 hours PRN Temp greater or equal to 38C (100.4F), Mild Pain (1 - 3)  dextrose Oral Gel 15 Gram(s) Oral once PRN Blood Glucose LESS THAN 70 milliGRAM(s)/deciliter  melatonin 3 milliGRAM(s) Oral at bedtime PRN Insomnia   OVERNIGHT EVENTS: RITA    SUBJECTIVE:  Patient seen and examined at bedside, pt states that she is feeling well, she notes that she has been having left shoulder pain for the past few months but no acute change here. Additionally she states that she has been treated in the past for an abscess above her eye. She will try and gather more information regarding that today. Pt denies cp, n/v/d, sob, dysuria, fever chills.      Vital Signs Last 12 Hrs  T(F): 98.8 (09-29-23 @ 05:38), Max: 98.8 (09-28-23 @ 20:25)  HR: 80 (09-29-23 @ 05:38) (80 - 88)  BP: 134/77 (09-29-23 @ 05:38) (120/72 - 134/84)  BP(mean): --  RR: 18 (09-29-23 @ 05:38) (18 - 18)  SpO2: 94% (09-29-23 @ 05:38) (94% - 97%)  I&O's Summary      PHYSICAL EXAM:  Constitutional: NAD, comfortable in bed.  HEENT: NC/AT, no scleral icterus, MMM  Neck: Supple, no JVD  Respiratory: CTA B/L. No w/r/r.   Cardiovascular: RRR, normal S1 and S2, no m/r/g.   Gastrointestinal: +BS, soft NTND, no guarding or rebound tenderness, no palpable masses   Extremities: wwp no edema, R 3rd toe ulcer/OM.  Vascular: Pulses equal and strong throughout.   Neurological: AAOx3        LABS:                        11.6   9.67  )-----------( 364      ( 29 Sep 2023 06:10 )             36.1     09-28    140  |  105  |  24<H>  ----------------------------<  145<H>  4.1   |  25  |  0.82    Ca    9.6      28 Sep 2023 08:03  Phos  4.0     09-28  Mg     1.9     09-28    TPro  7.2  /  Alb  3.4  /  TBili  0.3  /  DBili  x   /  AST  9<L>  /  ALT  9<L>  /  AlkPhos  116  09-28    PT/INR - ( 27 Sep 2023 16:03 )   PT: 10.8 sec;   INR: 0.95          PTT - ( 27 Sep 2023 16:03 )  PTT:29.1 sec  Urinalysis Basic - ( 28 Sep 2023 08:03 )    Color: x / Appearance: x / SG: x / pH: x  Gluc: 145 mg/dL / Ketone: x  / Bili: x / Urobili: x   Blood: x / Protein: x / Nitrite: x   Leuk Esterase: x / RBC: x / WBC x   Sq Epi: x / Non Sq Epi: x / Bacteria: x          RADIOLOGY & ADDITIONAL TESTS:    MEDICATIONS  (STANDING):  albuterol/ipratropium for Nebulization 3 milliLiter(s) Nebulizer every 6 hours  aspirin  chewable 81 milliGRAM(s) Oral daily  atorvastatin 80 milliGRAM(s) Oral at bedtime  buPROPion XL (24-Hour) . 150 milliGRAM(s) Oral daily  clopidogrel Tablet 75 milliGRAM(s) Oral daily  dextrose 5%. 1000 milliLiter(s) (50 mL/Hr) IV Continuous <Continuous>  dextrose 5%. 1000 milliLiter(s) (100 mL/Hr) IV Continuous <Continuous>  dextrose 50% Injectable 25 Gram(s) IV Push once  dextrose 50% Injectable 12.5 Gram(s) IV Push once  enoxaparin Injectable 40 milliGRAM(s) SubCutaneous every 12 hours  gabapentin 300 milliGRAM(s) Oral every 12 hours  glucagon  Injectable 1 milliGRAM(s) IntraMuscular once  influenza   Vaccine 0.5 milliLiter(s) IntraMuscular once  insulin glargine Injectable (LANTUS) 15 Unit(s) SubCutaneous at bedtime  insulin lispro (ADMELOG) corrective regimen sliding scale   SubCutaneous Before meals and at bedtime  metoprolol succinate ER 50 milliGRAM(s) Oral daily  pantoprazole    Tablet 40 milliGRAM(s) Oral before breakfast  piperacillin/tazobactam IVPB.. 3.375 Gram(s) IV Intermittent every 8 hours  sertraline 100 milliGRAM(s) Oral daily  vancomycin  IVPB 1500 milliGRAM(s) IV Intermittent every 12 hours    MEDICATIONS  (PRN):  acetaminophen     Tablet .. 650 milliGRAM(s) Oral every 6 hours PRN Temp greater or equal to 38C (100.4F), Mild Pain (1 - 3)  dextrose Oral Gel 15 Gram(s) Oral once PRN Blood Glucose LESS THAN 70 milliGRAM(s)/deciliter  melatonin 3 milliGRAM(s) Oral at bedtime PRN Insomnia

## 2023-09-29 NOTE — PROGRESS NOTE ADULT - ASSESSMENT
62F PMH CAD w/ stents, DM, HTN, PAD w/ left LE arterial stent, CVA (no residual), MI x 2 (most recent 2013), peripheral neuropathy, mild early dementia, recent R hallux amputation p/w right 3rd digit wound going on for a few weeks. Podiatry consulted for evaluation of wound, r/o gas, ro OM. WBC elevated to 12.67, ESR and CRP pending at time of initial ED consult. XR wet read showing cortical erosion and periosteal reaction at R 3rd digit concerning for OM at this time. MRI shows OM within the middle and distal phalanges of third toe. Surgical add on for right partial 3rd ray resection with washout on 9/30/2023 after 1pm.       Plan:  - Plan for Right partial 3rd ray resection with washout in the OR Saturday 9/30/2023, after 1pm  - Please have patient medically optimized for surgery  - NPO after midnight  - Pre-op labs to be ordered (CBC, CMP, 2 T&S, Coags, EKG, CXR)   - Informed consent obtained and placed in chart   - C/w broad spectrum IV ABX (vanc/zosyn)  - Local wound care: saline wet to dry gauze, kerlix, secured with tape  - Pt can be WBAT to R foot  - Rest of care per primary team    Podiatry following. Plan d/w attending.

## 2023-09-29 NOTE — DISCHARGE NOTE PROVIDER - HOSPITAL COURSE
#Discharge: do not delete    HAROON MONET is a 63y Female with a past medical history of _____    Presented with _____, found to have _____    Problem List/Main Diagnoses (system-based):   #     #     #    Patient was discharged to: (home/[facility]/acute rehab/hospice, etc. and w/ home health/home PT/RN/home O2)    New medications:   Changes to old medications:  Medications that were stopped:    Items to follow up as outpatient:    Physical exam at the time of discharge:       LABS & STUDIES:  COVID-19 PCR: Negative (29 Sep 2023 13:15)   #Discharge: do not delete    HAROON MONET is a 63y Female with a past medical history of CAD and MI s/p ETELVINA x2 (2012/2013), T2DM c/b peripheral neuropathy, HTN, PAD w/ b/l arterial stents, CVA (no residual deficits), mild early dementia s/p R great toe amputation.    Presented with R 3rd toe OM now s/p amputation with podiatry.     Problem List/Main Diagnoses (system-based):   #Sepsis 2/2 Right 3rd Toe Wound (resolved)   #Osteomyelitis of Right 3rd Toe  Pt had MRI R foot showed osteomyelitis within the middle and distal phalanges of R 3rd toe. Pt was given Vancomycin dosed by trough Zosyn 3.375g q8h. Now s/p amputation with podiatry.   - f/u podiatry recs post op       #T2DM (type 2 diabetes mellitus).   Pt with PMHx of T2DM, takes Metformin 500mg BID and Insulin Basaglar 15u qd at home. A1c of 8.3  Also takes Gabapentin 300mg qd for neuropathy and Creon (Pancrelipase) 2 caps TID w/ meals at home.  - c/w home meds     Peripheral artery disease.   Hx of PAD, s/p 3 stents, most recent stent was about 1 year ago in her R leg.   - c/w home  Plavix 75mg daily and ASA 81mg daily.      #History of multiple myocardial infarctions.   Pt with PMHx of CAD and 2 prior MIs s/p ETELVINA x2. First stent 2013. Takes ASA 81mg qd, Plavix 75mg qd and Atorvastatin 40mg qhs at home.  - c/w atorvastatin 40mg qhs   - c/w ASA and plavix as above     # HTN (hypertension).   Known HTN, on home benazepril-hydrochlorothiazide 10 mg-12.5 mg oral tablet and Metoprolol succinate 50mg daily at home.  -  c/w home Metoprolol succinate 50mg daily,  benazepril-hydrochlorothiazide 10 mg-12.5 mg oral tablet:    #GERD (gastroesophageal reflux disease).   Known GERD, takes Omeprazole 20mg qAM at home.  - c/w home Omeprazole 20mg qAM    #Parkinson's disease dementia.   Known Parkinson's dementia. Takes Neudexta (Dextromethorphan/Quinidine) 20mg/10mg 1 tab BID.  - c/w Neudexta (Dextromethorphan/Quinidine) 20mg/10mg 1 tab BID    #Anxiety and depression.   Known depression, takes Sertraline 100mg daily and Bupropion 150mg daily   - c/w sertraline 100mg daily   - c/w Bupropion 150mg daily.      Patient was discharged to: (home/[facility]/acute rehab/hospice, etc. and w/ home health/home PT/RN/home O2)    New medications:   Changes to old medications:  Medications that were stopped:    Items to follow up as outpatient:    Physical exam at the time of discharge:       LABS & STUDIES:  COVID-19 PCR: Negative (29 Sep 2023 13:15)   #Discharge: do not delete    HAROON MONET is a 63y Female with a past medical history of CAD and MI s/p ETELVINA x2 (2012/2013), T2DM c/b peripheral neuropathy, HTN, PAD w/ b/l arterial stents, CVA (no residual deficits), mild early dementia s/p R great toe amputation.    Presented with R 3rd toe OM now s/p amputation with podiatry.     Problem List/Main Diagnoses (system-based):   #Sepsis 2/2 Right 3rd Toe Wound (resolved)   #Osteomyelitis of Right 3rd Toe  Pt had MRI R foot showed osteomyelitis within the middle and distal phalanges of R 3rd toe. Pt was given Vancomycin dosed by trough Zosyn 3.375g q8h. Now s/p amputation with podiatry.   -c/w antibiotics       #T2DM (type 2 diabetes mellitus).   Pt with PMHx of T2DM, takes Metformin 500mg BID and Insulin Basaglar 15u qd at home. A1c of 8.3  Also takes Gabapentin 300mg qd for neuropathy and Creon (Pancrelipase) 2 caps TID w/ meals at home.  - c/w home meds     Peripheral artery disease.   Hx of PAD, s/p 3 stents, most recent stent was about 1 year ago in her R leg.   - c/w home  Plavix 75mg daily and ASA 81mg daily.      #History of multiple myocardial infarctions.   Pt with PMHx of CAD and 2 prior MIs s/p ETELVINA x2. First stent 2013. Takes ASA 81mg qd, Plavix 75mg qd and Atorvastatin 40mg qhs at home.  - c/w atorvastatin 40mg qhs   - c/w ASA and plavix as above     # HTN (hypertension).   Known HTN, on home benazepril-hydrochlorothiazide 10 mg-12.5 mg oral tablet and Metoprolol succinate 50mg daily at home.  -  c/w home Metoprolol succinate 50mg daily,  benazepril-hydrochlorothiazide 10 mg-12.5 mg oral tablet:    #GERD (gastroesophageal reflux disease).   Known GERD, takes Omeprazole 20mg qAM at home.  - c/w home Omeprazole 20mg qAM    #Parkinson's disease dementia.   Known Parkinson's dementia. Takes Neudexta (Dextromethorphan/Quinidine) 20mg/10mg 1 tab BID.  - c/w Neudexta (Dextromethorphan/Quinidine) 20mg/10mg 1 tab BID    #Anxiety and depression.   Known depression, takes Sertraline 100mg daily and Bupropion 150mg daily   - c/w sertraline 100mg daily   - c/w Bupropion 150mg daily.      Patient was discharged to: (home with PT   New medications: Keflex and Bactrim   Changes to old medications: none   Medications that were stopped: none    Items to follow up as outpatient: Please follow up with podiatrist and PCP within 1 week of discharge     LABS & STUDIES:  COVID-19 PCR: Negative (29 Sep 2023 13:15)

## 2023-09-29 NOTE — PROGRESS NOTE ADULT - ASSESSMENT
61yo F PMH CAD and MI s/p ETELVINA x2 (2012/2013), T2DM c/b peripheral neuropathy, HTN, PAD w/ b/l arterial stents, CVA (no residual deficits), mild early dementia s/p R great toe amputation now presenting with R 3rd toe OM pending amputation with podiatry. 63yo F PMH CAD and MI s/p ETELVINA x2 (2012/2013), T2DM c/b peripheral neuropathy, HTN, PAD w/ b/l arterial stents, CVA (no residual deficits), mild early dementia s/p R great toe amputation now presenting with R 3rd toe OM pending amputation with podiatry over the weekend.

## 2023-09-29 NOTE — PROGRESS NOTE ADULT - PROBLEM SELECTOR PLAN 3
Hx of PAD, s/p 3 stents, with upcoming stent placement March 3 on the R leg. Most recent stent placed 2 weeks ago  - c/w Plavix  - c/w ASA Hx of PAD, s/p 3 stents, with upcoming stent placement March 3 on the R leg. Most recent stent placed 2 weeks ago  - f/u with podiatry regarding holding preop.   - c/w Plavix  - c/w ASA

## 2023-09-29 NOTE — PHYSICAL THERAPY INITIAL EVALUATION ADULT - PERTINENT HX OF CURRENT PROBLEM, REHAB EVAL
62F PMH CAD w/ stents, DM, HTN, PAD w/ left LE arterial stent, CVA (no residual), MI x 2 (most recent 2013), peripheral neuropathy, mild early dementia, recent R hallux amputation p/w right 3rd digit wound going on for a few weeks. Podiatry consulted for evaluation of wound, r/o gas, ro OM. WBC elevated to 12.67, ESR and CRP pending at time of initial ED consult. XR wet read showing cortical erosion and periosteal reaction at R 3rd digit concerning for OM at this time. MRI shows OM within the middle and distal phalanges of third toe. Surgical add on for right partial 3rd ray resection with washout on 9/30/2023 after 1pm.

## 2023-09-29 NOTE — PROGRESS NOTE ADULT - PROBLEM SELECTOR PLAN 9
F: none  E: replenish PRN  N: dash/tlc + cc  GI ppx: pantoprazole 40mg qAM  DVT ppx: lovenox 40mg q24h  Dispo: [facility], [place]

## 2023-09-29 NOTE — PROGRESS NOTE ADULT - PROBLEM SELECTOR PLAN 6
Known GERD, takes Omeprazole 20mg qAM at home.  - c/w pantoprazole 40mg qAM inpatient therapeutic interchange

## 2023-09-29 NOTE — DISCHARGE NOTE PROVIDER - NSDCCPTREATMENT_GEN_ALL_CORE_FT
PRINCIPAL PROCEDURE  Procedure: Amputation, toe, right  Findings and Treatment: R hallux amputation p/w right 3rd digit wound going on for a few weeks     DISCHARGE

## 2023-09-30 ENCOUNTER — TRANSCRIPTION ENCOUNTER (OUTPATIENT)
Age: 63
End: 2023-09-30

## 2023-09-30 ENCOUNTER — RESULT REVIEW (OUTPATIENT)
Age: 63
End: 2023-09-30

## 2023-09-30 DIAGNOSIS — G30.0 ALZHEIMER'S DISEASE WITH EARLY ONSET: ICD-10-CM

## 2023-09-30 LAB
ALBUMIN SERPL ELPH-MCNC: 3.6 G/DL — SIGNIFICANT CHANGE UP (ref 3.3–5)
ALP SERPL-CCNC: 104 U/L — SIGNIFICANT CHANGE UP (ref 40–120)
ALT FLD-CCNC: 12 U/L — SIGNIFICANT CHANGE UP (ref 10–45)
ANION GAP SERPL CALC-SCNC: 11 MMOL/L — SIGNIFICANT CHANGE UP (ref 5–17)
APTT BLD: 29.7 SEC — SIGNIFICANT CHANGE UP (ref 24.5–35.6)
AST SERPL-CCNC: 15 U/L — SIGNIFICANT CHANGE UP (ref 10–40)
BASOPHILS # BLD AUTO: 0.07 K/UL — SIGNIFICANT CHANGE UP (ref 0–0.2)
BASOPHILS NFR BLD AUTO: 0.7 % — SIGNIFICANT CHANGE UP (ref 0–2)
BILIRUB SERPL-MCNC: 0.3 MG/DL — SIGNIFICANT CHANGE UP (ref 0.2–1.2)
BLD GP AB SCN SERPL QL: NEGATIVE — SIGNIFICANT CHANGE UP
BUN SERPL-MCNC: 28 MG/DL — HIGH (ref 7–23)
CALCIUM SERPL-MCNC: 9.5 MG/DL — SIGNIFICANT CHANGE UP (ref 8.4–10.5)
CHLORIDE SERPL-SCNC: 101 MMOL/L — SIGNIFICANT CHANGE UP (ref 96–108)
CO2 SERPL-SCNC: 26 MMOL/L — SIGNIFICANT CHANGE UP (ref 22–31)
CREAT SERPL-MCNC: 0.95 MG/DL — SIGNIFICANT CHANGE UP (ref 0.5–1.3)
EGFR: 68 ML/MIN/1.73M2 — SIGNIFICANT CHANGE UP
EOSINOPHIL # BLD AUTO: 0.31 K/UL — SIGNIFICANT CHANGE UP (ref 0–0.5)
EOSINOPHIL NFR BLD AUTO: 3.1 % — SIGNIFICANT CHANGE UP (ref 0–6)
GLUCOSE BLDC GLUCOMTR-MCNC: 108 MG/DL — HIGH (ref 70–99)
GLUCOSE BLDC GLUCOMTR-MCNC: 115 MG/DL — HIGH (ref 70–99)
GLUCOSE BLDC GLUCOMTR-MCNC: 133 MG/DL — HIGH (ref 70–99)
GLUCOSE BLDC GLUCOMTR-MCNC: 184 MG/DL — HIGH (ref 70–99)
GLUCOSE SERPL-MCNC: 170 MG/DL — HIGH (ref 70–99)
HCT VFR BLD CALC: 35.7 % — SIGNIFICANT CHANGE UP (ref 34.5–45)
HGB BLD-MCNC: 11.2 G/DL — LOW (ref 11.5–15.5)
IMM GRANULOCYTES NFR BLD AUTO: 0.3 % — SIGNIFICANT CHANGE UP (ref 0–0.9)
INR BLD: 0.93 — SIGNIFICANT CHANGE UP (ref 0.85–1.18)
LYMPHOCYTES # BLD AUTO: 2.97 K/UL — SIGNIFICANT CHANGE UP (ref 1–3.3)
LYMPHOCYTES # BLD AUTO: 29.6 % — SIGNIFICANT CHANGE UP (ref 13–44)
MAGNESIUM SERPL-MCNC: 2 MG/DL — SIGNIFICANT CHANGE UP (ref 1.6–2.6)
MCHC RBC-ENTMCNC: 25.6 PG — LOW (ref 27–34)
MCHC RBC-ENTMCNC: 31.4 GM/DL — LOW (ref 32–36)
MCV RBC AUTO: 81.5 FL — SIGNIFICANT CHANGE UP (ref 80–100)
MONOCYTES # BLD AUTO: 0.64 K/UL — SIGNIFICANT CHANGE UP (ref 0–0.9)
MONOCYTES NFR BLD AUTO: 6.4 % — SIGNIFICANT CHANGE UP (ref 2–14)
NEUTROPHILS # BLD AUTO: 6.01 K/UL — SIGNIFICANT CHANGE UP (ref 1.8–7.4)
NEUTROPHILS NFR BLD AUTO: 59.9 % — SIGNIFICANT CHANGE UP (ref 43–77)
NRBC # BLD: 0 /100 WBCS — SIGNIFICANT CHANGE UP (ref 0–0)
PHOSPHATE SERPL-MCNC: 3.1 MG/DL — SIGNIFICANT CHANGE UP (ref 2.5–4.5)
PLATELET # BLD AUTO: 363 K/UL — SIGNIFICANT CHANGE UP (ref 150–400)
POTASSIUM SERPL-MCNC: 4 MMOL/L — SIGNIFICANT CHANGE UP (ref 3.5–5.3)
POTASSIUM SERPL-SCNC: 4 MMOL/L — SIGNIFICANT CHANGE UP (ref 3.5–5.3)
PROT SERPL-MCNC: 7.3 G/DL — SIGNIFICANT CHANGE UP (ref 6–8.3)
PROTHROM AB SERPL-ACNC: 10.6 SEC — SIGNIFICANT CHANGE UP (ref 9.5–13)
RBC # BLD: 4.38 M/UL — SIGNIFICANT CHANGE UP (ref 3.8–5.2)
RBC # FLD: 17.2 % — HIGH (ref 10.3–14.5)
RH IG SCN BLD-IMP: POSITIVE — SIGNIFICANT CHANGE UP
SODIUM SERPL-SCNC: 138 MMOL/L — SIGNIFICANT CHANGE UP (ref 135–145)
WBC # BLD: 10.03 K/UL — SIGNIFICANT CHANGE UP (ref 3.8–10.5)
WBC # FLD AUTO: 10.03 K/UL — SIGNIFICANT CHANGE UP (ref 3.8–10.5)

## 2023-09-30 PROCEDURE — 99233 SBSQ HOSP IP/OBS HIGH 50: CPT

## 2023-09-30 PROCEDURE — 71045 X-RAY EXAM CHEST 1 VIEW: CPT | Mod: 26

## 2023-09-30 PROCEDURE — 88305 TISSUE EXAM BY PATHOLOGIST: CPT | Mod: 26

## 2023-09-30 PROCEDURE — 73630 X-RAY EXAM OF FOOT: CPT | Mod: 26,RT

## 2023-09-30 RX ORDER — METOPROLOL TARTRATE 50 MG
50 TABLET ORAL DAILY
Refills: 0 | Status: DISCONTINUED | OUTPATIENT
Start: 2023-09-30 | End: 2023-10-01

## 2023-09-30 RX ORDER — HYDROMORPHONE HYDROCHLORIDE 2 MG/ML
0.5 INJECTION INTRAMUSCULAR; INTRAVENOUS; SUBCUTANEOUS ONCE
Refills: 0 | Status: DISCONTINUED | OUTPATIENT
Start: 2023-09-30 | End: 2023-10-01

## 2023-09-30 RX ORDER — CLOPIDOGREL BISULFATE 75 MG/1
75 TABLET, FILM COATED ORAL DAILY
Refills: 0 | Status: DISCONTINUED | OUTPATIENT
Start: 2023-09-30 | End: 2023-10-01

## 2023-09-30 RX ORDER — ASPIRIN/CALCIUM CARB/MAGNESIUM 324 MG
81 TABLET ORAL DAILY
Refills: 0 | Status: DISCONTINUED | OUTPATIENT
Start: 2023-09-30 | End: 2023-10-01

## 2023-09-30 RX ADMIN — Medication 250 MILLIGRAM(S): at 06:57

## 2023-09-30 RX ADMIN — Medication 3 MILLILITER(S): at 16:17

## 2023-09-30 RX ADMIN — GABAPENTIN 300 MILLIGRAM(S): 400 CAPSULE ORAL at 17:22

## 2023-09-30 RX ADMIN — PIPERACILLIN AND TAZOBACTAM 25 GRAM(S): 4; .5 INJECTION, POWDER, LYOPHILIZED, FOR SOLUTION INTRAVENOUS at 10:38

## 2023-09-30 RX ADMIN — PIPERACILLIN AND TAZOBACTAM 25 GRAM(S): 4; .5 INJECTION, POWDER, LYOPHILIZED, FOR SOLUTION INTRAVENOUS at 01:37

## 2023-09-30 RX ADMIN — ATORVASTATIN CALCIUM 80 MILLIGRAM(S): 80 TABLET, FILM COATED ORAL at 22:35

## 2023-09-30 RX ADMIN — INSULIN GLARGINE 15 UNIT(S): 100 INJECTION, SOLUTION SUBCUTANEOUS at 22:36

## 2023-09-30 RX ADMIN — GABAPENTIN 300 MILLIGRAM(S): 400 CAPSULE ORAL at 06:57

## 2023-09-30 RX ADMIN — Medication 250 MILLIGRAM(S): at 17:22

## 2023-09-30 RX ADMIN — SERTRALINE 100 MILLIGRAM(S): 25 TABLET, FILM COATED ORAL at 12:02

## 2023-09-30 RX ADMIN — PANTOPRAZOLE SODIUM 40 MILLIGRAM(S): 20 TABLET, DELAYED RELEASE ORAL at 06:56

## 2023-09-30 RX ADMIN — Medication 3 MILLILITER(S): at 10:38

## 2023-09-30 RX ADMIN — Medication 2: at 22:34

## 2023-09-30 RX ADMIN — Medication 3 MILLILITER(S): at 22:35

## 2023-09-30 RX ADMIN — PIPERACILLIN AND TAZOBACTAM 25 GRAM(S): 4; .5 INJECTION, POWDER, LYOPHILIZED, FOR SOLUTION INTRAVENOUS at 20:25

## 2023-09-30 RX ADMIN — BUPROPION HYDROCHLORIDE 150 MILLIGRAM(S): 150 TABLET, EXTENDED RELEASE ORAL at 12:02

## 2023-09-30 RX ADMIN — Medication 3 MILLILITER(S): at 05:34

## 2023-09-30 NOTE — PROGRESS NOTE ADULT - SUBJECTIVE AND OBJECTIVE BOX
**INCOMPLETE NOTE    OVERNIGHT EVENTS:    SUBJECTIVE:  Patient seen and examined at bedside.    Vital Signs Last 12 Hrs  T(F): 97.6 (09-30-23 @ 05:44), Max: 97.8 (09-29-23 @ 20:36)  HR: 80 (09-30-23 @ 05:44) (80 - 80)  BP: 104/64 (09-30-23 @ 05:44) (104/64 - 114/77)  BP(mean): 89 (09-29-23 @ 20:36) (89 - 89)  RR: 18 (09-29-23 @ 20:36) (18 - 18)  SpO2: 96% (09-30-23 @ 05:44) (95% - 96%)  I&O's Summary      PHYSICAL EXAM:  Constitutional: NAD, comfortable in bed.  HEENT: NC/AT, PERRLA, EOMI, no conjunctival pallor or scleral icterus, MMM  Neck: Supple, no JVD  Respiratory: CTA B/L. No w/r/r.   Cardiovascular: RRR, normal S1 and S2, no m/r/g.   Gastrointestinal: +BS, soft NTND, no guarding or rebound tenderness, no palpable masses   Extremities: wwp; no cyanosis, clubbing or edema.   Vascular: Pulses equal and strong throughout.   Neurological: AAOx3, no CN deficits, strength and sensation intact throughout.   Skin: No gross skin abnormalities or rashes        LABS:                        11.6   9.67  )-----------( 364      ( 29 Sep 2023 06:10 )             36.1     09-29    138  |  103  |  23  ----------------------------<  104<H>  3.9   |  26  |  1.01    Ca    9.3      29 Sep 2023 06:10  Phos  3.4     09-29  Mg     2.0     09-29    TPro  7.3  /  Alb  3.5  /  TBili  0.4  /  DBili  x   /  AST  13  /  ALT  10  /  AlkPhos  110  09-29    PT/INR - ( 29 Sep 2023 06:10 )   PT: 10.9 sec;   INR: 0.95          PTT - ( 29 Sep 2023 06:10 )  PTT:29.0 sec  Urinalysis Basic - ( 29 Sep 2023 06:10 )    Color: x / Appearance: x / SG: x / pH: x  Gluc: 104 mg/dL / Ketone: x  / Bili: x / Urobili: x   Blood: x / Protein: x / Nitrite: x   Leuk Esterase: x / RBC: x / WBC x   Sq Epi: x / Non Sq Epi: x / Bacteria: x          RADIOLOGY & ADDITIONAL TESTS:    MEDICATIONS  (STANDING):  albuterol/ipratropium for Nebulization 3 milliLiter(s) Nebulizer every 6 hours  aspirin  chewable 81 milliGRAM(s) Oral daily  atorvastatin 80 milliGRAM(s) Oral at bedtime  buPROPion XL (24-Hour) . 150 milliGRAM(s) Oral daily  clopidogrel Tablet 75 milliGRAM(s) Oral daily  dextrose 5%. 1000 milliLiter(s) (50 mL/Hr) IV Continuous <Continuous>  dextrose 5%. 1000 milliLiter(s) (100 mL/Hr) IV Continuous <Continuous>  dextrose 50% Injectable 25 Gram(s) IV Push once  dextrose 50% Injectable 12.5 Gram(s) IV Push once  enoxaparin Injectable 40 milliGRAM(s) SubCutaneous every 12 hours  gabapentin 300 milliGRAM(s) Oral every 12 hours  glucagon  Injectable 1 milliGRAM(s) IntraMuscular once  influenza   Vaccine 0.5 milliLiter(s) IntraMuscular once  insulin glargine Injectable (LANTUS) 15 Unit(s) SubCutaneous at bedtime  insulin lispro (ADMELOG) corrective regimen sliding scale   SubCutaneous Before meals and at bedtime  metoprolol succinate ER 50 milliGRAM(s) Oral daily  nicotine  Polacrilex Lozenge 4 milliGRAM(s) Oral every 4 hours  pantoprazole    Tablet 40 milliGRAM(s) Oral before breakfast  piperacillin/tazobactam IVPB.. 3.375 Gram(s) IV Intermittent every 8 hours  sertraline 100 milliGRAM(s) Oral daily  vancomycin  IVPB 1000 milliGRAM(s) IV Intermittent every 12 hours    MEDICATIONS  (PRN):  acetaminophen     Tablet .. 650 milliGRAM(s) Oral every 6 hours PRN Temp greater or equal to 38C (100.4F), Mild Pain (1 - 3)  dextrose Oral Gel 15 Gram(s) Oral once PRN Blood Glucose LESS THAN 70 milliGRAM(s)/deciliter  melatonin 3 milliGRAM(s) Oral at bedtime PRN Insomnia   OVERNIGHT EVENTS: RITA    SUBJECTIVE:  Patient seen and examined at bedside, pt is feeling well today with no acute complaints she is planing to go to the OR at 1pm. Pt reports no cp, sob, n/v/d.     Vital Signs Last 12 Hrs  T(F): 97.6 (09-30-23 @ 05:44), Max: 97.8 (09-29-23 @ 20:36)  HR: 80 (09-30-23 @ 05:44) (80 - 80)  BP: 104/64 (09-30-23 @ 05:44) (104/64 - 114/77)  BP(mean): 89 (09-29-23 @ 20:36) (89 - 89)  RR: 18 (09-29-23 @ 20:36) (18 - 18)  SpO2: 96% (09-30-23 @ 05:44) (95% - 96%)  I&O's Summary      PHYSICAL EXAM:  Constitutional: NAD, comfortable in bed.  HEENT: NC/AT, PERRLA, EOMI, no conjunctival pallor or scleral icterus, MMM  Neck: Supple, no JVD  Respiratory: CTA B/L. No w/r/r.   Cardiovascular: RRR, normal S1 and S2, no m/r/g.   Gastrointestinal: +BS, soft NTND, no guarding or rebound tenderness, no palpable masses   Extremities: wwp; no cyanosis, clubbing or edema.   Vascular: Pulses equal and strong throughout.   Neurological: AAOx3, no CN deficits, strength and sensation intact throughout.   Skin: No gross skin abnormalities or rashes        LABS:                        11.6   9.67  )-----------( 364      ( 29 Sep 2023 06:10 )             36.1     09-29    138  |  103  |  23  ----------------------------<  104<H>  3.9   |  26  |  1.01    Ca    9.3      29 Sep 2023 06:10  Phos  3.4     09-29  Mg     2.0     09-29    TPro  7.3  /  Alb  3.5  /  TBili  0.4  /  DBili  x   /  AST  13  /  ALT  10  /  AlkPhos  110  09-29    PT/INR - ( 29 Sep 2023 06:10 )   PT: 10.9 sec;   INR: 0.95          PTT - ( 29 Sep 2023 06:10 )  PTT:29.0 sec  Urinalysis Basic - ( 29 Sep 2023 06:10 )    Color: x / Appearance: x / SG: x / pH: x  Gluc: 104 mg/dL / Ketone: x  / Bili: x / Urobili: x   Blood: x / Protein: x / Nitrite: x   Leuk Esterase: x / RBC: x / WBC x   Sq Epi: x / Non Sq Epi: x / Bacteria: x          RADIOLOGY & ADDITIONAL TESTS:    MEDICATIONS  (STANDING):  albuterol/ipratropium for Nebulization 3 milliLiter(s) Nebulizer every 6 hours  aspirin  chewable 81 milliGRAM(s) Oral daily  atorvastatin 80 milliGRAM(s) Oral at bedtime  buPROPion XL (24-Hour) . 150 milliGRAM(s) Oral daily  clopidogrel Tablet 75 milliGRAM(s) Oral daily  dextrose 5%. 1000 milliLiter(s) (50 mL/Hr) IV Continuous <Continuous>  dextrose 5%. 1000 milliLiter(s) (100 mL/Hr) IV Continuous <Continuous>  dextrose 50% Injectable 25 Gram(s) IV Push once  dextrose 50% Injectable 12.5 Gram(s) IV Push once  enoxaparin Injectable 40 milliGRAM(s) SubCutaneous every 12 hours  gabapentin 300 milliGRAM(s) Oral every 12 hours  glucagon  Injectable 1 milliGRAM(s) IntraMuscular once  influenza   Vaccine 0.5 milliLiter(s) IntraMuscular once  insulin glargine Injectable (LANTUS) 15 Unit(s) SubCutaneous at bedtime  insulin lispro (ADMELOG) corrective regimen sliding scale   SubCutaneous Before meals and at bedtime  metoprolol succinate ER 50 milliGRAM(s) Oral daily  nicotine  Polacrilex Lozenge 4 milliGRAM(s) Oral every 4 hours  pantoprazole    Tablet 40 milliGRAM(s) Oral before breakfast  piperacillin/tazobactam IVPB.. 3.375 Gram(s) IV Intermittent every 8 hours  sertraline 100 milliGRAM(s) Oral daily  vancomycin  IVPB 1000 milliGRAM(s) IV Intermittent every 12 hours    MEDICATIONS  (PRN):  acetaminophen     Tablet .. 650 milliGRAM(s) Oral every 6 hours PRN Temp greater or equal to 38C (100.4F), Mild Pain (1 - 3)  dextrose Oral Gel 15 Gram(s) Oral once PRN Blood Glucose LESS THAN 70 milliGRAM(s)/deciliter  melatonin 3 milliGRAM(s) Oral at bedtime PRN Insomnia   OVERNIGHT EVENTS: RITA    SUBJECTIVE:  Patient seen and examined at bedside, pt is feeling well today with no acute complaints she is planing to go to the OR at 1pm. Pt reports no cp, sob, n/v/d.     Vital Signs Last 12 Hrs  T(F): 97.6 (09-30-23 @ 05:44), Max: 97.8 (09-29-23 @ 20:36)  HR: 80 (09-30-23 @ 05:44) (80 - 80)  BP: 104/64 (09-30-23 @ 05:44) (104/64 - 114/77)  BP(mean): 89 (09-29-23 @ 20:36) (89 - 89)  RR: 18 (09-29-23 @ 20:36) (18 - 18)  SpO2: 96% (09-30-23 @ 05:44) (95% - 96%)  I&O's Summary      PHYSICAL EXAM:  Constitutional: NAD, comfortable in bed.  HEENT: NC/AT, no scleral icterus, MMM  Neck: Supple, no JVD  Respiratory: CTA B/L. No w/r/r.   Cardiovascular: RRR, normal S1 and S2, no m/r/g.   Gastrointestinal: +BS, soft NTND, no guarding or rebound tenderness, no palpable masses   Extremities: wwp no edema, R 3rd toe ulcer/OM.  Vascular: Pulses equal and strong throughout.   Neurological: AAOx3        LABS:                        11.6   9.67  )-----------( 364      ( 29 Sep 2023 06:10 )             36.1     09-29    138  |  103  |  23  ----------------------------<  104<H>  3.9   |  26  |  1.01    Ca    9.3      29 Sep 2023 06:10  Phos  3.4     09-29  Mg     2.0     09-29    TPro  7.3  /  Alb  3.5  /  TBili  0.4  /  DBili  x   /  AST  13  /  ALT  10  /  AlkPhos  110  09-29    PT/INR - ( 29 Sep 2023 06:10 )   PT: 10.9 sec;   INR: 0.95          PTT - ( 29 Sep 2023 06:10 )  PTT:29.0 sec  Urinalysis Basic - ( 29 Sep 2023 06:10 )    Color: x / Appearance: x / SG: x / pH: x  Gluc: 104 mg/dL / Ketone: x  / Bili: x / Urobili: x   Blood: x / Protein: x / Nitrite: x   Leuk Esterase: x / RBC: x / WBC x   Sq Epi: x / Non Sq Epi: x / Bacteria: x          RADIOLOGY & ADDITIONAL TESTS:    MEDICATIONS  (STANDING):  albuterol/ipratropium for Nebulization 3 milliLiter(s) Nebulizer every 6 hours  aspirin  chewable 81 milliGRAM(s) Oral daily  atorvastatin 80 milliGRAM(s) Oral at bedtime  buPROPion XL (24-Hour) . 150 milliGRAM(s) Oral daily  clopidogrel Tablet 75 milliGRAM(s) Oral daily  dextrose 5%. 1000 milliLiter(s) (50 mL/Hr) IV Continuous <Continuous>  dextrose 5%. 1000 milliLiter(s) (100 mL/Hr) IV Continuous <Continuous>  dextrose 50% Injectable 25 Gram(s) IV Push once  dextrose 50% Injectable 12.5 Gram(s) IV Push once  enoxaparin Injectable 40 milliGRAM(s) SubCutaneous every 12 hours  gabapentin 300 milliGRAM(s) Oral every 12 hours  glucagon  Injectable 1 milliGRAM(s) IntraMuscular once  influenza   Vaccine 0.5 milliLiter(s) IntraMuscular once  insulin glargine Injectable (LANTUS) 15 Unit(s) SubCutaneous at bedtime  insulin lispro (ADMELOG) corrective regimen sliding scale   SubCutaneous Before meals and at bedtime  metoprolol succinate ER 50 milliGRAM(s) Oral daily  nicotine  Polacrilex Lozenge 4 milliGRAM(s) Oral every 4 hours  pantoprazole    Tablet 40 milliGRAM(s) Oral before breakfast  piperacillin/tazobactam IVPB.. 3.375 Gram(s) IV Intermittent every 8 hours  sertraline 100 milliGRAM(s) Oral daily  vancomycin  IVPB 1000 milliGRAM(s) IV Intermittent every 12 hours    MEDICATIONS  (PRN):  acetaminophen     Tablet .. 650 milliGRAM(s) Oral every 6 hours PRN Temp greater or equal to 38C (100.4F), Mild Pain (1 - 3)  dextrose Oral Gel 15 Gram(s) Oral once PRN Blood Glucose LESS THAN 70 milliGRAM(s)/deciliter  melatonin 3 milliGRAM(s) Oral at bedtime PRN Insomnia

## 2023-09-30 NOTE — PRE-ANESTHESIA EVALUATION ADULT - NSANTHPMHFT_GEN_ALL_CORE
62F PMH CAD w/ stents, DM, HTN, PAD w/ left LE arterial stent, CVA (no residual), MI x 2 (most recent 2013), peripheral neuropathy, mild early dementia, recent R hallux amputation p/w right 3rd digit wound going on for a few weeks. Podiatry consulted for evaluation of wound, r/o gas, ro OM. WBC elevated to 12.67, ESR and CRP pending at time of initial ED consult. XR wet read showing cortical erosion and periosteal reaction at R 3rd digit concerning for OM at this time. MRI shows OM within the middle and distal phalanges of third toe. Planned for Right partial 3rd ray resection with washout on 9/30/2023 after 1pm.

## 2023-09-30 NOTE — PROGRESS NOTE ADULT - ASSESSMENT
62F PMH CAD w/ stents, DM, HTN, PAD w/ left LE arterial stent, CVA (no residual), MI x 2 (most recent 2013), peripheral neuropathy, mild early dementia, recent R hallux amputation p/w right 3rd digit wound going on for a few weeks. Podiatry consulted for evaluation of wound, r/o gas, ro OM. WBC elevated to 12.67, ESR and CRP pending at time of initial ED consult. XR wet read showing cortical erosion and periosteal reaction at R 3rd digit concerning for OM at this time. MRI shows OM within the middle and distal phalanges of third toe. Planned for Right partial 3rd ray resection with washout on 9/30/2023 after 1pm.       Plan:  - Plan for Right partial 3rd ray resection with washout in the OR Saturday 9/30/2023, after 1pm  - Please have patient medically optimized for surgery  - C/w NPO until after procedure   - C/w broad spectrum IV ABX (vanc/zosyn)  - Local wound care: wound cleansed with normal saline and wet to dry dressing applied   - Pt can be WBAT to R foot  - Rest of care per primary team    Podiatry following. Plan d/w Attending.

## 2023-09-30 NOTE — PROGRESS NOTE ADULT - ASSESSMENT
63yo F PMH CAD and MI s/p ETELVINA x2 (2012/2013), T2DM c/b peripheral neuropathy, HTN, PAD w/ b/l arterial stents, CVA (no residual deficits), mild early dementia s/p R great toe amputation now presenting with R 3rd toe OM pending amputation with podiatry over the weekend.  61yo F PMH CAD and MI s/p ETELVINA x2 (2012/2013), T2DM c/b peripheral neuropathy, HTN, PAD w/ b/l arterial stents, CVA (no residual deficits), mild early dementia s/p R great toe amputation now presenting with R 3rd toe OM pending amputation with podiatry today at 1PM

## 2023-09-30 NOTE — PROGRESS NOTE ADULT - PROBLEM SELECTOR PLAN 3
Hx of PAD, s/p 3 stents, with upcoming stent placement March 3 on the R leg. Most recent stent placed 2 weeks ago  - f/u with podiatry regarding holding preop.   - c/w Plavix  - c/w ASA Hx of PAD, s/p 3 stents, with upcoming stent placement March 3 on the R leg. Most recent stent placed 2 weeks ago  - f/u with podiatry regarding restarting ASA plavix    - holing  Plavix  - holding  ASA Hx of PAD, s/p 3 stents, most recent stent of her R leg.   - f/u with podiatry regarding restarting ASA plavix    - holing  Plavix  - holding  ASA

## 2023-09-30 NOTE — PROGRESS NOTE ADULT - PROBLEM SELECTOR PLAN 9
F: none  E: replenish PRN  N: dash/tlc + cc  GI ppx: pantoprazole 40mg qAM  DVT ppx: lovenox 40mg q24h  Dispo: [facility] F: none  E: replenish PRN  N: dash/tlc + cc  GI ppx: pantoprazole 40mg qAM  DVT ppx: holding preop lovenox 40mg q24h  Dispo: [facility]

## 2023-09-30 NOTE — PROGRESS NOTE ADULT - SUBJECTIVE AND OBJECTIVE BOX
PRE-OP NOTE    Pre-Op Diagnosis: R 3rd digit osteomyelitis   Planned Procedure: R 3rd partial ray amputation vs R 3rd digit amputation  Scheduled Date / Time: 9/30/2023  Indication: R 3rd digit osteomyelitis     Labs:                       11.2   10.03 )-----------( 363      ( 30 Sep 2023 08:56 )             35.7   09-30    138  |  101  |  28<H>  ----------------------------<  170<H>  4.0   |  26  |  0.95    Ca    9.5      30 Sep 2023 08:56  Phos  3.1     09-30  Mg     2.0     09-30    TPro  7.3  /  Alb  3.6  /  TBili  0.3  /  DBili  x   /  AST  15  /  ALT  12  /  AlkPhos  104  09-30  LIVER FUNCTIONS - ( 30 Sep 2023 08:56 )  Alb: 3.6 g/dL / Pro: 7.3 g/dL / ALK PHOS: 104 U/L / ALT: 12 U/L / AST: 15 U/L / GGT: x           Vitals: Vital Signs Last 24 Hrs  T(C): 36.4 (30 Sep 2023 09:05), Max: 36.8 (29 Sep 2023 12:10)  T(F): 97.6 (30 Sep 2023 09:05), Max: 98.3 (29 Sep 2023 12:10)  HR: 80 (30 Sep 2023 09:05) (74 - 80)  BP: 104/64 (30 Sep 2023 09:05) (104/64 - 121/79)  BP(mean): 89 (29 Sep 2023 20:36) (89 - 89)  RR: 16 (30 Sep 2023 09:05) (16 - 19)  SpO2: 97% (30 Sep 2023 09:05) (94% - 97%)    Parameters below as of 30 Sep 2023 05:44  Patient On (Oxygen Delivery Method): room air      PE:   Official CXR reading: (On Chart)  Official EKG reading: (On Chart)  Type and Cross/Screen: (On chart)  NPO after MN  Antibiotics: vanc/zosyn   Operative Consent (on chart)

## 2023-09-30 NOTE — BRIEF OPERATIVE NOTE - OPERATION/FINDINGS
Pt given pre-op local anesthetic consisting of 10 ccs of 1% lidocaine plain mixed with 0.5% marcaine plain in a digit block fashion. Made fishmouth incision around Right 3rd digit PIPJ down to bone. Dis-articulated digit at level of PIPJ with #15 blade and sent digit for pathology. Removed soft tissue from distal proximal phalanx. Using bone cutter removed proximal phalanx head and sent for pathology. Copiously irrigated with normal saline. Closed incision with 3-0 nylon. Dressed with xeroform, gauze, ABD, kerlix, and ACE.

## 2023-09-30 NOTE — PROGRESS NOTE ADULT - ASSESSMENT
62F PMH CAD w/ stents, DM, HTN, PAD w/ left LE arterial stent, CVA (no residual), MI x 2 (most recent 2013), peripheral neuropathy, mild early dementia, recent R hallux amputation p/w right 3rd digit wound going on for a few weeks. Podiatry consulted for evaluation of wound, r/o gas, ro OM. WBC elevated to 12.67, ESR and CRP pending at time of initial ED consult. XR wet read showing cortical erosion and periosteal reaction at R 3rd digit concerning for OM at this time. MRI shows OM within the middle and distal phalanges of third toe. Pt is now s/p Right foot partial 3rd digit amputation on 9/30       Plan:  - Pt may resume diet  - Please hold dvt ppx until 10/1  - Post op x-rays reviewed   - C/w IV abx  - If surgical site appears well clinically on 10/1, okay to discharge pt on 10 day abx course for SSTI (Bactrim/Keflex, 10/1-10/10)  - Pt can heel WBAT to R foot in a surgical shoe  - Rest of care per primary team    Podiatry following. Plan d/w Attending.

## 2023-09-30 NOTE — PROGRESS NOTE ADULT - PROBLEM SELECTOR PLAN 4
Known CAD and 2 prior MIs s/p ETELVINA x2. First stent 2013, most recent stent placed 3 months ago. Takes ASA 81mg qd, Plavix 75mg qd and Atorvastatin 40mg qhs at home.  - continue ASA, plavix, atorvastatin  - c/w plavix Known CAD and 2 prior MIs s/p ETELVINA x2. First stent 2013, most recent stent placed 3 months ago. Takes ASA 81mg qd, Plavix 75mg qd and Atorvastatin 40mg qhs at home.  - holding  ASA, plavix, atorvastatin Known CAD and 2 prior MIs s/p ETELVINA x2. First stent 2013. Takes ASA 81mg qd, Plavix 75mg qd and Atorvastatin 40mg qhs at home.  - holding  ASA, plavix, atorvastatin

## 2023-09-30 NOTE — PROGRESS NOTE ADULT - SUBJECTIVE AND OBJECTIVE BOX
POST OP NOTE    HAROON MONET  MRN-[id number]    Procedure: R foot 3rd digit amputation   Surgeon: Dr. Robin  Assistants: Pat Uribe, PGY-2    Patient tolerated procedure well without incident.  Patient transferred to PACU in stable condition. Denies any pedal pain. Denies n/v/f/c/sob.     PE / Post Op Check:       GEN: NAD, AAOx3, resting comfortably with pain controlled      LE Focused: CFT shows adequate perfusion b/l with no signs of ischemic compromise.  No strikethrough is appreciated on surgical dressings.  Dressings were dry, clean, and intact.  No neuromuscular deficits appreciated.

## 2023-09-30 NOTE — PROGRESS NOTE ADULT - PROBLEM SELECTOR PLAN 1
MRI, which will be used to determine level of amputation. MRI R foot showed osteomyelitis within the middle and distal phalanges of R 3rd toe.   - NPO at mn for possible amputation   - f/u podiatry   - f/u wound/blood cultures   - c/w vanc 1.5g q12h  - c/w Zosyn 3.375g q8h MRI, which will be used to determine level of amputation. MRI R foot showed osteomyelitis within the middle and distal phalanges of R 3rd toe.   - f/u podiatry recs post op   - f/u cultures   - c/w vanc 1g q12h  - Vanc trough due at 6am 10/1  - c/w Zosyn 3.375g q8h #Sepsis 2/2 Right 3rd Toe Wound     (resolved)     #OM of Right 3rd Toe  MRI, which will be used to determine level of amputation. MRI R foot showed osteomyelitis within the middle and distal phalanges of R 3rd toe.   - f/u podiatry recs post op   - f/u cultures   - c/w vanc 1g q12h  - Vanc trough due at 6am 10/1  - c/w Zosyn 3.375g q8h

## 2023-10-01 ENCOUNTER — TRANSCRIPTION ENCOUNTER (OUTPATIENT)
Age: 63
End: 2023-10-01

## 2023-10-01 VITALS
OXYGEN SATURATION: 95 % | SYSTOLIC BLOOD PRESSURE: 124 MMHG | HEART RATE: 77 BPM | DIASTOLIC BLOOD PRESSURE: 74 MMHG | RESPIRATION RATE: 16 BRPM | TEMPERATURE: 98 F

## 2023-10-01 DIAGNOSIS — E11.621 TYPE 2 DIABETES MELLITUS WITH FOOT ULCER: ICD-10-CM

## 2023-10-01 LAB
ALBUMIN SERPL ELPH-MCNC: 3.5 G/DL — SIGNIFICANT CHANGE UP (ref 3.3–5)
ALP SERPL-CCNC: 102 U/L — SIGNIFICANT CHANGE UP (ref 40–120)
ALT FLD-CCNC: 14 U/L — SIGNIFICANT CHANGE UP (ref 10–45)
ANION GAP SERPL CALC-SCNC: 10 MMOL/L — SIGNIFICANT CHANGE UP (ref 5–17)
AST SERPL-CCNC: 25 U/L — SIGNIFICANT CHANGE UP (ref 10–40)
BASOPHILS # BLD AUTO: 0.07 K/UL — SIGNIFICANT CHANGE UP (ref 0–0.2)
BASOPHILS NFR BLD AUTO: 0.7 % — SIGNIFICANT CHANGE UP (ref 0–2)
BILIRUB SERPL-MCNC: 0.4 MG/DL — SIGNIFICANT CHANGE UP (ref 0.2–1.2)
BUN SERPL-MCNC: 26 MG/DL — HIGH (ref 7–23)
CALCIUM SERPL-MCNC: 9.2 MG/DL — SIGNIFICANT CHANGE UP (ref 8.4–10.5)
CHLORIDE SERPL-SCNC: 104 MMOL/L — SIGNIFICANT CHANGE UP (ref 96–108)
CO2 SERPL-SCNC: 24 MMOL/L — SIGNIFICANT CHANGE UP (ref 22–31)
CREAT SERPL-MCNC: 1.03 MG/DL — SIGNIFICANT CHANGE UP (ref 0.5–1.3)
EGFR: 61 ML/MIN/1.73M2 — SIGNIFICANT CHANGE UP
EOSINOPHIL # BLD AUTO: 0.29 K/UL — SIGNIFICANT CHANGE UP (ref 0–0.5)
EOSINOPHIL NFR BLD AUTO: 2.8 % — SIGNIFICANT CHANGE UP (ref 0–6)
GLUCOSE BLDC GLUCOMTR-MCNC: 113 MG/DL — HIGH (ref 70–99)
GLUCOSE BLDC GLUCOMTR-MCNC: 143 MG/DL — HIGH (ref 70–99)
GLUCOSE BLDC GLUCOMTR-MCNC: 165 MG/DL — HIGH (ref 70–99)
GLUCOSE SERPL-MCNC: 116 MG/DL — HIGH (ref 70–99)
HCT VFR BLD CALC: 35.7 % — SIGNIFICANT CHANGE UP (ref 34.5–45)
HGB BLD-MCNC: 10.9 G/DL — LOW (ref 11.5–15.5)
IMM GRANULOCYTES NFR BLD AUTO: 0.5 % — SIGNIFICANT CHANGE UP (ref 0–0.9)
LYMPHOCYTES # BLD AUTO: 2.41 K/UL — SIGNIFICANT CHANGE UP (ref 1–3.3)
LYMPHOCYTES # BLD AUTO: 23.4 % — SIGNIFICANT CHANGE UP (ref 13–44)
MAGNESIUM SERPL-MCNC: 2.4 MG/DL — SIGNIFICANT CHANGE UP (ref 1.6–2.6)
MCHC RBC-ENTMCNC: 25.2 PG — LOW (ref 27–34)
MCHC RBC-ENTMCNC: 30.5 GM/DL — LOW (ref 32–36)
MCV RBC AUTO: 82.6 FL — SIGNIFICANT CHANGE UP (ref 80–100)
MONOCYTES # BLD AUTO: 0.75 K/UL — SIGNIFICANT CHANGE UP (ref 0–0.9)
MONOCYTES NFR BLD AUTO: 7.3 % — SIGNIFICANT CHANGE UP (ref 2–14)
NEUTROPHILS # BLD AUTO: 6.72 K/UL — SIGNIFICANT CHANGE UP (ref 1.8–7.4)
NEUTROPHILS NFR BLD AUTO: 65.3 % — SIGNIFICANT CHANGE UP (ref 43–77)
NRBC # BLD: 0 /100 WBCS — SIGNIFICANT CHANGE UP (ref 0–0)
PHOSPHATE SERPL-MCNC: 3.8 MG/DL — SIGNIFICANT CHANGE UP (ref 2.5–4.5)
PLATELET # BLD AUTO: 364 K/UL — SIGNIFICANT CHANGE UP (ref 150–400)
POTASSIUM SERPL-MCNC: 4.6 MMOL/L — SIGNIFICANT CHANGE UP (ref 3.5–5.3)
POTASSIUM SERPL-SCNC: 4.6 MMOL/L — SIGNIFICANT CHANGE UP (ref 3.5–5.3)
PROT SERPL-MCNC: 7.2 G/DL — SIGNIFICANT CHANGE UP (ref 6–8.3)
RBC # BLD: 4.32 M/UL — SIGNIFICANT CHANGE UP (ref 3.8–5.2)
RBC # FLD: 17.5 % — HIGH (ref 10.3–14.5)
SODIUM SERPL-SCNC: 138 MMOL/L — SIGNIFICANT CHANGE UP (ref 135–145)
VANCOMYCIN TROUGH SERPL-MCNC: 16 UG/ML — SIGNIFICANT CHANGE UP (ref 10–20)
WBC # BLD: 10.29 K/UL — SIGNIFICANT CHANGE UP (ref 3.8–10.5)
WBC # FLD AUTO: 10.29 K/UL — SIGNIFICANT CHANGE UP (ref 3.8–10.5)

## 2023-10-01 PROCEDURE — 93005 ELECTROCARDIOGRAM TRACING: CPT

## 2023-10-01 PROCEDURE — 85610 PROTHROMBIN TIME: CPT

## 2023-10-01 PROCEDURE — 71045 X-RAY EXAM CHEST 1 VIEW: CPT

## 2023-10-01 PROCEDURE — 83036 HEMOGLOBIN GLYCOSYLATED A1C: CPT

## 2023-10-01 PROCEDURE — 73718 MRI LOWER EXTREMITY W/O DYE: CPT

## 2023-10-01 PROCEDURE — 82553 CREATINE MB FRACTION: CPT

## 2023-10-01 PROCEDURE — 86901 BLOOD TYPING SEROLOGIC RH(D): CPT

## 2023-10-01 PROCEDURE — 84484 ASSAY OF TROPONIN QUANT: CPT

## 2023-10-01 PROCEDURE — 88305 TISSUE EXAM BY PATHOLOGIST: CPT

## 2023-10-01 PROCEDURE — 82550 ASSAY OF CK (CPK): CPT

## 2023-10-01 PROCEDURE — 80053 COMPREHEN METABOLIC PANEL: CPT

## 2023-10-01 PROCEDURE — 87040 BLOOD CULTURE FOR BACTERIA: CPT

## 2023-10-01 PROCEDURE — 83735 ASSAY OF MAGNESIUM: CPT

## 2023-10-01 PROCEDURE — 82962 GLUCOSE BLOOD TEST: CPT

## 2023-10-01 PROCEDURE — 85025 COMPLETE CBC W/AUTO DIFF WBC: CPT

## 2023-10-01 PROCEDURE — 73630 X-RAY EXAM OF FOOT: CPT

## 2023-10-01 PROCEDURE — 85652 RBC SED RATE AUTOMATED: CPT

## 2023-10-01 PROCEDURE — 36415 COLL VENOUS BLD VENIPUNCTURE: CPT

## 2023-10-01 PROCEDURE — 97164 PT RE-EVAL EST PLAN CARE: CPT

## 2023-10-01 PROCEDURE — 87635 SARS-COV-2 COVID-19 AMP PRB: CPT

## 2023-10-01 PROCEDURE — 84100 ASSAY OF PHOSPHORUS: CPT

## 2023-10-01 PROCEDURE — 80202 ASSAY OF VANCOMYCIN: CPT

## 2023-10-01 PROCEDURE — 86900 BLOOD TYPING SEROLOGIC ABO: CPT

## 2023-10-01 PROCEDURE — 83605 ASSAY OF LACTIC ACID: CPT

## 2023-10-01 PROCEDURE — 97161 PT EVAL LOW COMPLEX 20 MIN: CPT

## 2023-10-01 PROCEDURE — 80048 BASIC METABOLIC PNL TOTAL CA: CPT

## 2023-10-01 PROCEDURE — 86140 C-REACTIVE PROTEIN: CPT

## 2023-10-01 PROCEDURE — 99285 EMERGENCY DEPT VISIT HI MDM: CPT | Mod: 25

## 2023-10-01 PROCEDURE — 85730 THROMBOPLASTIN TIME PARTIAL: CPT

## 2023-10-01 PROCEDURE — 86850 RBC ANTIBODY SCREEN: CPT

## 2023-10-01 PROCEDURE — 94640 AIRWAY INHALATION TREATMENT: CPT

## 2023-10-01 RX ORDER — CEPHALEXIN 500 MG
500 CAPSULE ORAL EVERY 12 HOURS
Refills: 0 | Status: DISCONTINUED | OUTPATIENT
Start: 2023-10-01 | End: 2023-10-01

## 2023-10-01 RX ORDER — CEPHALEXIN 500 MG
1 CAPSULE ORAL
Qty: 20 | Refills: 0
Start: 2023-10-01 | End: 2023-10-10

## 2023-10-01 RX ORDER — ENOXAPARIN SODIUM 100 MG/ML
40 INJECTION SUBCUTANEOUS EVERY 12 HOURS
Refills: 0 | Status: DISCONTINUED | OUTPATIENT
Start: 2023-10-01 | End: 2023-10-01

## 2023-10-01 RX ADMIN — HYDROMORPHONE HYDROCHLORIDE 0.5 MILLIGRAM(S): 2 INJECTION INTRAMUSCULAR; INTRAVENOUS; SUBCUTANEOUS at 06:03

## 2023-10-01 RX ADMIN — BUPROPION HYDROCHLORIDE 150 MILLIGRAM(S): 150 TABLET, EXTENDED RELEASE ORAL at 11:32

## 2023-10-01 RX ADMIN — Medication 2: at 12:33

## 2023-10-01 RX ADMIN — Medication 3 MILLILITER(S): at 04:10

## 2023-10-01 RX ADMIN — HYDROMORPHONE HYDROCHLORIDE 0.5 MILLIGRAM(S): 2 INJECTION INTRAMUSCULAR; INTRAVENOUS; SUBCUTANEOUS at 06:20

## 2023-10-01 RX ADMIN — Medication 81 MILLIGRAM(S): at 11:32

## 2023-10-01 RX ADMIN — Medication 650 MILLIGRAM(S): at 07:04

## 2023-10-01 RX ADMIN — Medication 1 TABLET(S): at 12:33

## 2023-10-01 RX ADMIN — Medication 500 MILLIGRAM(S): at 12:33

## 2023-10-01 RX ADMIN — Medication 50 MILLIGRAM(S): at 06:03

## 2023-10-01 RX ADMIN — GABAPENTIN 300 MILLIGRAM(S): 400 CAPSULE ORAL at 06:04

## 2023-10-01 RX ADMIN — CLOPIDOGREL BISULFATE 75 MILLIGRAM(S): 75 TABLET, FILM COATED ORAL at 11:33

## 2023-10-01 RX ADMIN — Medication 3 MILLILITER(S): at 10:07

## 2023-10-01 RX ADMIN — GABAPENTIN 300 MILLIGRAM(S): 400 CAPSULE ORAL at 17:35

## 2023-10-01 RX ADMIN — SERTRALINE 100 MILLIGRAM(S): 25 TABLET, FILM COATED ORAL at 11:33

## 2023-10-01 RX ADMIN — PIPERACILLIN AND TAZOBACTAM 25 GRAM(S): 4; .5 INJECTION, POWDER, LYOPHILIZED, FOR SOLUTION INTRAVENOUS at 02:03

## 2023-10-01 RX ADMIN — Medication 650 MILLIGRAM(S): at 06:04

## 2023-10-01 RX ADMIN — Medication 3 MILLILITER(S): at 16:06

## 2023-10-01 RX ADMIN — PIPERACILLIN AND TAZOBACTAM 25 GRAM(S): 4; .5 INJECTION, POWDER, LYOPHILIZED, FOR SOLUTION INTRAVENOUS at 10:07

## 2023-10-01 RX ADMIN — PANTOPRAZOLE SODIUM 40 MILLIGRAM(S): 20 TABLET, DELAYED RELEASE ORAL at 06:03

## 2023-10-01 NOTE — DISCHARGE NOTE NURSING/CASE MANAGEMENT/SOCIAL WORK - PATIENT PORTAL LINK FT
You can access the FollowMyHealth Patient Portal offered by [facility] by registering at the following website: [email or web address]. By joining [facility]’s FollowMyHealth portal, you will also be able to view your health information using other applications (apps) compatible with our system.

## 2023-10-01 NOTE — DISCHARGE NOTE NURSING/CASE MANAGEMENT/SOCIAL WORK - NSDCPEFALRISK_GEN_ALL_CORE
For information on Fall & Injury Prevention, visit: [email or web address] OR  [email or web address] OR  https://www.cdc.gov/steadi/patient.html

## 2023-10-01 NOTE — PROGRESS NOTE ADULT - ASSESSMENT
62F PMH CAD w/ stents, DM, HTN, PAD w/ left LE arterial stent, CVA (no residual), MI x 2 (most recent 2013), peripheral neuropathy, mild early dementia, recent R hallux amputation p/w right 3rd digit wound going on for a few weeks. Podiatry consulted for evaluation of wound, r/o gas, ro OM. WBC elevated to 12.67, ESR and CRP pending at time of initial ED consult. XR wet read showing cortical erosion and periosteal reaction at R 3rd digit concerning for OM at this time. MRI shows OM within the middle and distal phalanges of third toe. Pt is now s/p Right foot partial 3rd digit amputation on 9/30       Plan:  - Surgical site dressed with adaptic, gauze, kerlix, and ACE.    - Recommend pt be discharged on 10 day abx course for SSTI (Bactrim/Keflex, 10/1-10/10)  - Pt can heel WBAT to R foot in a surgical shoe  - Rest of care per primary team    Podiatry following. Plan d/w Attending.    Discharge recommendations:   - Patient should follow up with Dr. Alvaro Robin within 1 week of discharge.    Office information:          [place] Address- [address] [place] [address]. [address], [place] [address] Phone: [phone]  - Please leave dressing in tact until your follow up appointment with Dr. Robin

## 2023-10-01 NOTE — DISCHARGE NOTE NURSING/CASE MANAGEMENT/SOCIAL WORK - NSDCFUADDAPPT_GEN_ALL_CORE_FT
Patient should follow up with Dr. Alvaro Robin within 1 week of discharge.    Office information:          [place] Address- [address], [place] [address] Phone: [phone]  - Please leave dressing in tact until your follow up appointment with Dr. Robin

## 2023-10-01 NOTE — PROGRESS NOTE ADULT - SUBJECTIVE AND OBJECTIVE BOX
Patient is a 63y old  Female who presents with a chief complaint of Toe wound possible OM (30 Sep 2023 20:30)      INTERVAL HPI/ OVERNIGHT EVENTS. Pt seen and examined bedside. Complaining of pain around the surgical site. Dressing C/D/I.       LABS                        10.9   10.29 )-----------( 364      ( 01 Oct 2023 08:01 )             35.7     10-01    138  |  104  |  26<H>  ----------------------------<  116<H>  4.6   |  24  |  1.03    Ca    9.2      01 Oct 2023 08:01  Phos  3.8     10-01  Mg     2.4     10-01    TPro  7.2  /  Alb  3.5  /  TBili  0.4  /  DBili  x   /  AST  25  /  ALT  14  /  AlkPhos  102  10-01    PT/INR - ( 30 Sep 2023 08:56 )   PT: 10.6 sec;   INR: 0.93          PTT - ( 30 Sep 2023 08:56 )  PTT:29.7 sec    ICU Vital Signs Last 24 Hrs  T(C): 36.6 (01 Oct 2023 05:42), Max: 36.8 (30 Sep 2023 13:05)  T(F): 97.9 (01 Oct 2023 05:42), Max: 98.3 (30 Sep 2023 13:05)  HR: 79 (01 Oct 2023 05:42) (71 - 84)  BP: 113/69 (01 Oct 2023 05:42) (102/56 - 131/70)  BP(mean): 82 (30 Sep 2023 21:10) (72 - 89)  ABP: --  ABP(mean): --  RR: 20 (30 Sep 2023 20:15) (15 - 21)  SpO2: 94% (01 Oct 2023 05:42) (93% - 99%)    O2 Parameters below as of 01 Oct 2023 05:42  Patient On (Oxygen Delivery Method): room air        RADIOLOGY  < from: Xray Foot AP + Lateral + Oblique, Right (09.30.23 @ 20:42) >  IMPRESSION: Postoperative changes noted as described  < end of copied text >      PHYSICAL EXAM  Lower Extremity Focused  Vasc: right DP 1/4, PT biphasic on doppler, left DP/PT 2/4,  Derm: R great toe amp well healed, R 3rd digit surgical site with sutures in tact, no signs of dehiscence sanguinous drainage, no malodor or purulence  Neuro: Protective sensation is absent  MSK: 5/5 muscle strength in all compartments, has tenderness on palpation to R 3rd distal digit.

## 2023-10-01 NOTE — PROGRESS NOTE ADULT - REASON FOR ADMISSION
Toe wound possible OM

## 2023-10-02 ENCOUNTER — APPOINTMENT (OUTPATIENT)
Dept: HOME HEALTH SERVICES | Facility: HOME HEALTH | Age: 63
End: 2023-10-02

## 2023-10-02 ENCOUNTER — APPOINTMENT (OUTPATIENT)
Age: 63
End: 2023-10-02

## 2023-10-02 LAB
CULTURE RESULTS: SIGNIFICANT CHANGE UP
SPECIMEN SOURCE: SIGNIFICANT CHANGE UP

## 2023-10-02 RX ORDER — CEPHALEXIN 500 MG
1 CAPSULE ORAL
Qty: 20 | Refills: 0
Start: 2023-10-02 | End: 2023-10-11

## 2023-10-03 ENCOUNTER — APPOINTMENT (OUTPATIENT)
Dept: ENDOCRINOLOGY | Facility: CLINIC | Age: 63
End: 2023-10-03
Payer: MEDICARE

## 2023-10-03 PROCEDURE — 99204 OFFICE O/P NEW MOD 45 MIN: CPT | Mod: 95

## 2023-10-03 RX ORDER — INSULIN GLARGINE 100 [IU]/ML
100 INJECTION, SOLUTION SUBCUTANEOUS
Qty: 1 | Refills: 5 | Status: ACTIVE | COMMUNITY
Start: 2023-01-04 | End: 1900-01-01

## 2023-10-03 RX ORDER — BLOOD-GLUCOSE,RECEIVER,CONT
EACH MISCELLANEOUS
Qty: 1 | Refills: 0 | Status: ACTIVE | COMMUNITY
Start: 2023-10-03 | End: 1900-01-01

## 2023-10-03 RX ORDER — METFORMIN HYDROCHLORIDE 500 MG/1
500 TABLET, COATED ORAL TWICE DAILY
Qty: 180 | Refills: 3 | Status: DISCONTINUED | COMMUNITY
Start: 2023-01-04 | End: 2023-10-03

## 2023-10-03 RX ORDER — EMPAGLIFLOZIN AND METFORMIN HYDROCHLORIDE 12.5; 1 MG/1; MG/1
12.5-1 TABLET ORAL
Qty: 60 | Refills: 5 | Status: ACTIVE | COMMUNITY
Start: 2023-10-03 | End: 1900-01-01

## 2023-10-03 RX ORDER — BLOOD-GLUCOSE SENSOR
EACH MISCELLANEOUS
Qty: 1 | Refills: 5 | Status: ACTIVE | COMMUNITY
Start: 2023-10-03 | End: 1900-01-01

## 2023-10-03 RX ORDER — BLOOD-GLUCOSE TRANSMITTER
EACH MISCELLANEOUS
Qty: 1 | Refills: 3 | Status: ACTIVE | COMMUNITY
Start: 2023-10-03 | End: 1900-01-01

## 2023-10-04 ENCOUNTER — APPOINTMENT (OUTPATIENT)
Dept: HOME HEALTH SERVICES | Facility: HOME HEALTH | Age: 63
End: 2023-10-04
Payer: MEDICARE

## 2023-10-04 VITALS
HEART RATE: 81 BPM | TEMPERATURE: 97.9 F | HEIGHT: 64 IN | RESPIRATION RATE: 18 BRPM | DIASTOLIC BLOOD PRESSURE: 64 MMHG | SYSTOLIC BLOOD PRESSURE: 122 MMHG | OXYGEN SATURATION: 95 %

## 2023-10-04 DIAGNOSIS — F17.200 NICOTINE DEPENDENCE, UNSPECIFIED, UNCOMPLICATED: ICD-10-CM

## 2023-10-04 PROCEDURE — 99496 TRANSJ CARE MGMT HIGH F2F 7D: CPT

## 2023-10-04 PROCEDURE — 99349 HOME/RES VST EST MOD MDM 40: CPT

## 2023-10-06 ENCOUNTER — APPOINTMENT (OUTPATIENT)
Dept: HOME HEALTH SERVICES | Facility: HOME HEALTH | Age: 63
End: 2023-10-06

## 2023-10-06 DIAGNOSIS — M86.9 OSTEOMYELITIS, UNSPECIFIED: ICD-10-CM

## 2023-10-06 DIAGNOSIS — Z98.49 CATARACT EXTRACTION STATUS, UNSPECIFIED EYE: ICD-10-CM

## 2023-10-06 DIAGNOSIS — M51.17 INTERVERTEBRAL DISC DISORDERS WITH RADICULOPATHY, LUMBOSACRAL REGION: ICD-10-CM

## 2023-10-06 DIAGNOSIS — K21.9 GASTRO-ESOPHAGEAL REFLUX DISEASE WITHOUT ESOPHAGITIS: ICD-10-CM

## 2023-10-06 DIAGNOSIS — A41.9 SEPSIS, UNSPECIFIED ORGANISM: ICD-10-CM

## 2023-10-06 DIAGNOSIS — Z79.4 LONG TERM (CURRENT) USE OF INSULIN: ICD-10-CM

## 2023-10-06 DIAGNOSIS — E11.65 TYPE 2 DIABETES MELLITUS WITH HYPERGLYCEMIA: ICD-10-CM

## 2023-10-06 DIAGNOSIS — R07.89 OTHER CHEST PAIN: ICD-10-CM

## 2023-10-06 DIAGNOSIS — F32.9 MAJOR DEPRESSIVE DISORDER, SINGLE EPISODE, UNSPECIFIED: ICD-10-CM

## 2023-10-06 DIAGNOSIS — L97.519 NON-PRESSURE CHRONIC ULCER OF OTHER PART OF RIGHT FOOT WITH UNSPECIFIED SEVERITY: ICD-10-CM

## 2023-10-06 DIAGNOSIS — Z89.421 ACQUIRED ABSENCE OF OTHER RIGHT TOE(S): ICD-10-CM

## 2023-10-06 DIAGNOSIS — Z79.02 LONG TERM (CURRENT) USE OF ANTITHROMBOTICS/ANTIPLATELETS: ICD-10-CM

## 2023-10-06 DIAGNOSIS — Z79.82 LONG TERM (CURRENT) USE OF ASPIRIN: ICD-10-CM

## 2023-10-06 DIAGNOSIS — I10 ESSENTIAL (PRIMARY) HYPERTENSION: ICD-10-CM

## 2023-10-06 DIAGNOSIS — Z90.49 ACQUIRED ABSENCE OF OTHER SPECIFIED PARTS OF DIGESTIVE TRACT: ICD-10-CM

## 2023-10-06 DIAGNOSIS — Z86.73 PERSONAL HISTORY OF TRANSIENT ISCHEMIC ATTACK (TIA), AND CEREBRAL INFARCTION WITHOUT RESIDUAL DEFICITS: ICD-10-CM

## 2023-10-06 DIAGNOSIS — I25.10 ATHEROSCLEROTIC HEART DISEASE OF NATIVE CORONARY ARTERY WITHOUT ANGINA PECTORIS: ICD-10-CM

## 2023-10-06 DIAGNOSIS — J44.9 CHRONIC OBSTRUCTIVE PULMONARY DISEASE, UNSPECIFIED: ICD-10-CM

## 2023-10-06 DIAGNOSIS — E11.622 TYPE 2 DIABETES MELLITUS WITH OTHER SKIN ULCER: ICD-10-CM

## 2023-10-06 DIAGNOSIS — G47.00 INSOMNIA, UNSPECIFIED: ICD-10-CM

## 2023-10-06 DIAGNOSIS — E11.69 TYPE 2 DIABETES MELLITUS WITH OTHER SPECIFIED COMPLICATION: ICD-10-CM

## 2023-10-06 DIAGNOSIS — F02.A0 DEMENTIA IN OTHER DISEASES CLASSIFIED ELSEWHERE, MILD, WITHOUT BEHAVIORAL DISTURBANCE, PSYCHOTIC DISTURBANCE, MOOD DISTURBANCE, AND ANXIETY: ICD-10-CM

## 2023-10-06 DIAGNOSIS — I25.2 OLD MYOCARDIAL INFARCTION: ICD-10-CM

## 2023-10-06 DIAGNOSIS — G20.A1 PARKINSON'S DISEASE WITHOUT DYSKINESIA, WITHOUT MENTION OF FLUCTUATIONS: ICD-10-CM

## 2023-10-06 DIAGNOSIS — E11.42 TYPE 2 DIABETES MELLITUS WITH DIABETIC POLYNEUROPATHY: ICD-10-CM

## 2023-10-06 DIAGNOSIS — E11.51 TYPE 2 DIABETES MELLITUS WITH DIABETIC PERIPHERAL ANGIOPATHY WITHOUT GANGRENE: ICD-10-CM

## 2023-10-06 DIAGNOSIS — Z95.5 PRESENCE OF CORONARY ANGIOPLASTY IMPLANT AND GRAFT: ICD-10-CM

## 2023-10-06 DIAGNOSIS — F41.9 ANXIETY DISORDER, UNSPECIFIED: ICD-10-CM

## 2023-10-06 DIAGNOSIS — Z87.891 PERSONAL HISTORY OF NICOTINE DEPENDENCE: ICD-10-CM

## 2023-10-12 LAB
SURGICAL PATHOLOGY STUDY: SIGNIFICANT CHANGE UP

## 2023-10-24 ENCOUNTER — APPOINTMENT (OUTPATIENT)
Dept: DERMATOLOGY | Facility: CLINIC | Age: 63
End: 2023-10-24

## 2023-10-27 ENCOUNTER — LABORATORY RESULT (OUTPATIENT)
Age: 63
End: 2023-10-27

## 2023-10-27 ENCOUNTER — APPOINTMENT (OUTPATIENT)
Dept: DERMATOLOGY | Facility: CLINIC | Age: 63
End: 2023-10-27
Payer: MEDICARE

## 2023-10-27 PROCEDURE — 99204 OFFICE O/P NEW MOD 45 MIN: CPT

## 2023-10-31 ENCOUNTER — NON-APPOINTMENT (OUTPATIENT)
Age: 63
End: 2023-10-31

## 2023-11-01 ENCOUNTER — APPOINTMENT (OUTPATIENT)
Dept: DERMATOLOGY | Facility: CLINIC | Age: 63
End: 2023-11-01
Payer: MEDICARE

## 2023-11-01 PROCEDURE — 99213 OFFICE O/P EST LOW 20 MIN: CPT

## 2023-11-01 RX ORDER — CIPROFLOXACIN HYDROCHLORIDE 500 MG/1
500 TABLET, FILM COATED ORAL
Qty: 14 | Refills: 0 | Status: DISCONTINUED | COMMUNITY
Start: 2023-11-01 | End: 2023-11-01

## 2023-11-01 RX ORDER — HYDROCORTISONE 10 MG/G
1 CREAM TOPICAL
Qty: 1 | Refills: 0 | Status: DISCONTINUED | COMMUNITY
Start: 2023-08-23 | End: 2023-11-01

## 2023-11-02 ENCOUNTER — APPOINTMENT (OUTPATIENT)
Dept: ULTRASOUND IMAGING | Facility: CLINIC | Age: 63
End: 2023-11-02

## 2023-11-02 ENCOUNTER — RESULT REVIEW (OUTPATIENT)
Age: 63
End: 2023-11-02

## 2023-11-02 ENCOUNTER — NON-APPOINTMENT (OUTPATIENT)
Age: 63
End: 2023-11-02

## 2023-11-02 ENCOUNTER — OUTPATIENT (OUTPATIENT)
Dept: OUTPATIENT SERVICES | Facility: HOSPITAL | Age: 63
LOS: 1 days | End: 2023-11-02
Payer: MEDICARE

## 2023-11-02 DIAGNOSIS — Z90.49 ACQUIRED ABSENCE OF OTHER SPECIFIED PARTS OF DIGESTIVE TRACT: Chronic | ICD-10-CM

## 2023-11-02 DIAGNOSIS — Z90.89 ACQUIRED ABSENCE OF OTHER ORGANS: Chronic | ICD-10-CM

## 2023-11-02 DIAGNOSIS — Z98.890 OTHER SPECIFIED POSTPROCEDURAL STATES: Chronic | ICD-10-CM

## 2023-11-02 DIAGNOSIS — Z98.49 CATARACT EXTRACTION STATUS, UNSPECIFIED EYE: Chronic | ICD-10-CM

## 2023-11-02 PROCEDURE — 76536 US EXAM OF HEAD AND NECK: CPT | Mod: 26

## 2023-11-05 ENCOUNTER — NON-APPOINTMENT (OUTPATIENT)
Age: 63
End: 2023-11-05

## 2023-11-07 ENCOUNTER — NON-APPOINTMENT (OUTPATIENT)
Age: 63
End: 2023-11-07

## 2023-11-07 LAB
ALBUMIN SERPL ELPH-MCNC: 4 G/DL
ALP BLD-CCNC: 135 U/L
ALT SERPL-CCNC: 15 U/L
ANION GAP SERPL CALC-SCNC: 14 MMOL/L
AST SERPL-CCNC: 22 U/L
BASOPHILS # BLD AUTO: 0.09 K/UL
BASOPHILS NFR BLD AUTO: 0.8 %
BILIRUB SERPL-MCNC: 0.3 MG/DL
BUN SERPL-MCNC: 23 MG/DL
CALCIUM SERPL-MCNC: 10.1 MG/DL
CHLORIDE SERPL-SCNC: 102 MMOL/L
CO2 SERPL-SCNC: 23 MMOL/L
CREAT SERPL-MCNC: 0.94 MG/DL
EGFR: 69 ML/MIN/1.73M2
EOSINOPHIL # BLD AUTO: 0.41 K/UL
EOSINOPHIL NFR BLD AUTO: 3.8 %
GLUCOSE SERPL-MCNC: 161 MG/DL
HCT VFR BLD CALC: 40.5 %
HGB BLD-MCNC: 12.4 G/DL
IMM GRANULOCYTES NFR BLD AUTO: 0.6 %
LYMPHOCYTES # BLD AUTO: 2.48 K/UL
LYMPHOCYTES NFR BLD AUTO: 23.2 %
MAN DIFF?: NORMAL
MCHC RBC-ENTMCNC: 25.3 PG
MCHC RBC-ENTMCNC: 30.6 GM/DL
MCV RBC AUTO: 82.7 FL
MONOCYTES # BLD AUTO: 0.68 K/UL
MONOCYTES NFR BLD AUTO: 6.4 %
NEUTROPHILS # BLD AUTO: 6.98 K/UL
NEUTROPHILS NFR BLD AUTO: 65.2 %
PLATELET # BLD AUTO: 416 K/UL
POTASSIUM SERPL-SCNC: 4.5 MMOL/L
PROT SERPL-MCNC: 7.6 G/DL
RBC # BLD: 4.9 M/UL
RBC # FLD: 17.8 %
SODIUM SERPL-SCNC: 139 MMOL/L
WBC # FLD AUTO: 10.7 K/UL

## 2023-11-07 RX ORDER — METFORMIN HYDROCHLORIDE 850 MG/1
1 TABLET ORAL
Qty: 0 | Refills: 0 | DISCHARGE

## 2023-11-07 RX ORDER — ASPIRIN/CALCIUM CARB/MAGNESIUM 324 MG
1 TABLET ORAL
Qty: 0 | Refills: 0 | DISCHARGE

## 2023-11-07 RX ORDER — METOPROLOL TARTRATE 50 MG
1 TABLET ORAL
Refills: 0 | DISCHARGE

## 2023-11-07 RX ORDER — SERTRALINE 25 MG/1
1 TABLET, FILM COATED ORAL
Refills: 0 | DISCHARGE

## 2023-11-07 RX ORDER — PANTOPRAZOLE SODIUM 20 MG/1
1 TABLET, DELAYED RELEASE ORAL
Refills: 0 | DISCHARGE

## 2023-11-07 RX ORDER — OMEPRAZOLE 10 MG/1
1 CAPSULE, DELAYED RELEASE ORAL
Qty: 0 | Refills: 0 | DISCHARGE

## 2023-11-07 RX ORDER — ROSUVASTATIN CALCIUM 5 MG/1
1 TABLET ORAL
Refills: 0 | DISCHARGE

## 2023-11-07 RX ORDER — SERTRALINE 25 MG/1
1 TABLET, FILM COATED ORAL
Qty: 0 | Refills: 0 | DISCHARGE

## 2023-11-07 RX ORDER — BENAZEPRIL HYDROCHLORIDE AND HYDROCHLOROTHIAZIDE 10; 12.5 MG/1; MG/1
1 TABLET, FILM COATED ORAL
Refills: 0 | DISCHARGE

## 2023-11-07 RX ORDER — HYDROCHLOROTHIAZIDE 25 MG
1 TABLET ORAL
Qty: 0 | Refills: 0 | DISCHARGE

## 2023-11-07 RX ORDER — METOPROLOL TARTRATE 50 MG
1 TABLET ORAL
Qty: 0 | Refills: 0 | DISCHARGE

## 2023-11-07 RX ORDER — GABAPENTIN 400 MG/1
1 CAPSULE ORAL
Qty: 0 | Refills: 0 | DISCHARGE

## 2023-11-07 RX ORDER — INSULIN GLARGINE 100 [IU]/ML
1 INJECTION, SOLUTION SUBCUTANEOUS
Refills: 0 | DISCHARGE

## 2023-11-07 RX ORDER — ATORVASTATIN CALCIUM 80 MG/1
1 TABLET, FILM COATED ORAL
Qty: 0 | Refills: 0 | DISCHARGE

## 2023-11-07 RX ORDER — LIPASE/PROTEASE/AMYLASE 16-48-48K
2 CAPSULE,DELAYED RELEASE (ENTERIC COATED) ORAL
Qty: 0 | Refills: 0 | DISCHARGE

## 2023-11-07 RX ORDER — EMPAGLIFLOZIN, METFORMIN HYDROCHLORIDE 10; 1000 MG/1; MG/1
1 TABLET, EXTENDED RELEASE ORAL
Refills: 0 | DISCHARGE

## 2023-11-07 RX ORDER — DEXTROMETHORPHAN HYDROBROMIDE AND QUINIDINE SULFATE 20; 10 MG/1; MG/1
1 CAPSULE, GELATIN COATED ORAL
Qty: 0 | Refills: 0 | DISCHARGE

## 2023-11-07 RX ORDER — BUPROPION HYDROCHLORIDE 150 MG/1
1 TABLET, EXTENDED RELEASE ORAL
Refills: 0 | DISCHARGE

## 2023-11-08 LAB — BACTERIA BLD CULT: NORMAL

## 2023-11-28 ENCOUNTER — APPOINTMENT (OUTPATIENT)
Dept: NEUROLOGY | Facility: CLINIC | Age: 63
End: 2023-11-28

## 2023-11-28 ENCOUNTER — NON-APPOINTMENT (OUTPATIENT)
Age: 63
End: 2023-11-28

## 2023-12-01 ENCOUNTER — NON-APPOINTMENT (OUTPATIENT)
Age: 63
End: 2023-12-01

## 2023-12-02 ENCOUNTER — NON-APPOINTMENT (OUTPATIENT)
Age: 63
End: 2023-12-02

## 2023-12-06 ENCOUNTER — APPOINTMENT (OUTPATIENT)
Dept: HOME HEALTH SERVICES | Facility: HOME HEALTH | Age: 63
End: 2023-12-06
Payer: MEDICARE

## 2023-12-06 VITALS
DIASTOLIC BLOOD PRESSURE: 78 MMHG | HEART RATE: 93 BPM | OXYGEN SATURATION: 96 % | SYSTOLIC BLOOD PRESSURE: 134 MMHG | TEMPERATURE: 98.3 F | RESPIRATION RATE: 22 BRPM | HEIGHT: 64 IN

## 2023-12-06 DIAGNOSIS — Z23 ENCOUNTER FOR IMMUNIZATION: ICD-10-CM

## 2023-12-06 PROBLEM — G30.0 ALZHEIMER'S DISEASE WITH EARLY ONSET: Chronic | Status: ACTIVE | Noted: 2023-09-27

## 2023-12-06 PROBLEM — E11.9 TYPE 2 DIABETES MELLITUS WITHOUT COMPLICATIONS: Chronic | Status: ACTIVE | Noted: 2023-09-27

## 2023-12-06 PROBLEM — I21.9 ACUTE MYOCARDIAL INFARCTION, UNSPECIFIED: Chronic | Status: ACTIVE | Noted: 2023-09-27

## 2023-12-06 PROBLEM — I73.9 PERIPHERAL VASCULAR DISEASE, UNSPECIFIED: Chronic | Status: ACTIVE | Noted: 2023-09-27

## 2023-12-06 PROBLEM — I25.10 ATHEROSCLEROTIC HEART DISEASE OF NATIVE CORONARY ARTERY WITHOUT ANGINA PECTORIS: Chronic | Status: ACTIVE | Noted: 2023-09-27

## 2023-12-06 PROBLEM — J44.9 CHRONIC OBSTRUCTIVE PULMONARY DISEASE, UNSPECIFIED: Chronic | Status: ACTIVE | Noted: 2023-09-27

## 2023-12-06 PROBLEM — I10 ESSENTIAL (PRIMARY) HYPERTENSION: Chronic | Status: ACTIVE | Noted: 2023-09-27

## 2023-12-06 PROBLEM — I63.9 CEREBRAL INFARCTION, UNSPECIFIED: Chronic | Status: ACTIVE | Noted: 2023-09-27

## 2023-12-06 PROBLEM — S98.139A: Chronic | Status: ACTIVE | Noted: 2023-09-27

## 2023-12-06 PROCEDURE — 99349 HOME/RES VST EST MOD MDM 40: CPT | Mod: 25

## 2023-12-12 ENCOUNTER — APPOINTMENT (OUTPATIENT)
Dept: DERMATOLOGY | Facility: CLINIC | Age: 63
End: 2023-12-12

## 2023-12-20 ENCOUNTER — NON-APPOINTMENT (OUTPATIENT)
Age: 63
End: 2023-12-20

## 2023-12-22 ENCOUNTER — APPOINTMENT (OUTPATIENT)
Dept: DERMATOLOGY | Facility: CLINIC | Age: 63
End: 2023-12-22

## 2024-01-12 ENCOUNTER — APPOINTMENT (OUTPATIENT)
Dept: DERMATOLOGY | Facility: CLINIC | Age: 64
End: 2024-01-12

## 2024-01-19 ENCOUNTER — APPOINTMENT (OUTPATIENT)
Dept: BREAST CENTER | Facility: CLINIC | Age: 64
End: 2024-01-19

## 2024-02-07 ENCOUNTER — APPOINTMENT (OUTPATIENT)
Dept: HOME HEALTH SERVICES | Facility: HOME HEALTH | Age: 64
End: 2024-02-07

## 2024-02-12 ENCOUNTER — APPOINTMENT (OUTPATIENT)
Dept: HOME HEALTH SERVICES | Facility: HOME HEALTH | Age: 64
End: 2024-02-12

## 2024-02-12 ENCOUNTER — NON-APPOINTMENT (OUTPATIENT)
Age: 64
End: 2024-02-12

## 2024-02-13 LAB
25(OH)D3 SERPL-MCNC: 24.3 NG/ML
ALBUMIN SERPL ELPH-MCNC: 3.8 G/DL
ALP BLD-CCNC: 124 U/L
ALT SERPL-CCNC: 13 U/L
ANION GAP SERPL CALC-SCNC: 13 MMOL/L
AST SERPL-CCNC: 13 U/L
BASOPHILS # BLD AUTO: 0.1 K/UL
BASOPHILS NFR BLD AUTO: 0.8 %
BILIRUB SERPL-MCNC: 0.2 MG/DL
BUN SERPL-MCNC: 21 MG/DL
CALCIUM SERPL-MCNC: 9.6 MG/DL
CHLORIDE SERPL-SCNC: 103 MMOL/L
CHOLEST SERPL-MCNC: 121 MG/DL
CO2 SERPL-SCNC: 22 MMOL/L
CREAT SERPL-MCNC: 1.02 MG/DL
EGFR: 62 ML/MIN/1.73M2
EOSINOPHIL # BLD AUTO: 0.37 K/UL
EOSINOPHIL NFR BLD AUTO: 3.1 %
ESTIMATED AVERAGE GLUCOSE: 194 MG/DL
GLUCOSE SERPL-MCNC: 175 MG/DL
HBA1C MFR BLD HPLC: 8.4 %
HCT VFR BLD CALC: 33.7 %
HDLC SERPL-MCNC: 31 MG/DL
HGB BLD-MCNC: 10.6 G/DL
IMM GRANULOCYTES NFR BLD AUTO: 0.5 %
LDLC SERPL CALC-MCNC: 48 MG/DL
LYMPHOCYTES # BLD AUTO: 3.14 K/UL
LYMPHOCYTES NFR BLD AUTO: 26.5 %
MAN DIFF?: NORMAL
MCHC RBC-ENTMCNC: 24.5 PG
MCHC RBC-ENTMCNC: 31.5 GM/DL
MCV RBC AUTO: 78 FL
MONOCYTES # BLD AUTO: 0.85 K/UL
MONOCYTES NFR BLD AUTO: 7.2 %
NEUTROPHILS # BLD AUTO: 7.33 K/UL
NEUTROPHILS NFR BLD AUTO: 61.9 %
NONHDLC SERPL-MCNC: 90 MG/DL
PLATELET # BLD AUTO: 384 K/UL
POTASSIUM SERPL-SCNC: 4.5 MMOL/L
PROT SERPL-MCNC: 6.7 G/DL
RBC # BLD: 4.32 M/UL
RBC # FLD: 16.9 %
SODIUM SERPL-SCNC: 138 MMOL/L
TRIGL SERPL-MCNC: 271 MG/DL
WBC # FLD AUTO: 11.85 K/UL

## 2024-02-14 ENCOUNTER — APPOINTMENT (OUTPATIENT)
Dept: DERMATOLOGY | Facility: CLINIC | Age: 64
End: 2024-02-14

## 2024-02-14 ENCOUNTER — LABORATORY RESULT (OUTPATIENT)
Age: 64
End: 2024-02-14

## 2024-02-15 ENCOUNTER — APPOINTMENT (OUTPATIENT)
Dept: DERMATOLOGY | Facility: CLINIC | Age: 64
End: 2024-02-15
Payer: MEDICARE

## 2024-02-15 PROCEDURE — 99214 OFFICE O/P EST MOD 30 MIN: CPT

## 2024-02-15 RX ORDER — GENTAMICIN SULFATE 1 MG/G
0.1 OINTMENT TOPICAL 4 TIMES DAILY
Qty: 1 | Refills: 0 | Status: DISCONTINUED | COMMUNITY
Start: 2023-11-01 | End: 2024-02-15

## 2024-02-15 NOTE — ASSESSMENT
[FreeTextEntry1] : #MRSA and pseudomonal cellulitis -  #T2DM Resolved s/p doxycycline, mupirocin ointment, gentamicin ointment.  d/c gentamicin   #Cystic acne vulgaris #Scarring  -BP 10% wash in AM  -start tretinoin 0.025 cream TIW and nighttime followed by moisturizer  -continue clindamycin 1% solution BID as needed for flares  -education provided, discussed the importance of healthy balanced diet and smoking cessation for good wound healing.   #Furunculosis - abdomen  -BP 10% wash in shower 3 times weekly -For flares- apply Mupirocin ointment to active bumps TID for 7 days -instructed to avoid picking and scratching at lesions to avoid worsening   RTC if there is no improvement after 2-3 months or sooner as needed.

## 2024-02-15 NOTE — PHYSICAL EXAM
[FreeTextEntry3] : cheeks, chin, lower abdomen, abdominal pannus- many scattered hyperpigmented atrophic papules and macules, some scaly hyperkeratotic papules. on the lower abdomen, there are many atrophic boxcar- like cicatricial papules

## 2024-02-15 NOTE — HISTORY OF PRESENT ILLNESS
[FreeTextEntry1] : Follow up MRSA cellulitis, prurigo nodules  [de-identified] : Ms. Dee is a 64yo F follow up for the above.  -Feels her skin is doing much better and the abscess resolved.  -Currently using "all her prescription" topicals on her face but unsure of which ones. Would like recs for the scarring and hyperpigmentation from the prior lesions.  -Concerned about history of bumps on her lower abdomen. Have improved since she completed the course of doxycycline. Not currently using anything.   PMH:  MRSA infections T2D Active smoker

## 2024-02-16 ENCOUNTER — LABORATORY RESULT (OUTPATIENT)
Age: 64
End: 2024-02-16

## 2024-02-22 ENCOUNTER — RX RENEWAL (OUTPATIENT)
Age: 64
End: 2024-02-22

## 2024-02-22 RX ORDER — ERGOCALCIFEROL 1.25 MG/1
1.25 MG CAPSULE, LIQUID FILLED ORAL
Qty: 6 | Refills: 0 | Status: ACTIVE | COMMUNITY
Start: 2023-08-30 | End: 1900-01-01

## 2024-02-23 ENCOUNTER — APPOINTMENT (OUTPATIENT)
Dept: HOME HEALTH SERVICES | Facility: HOME HEALTH | Age: 64
End: 2024-02-23
Payer: MEDICARE

## 2024-02-23 VITALS
RESPIRATION RATE: 20 BRPM | OXYGEN SATURATION: 95 % | SYSTOLIC BLOOD PRESSURE: 126 MMHG | TEMPERATURE: 97.9 F | DIASTOLIC BLOOD PRESSURE: 72 MMHG | HEIGHT: 64 IN | HEART RATE: 82 BPM

## 2024-02-23 DIAGNOSIS — D50.8 OTHER IRON DEFICIENCY ANEMIAS: ICD-10-CM

## 2024-02-23 PROCEDURE — 99349 HOME/RES VST EST MOD MDM 40: CPT

## 2024-02-23 RX ORDER — SULFAMETHOXAZOLE AND TRIMETHOPRIM 800; 160 MG/1; MG/1
800-160 TABLET ORAL
Qty: 20 | Refills: 0 | Status: DISCONTINUED | COMMUNITY
Start: 2023-10-04 | End: 2024-02-23

## 2024-02-23 RX ORDER — DOXYCYCLINE HYCLATE 100 MG/1
100 CAPSULE ORAL
Qty: 42 | Refills: 0 | Status: DISCONTINUED | COMMUNITY
Start: 2023-10-27 | End: 2024-02-23

## 2024-02-23 RX ORDER — LORATADINE 5 MG/5 ML
0.05 SOLUTION, ORAL ORAL TWICE DAILY
Qty: 1 | Refills: 0 | Status: DISCONTINUED | COMMUNITY
Start: 2023-12-06 | End: 2024-02-23

## 2024-02-23 RX ORDER — CLINDAMYCIN PHOSPHATE 10 MG/ML
1 SOLUTION TOPICAL
Qty: 1 | Refills: 1 | Status: DISCONTINUED | COMMUNITY
Start: 2023-10-27 | End: 2024-02-23

## 2024-02-23 RX ORDER — ALBUTEROL SULFATE 90 UG/1
108 (90 BASE) INHALANT RESPIRATORY (INHALATION)
Qty: 1 | Refills: 3 | Status: ACTIVE | COMMUNITY
Start: 2024-02-23 | End: 1900-01-01

## 2024-02-23 RX ORDER — CEPHALEXIN 500 MG/1
500 CAPSULE ORAL
Qty: 20 | Refills: 0 | Status: DISCONTINUED | COMMUNITY
Start: 2023-10-04 | End: 2024-02-23

## 2024-02-23 NOTE — CURRENT MEDS
[Medication and Allergies Reconciled] : medication and allergies reconciled [High Risk Medications Reviewed and Reconciled (Beers Criteria)] : high risk medications reviewed and reconciled [Reviewed patient reported medication adherence from Comprehensive Assessment] : Reviewed patient reported medication adherence from comprehensive assessment [Non adherent to medications] : Patient is non adherent to medications as prescribed

## 2024-02-27 NOTE — REASON FOR VISIT
[Follow-Up] : a follow-up visit [Family Member] : family member [Other: _____] : [unfilled] [Pre-Visit Preparation] : pre-visit preparation was done [Intercurrent Specialty/Sub-specialty Visits] : the patient has intercurrent specialty/sub-specialty visits [FreeTextEntry1] : Type 2 Diabetes on insulin with complications, Hypertension, Hyperlipidemia, CAD s/p 2 stents, Mass on Right Kidney, 7 TIAs,    2 MIs, Major Depressive disorder, Smoker [FreeTextEntry2] : chart reviewed [FreeTextEntry3] : Cardiology, Pain Management, Psychiatry, Neurology

## 2024-02-27 NOTE — HEALTH RISK ASSESSMENT
[HRA Reviewed] : Health risk assessment reviewed [Independent] : managing finances [Any fall with injury in past year] : Patient reported fall with injury in the past year [No] : The patient does not have visual impairment [TimeGetUpGo] : 0 [de-identified] : fully ambulatory

## 2024-02-27 NOTE — COUNSELING
[Obese -  ( BMI  >29.9 )] : obese - ( BMI  >29.9 ) [DASH diet recommended] : DASH diet recommended [Sodium restriction 2gm recommended] : sodium restriction 2 gm recommended [Smoker, interested in quitting; smoking cessation counseling given (5 mins) and resources provided] : smoker, interested in quitting; smoking cessation counseling given (5 minutes) and resources provided [Use grab bars] : use grab bars [Smoke/CO Detectors] : smoke/CO detectors [Use assistive device to avoid falls] : use assistive device to avoid falls [Remove clutter and unsafe carpeting to avoid falls] : remove clutter and unsafe carpeting to avoid falls [] : eye exam [Completed] : Aspirin use discussion completed [Patient not on disease-modifying anti-rheumatic drug due to overall prognosis] : Patient not on disease-modifying anti-rheumatic drug due to overall prognosis [Improve weight] : improve weight [Decrease stress] : decrease stress [Minimize unnecessary interventions] : minimize unnecessary interventions [Decrease hospital use] : decrease hospital use [Discussed disease trajectory with patient/caregiver] : discussed disease trajectory with patient/caregiver [Advanced Directives discussed: ____] : Advanced directives discussed: [unfilled] [Full Code] : Code Status: Full Code [Annual Discussion and review of: ___] : Annual discussion and review of [unfilled] [No Limitations] : Treatment Guidelines: No limitations [Long Term Intubation] : Intubation: Long term intubation [FreeTextEntry4] : reports cut down to 1 and 1/2 pack, use to smoke 2 packs a day

## 2024-02-27 NOTE — CHRONIC CARE ASSESSMENT
[Less than 3 Times Per Week] : exercises less than 3 times per week [< 30 Minutes/Session] : (< 30 minutes per session) [Walking] : walking [Aerobic Conditioning] : aerobic conditioning [Diabetic Diet] : diabetic [Low Fat Diet] : low fat [Low Carb Diet] : low carbohydrate [Low Salt Diet] : low salt [General Adherence] : and is generally adherent [PPS Score: ____] : Palliative Performance Scale (PPS) Score: [unfilled] [de-identified] : brain aneurysm, DM2, extensive cardiac  [de-identified] : walks as tolerated [de-identified] : ADA, low fat, low sodium- but patient is non adherent

## 2024-02-27 NOTE — HISTORY OF PRESENT ILLNESS
[House Calls Co-Management Patient] : [unfilled] is a House Calls co-management patient [Patient is stable] : patient is stable [Education provided] : education provided [Patient] : patient [Family Member] : family member [LastPCPVisitDate] : 01/24 [FreeTextEntry4] : pain management, cardiology, neurology, psychiatric   [FreeTextEntry1] : Not homebound [FreeTextEntry2] : 02/23/2024 COVID SCREEN: Patient or caretaker denies fever, cough, trouble breathing, rash, vomiting. Patient has not been in close contact with anyone who is COVID-19 positive, or suspected of having COVID-19.  N95 mask, gloves, eye wear and gown (if indicated) used during visit: Yes.  Total face to face time with patient is 45 min.  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  Patient's last hospitalization was at [facility] on 12/01/23 for stroke like symptoms. Neurological w/u, found to have 8 mm brain aneurysm. Patient reports that during spinal tap procedure she was dropped from the table, did not sustain injury. Patient was discharge home 12/02/23 as per hospital discharge documents.   Since hospitalization patient reports back to baseline. Patient denies chest pain, palpitations, distress, dizziness, headache and n/v.  Interim: Patient is schedule for angiogram on 2/29/24. Today's visit was for surgical clearance. Require documents to neurology is emailed to patient.     Patient/ patient's caregiver reports no weight loss >10 lbs in the past 6 months. No changes in dentition or swallowing reported, No changes in hearing or vision reported. No changes in Cognition reported. Patient reports depression but denies any suicidal or homicidal ideation. Patient is ADL independent and IADL independent. No changes in gait. Reports of falls with no injury months ago.  Patient's home environment is safe.   Goals of care discussed 10mins. DNR/DNI, limitation.

## 2024-02-27 NOTE — PHYSICAL EXAM
[No Acute Distress] : no acute distress [Normal Voice/Communication] : normal voice communication [Normal Sclera/Conjunctiva] : normal sclera/conjunctiva [EOMI] : extra ocular movement intact [Normal Outer Ear/Nose] : the ears and nose were normal in appearance [Normal TMs] : both tympanic membranes were normal [No JVD] : no jugular venous distention [No LAD] : no lymphadenopathy [No Respiratory Distress] : no respiratory distress [Clear to Auscultation] : lungs were clear to auscultation bilaterally [No Accessory Muscle Use] : no accessory muscle use [Normal Rate] : heart rate was normal  [Regular Rhythm] : with a regular rhythm [Normal S1, S2] : normal S1 and S2 [No Murmurs] : no murmurs heard [No Edema] : there was no peripheral edema [Breast Exam Declined] : patient declined to have breast exam done [Normal Bowel Sounds] : normal bowel sounds [Non Tender] : non-tender [Soft] : abdomen soft [Not Distended] : not distended [Patient Refused] : rectal exam was refused by the patient [Normal Post Cervical Nodes] : no posterior cervical lymphadenopathy [Normal Anterior Cervical Nodes] : no anterior cervical lymphadenopathy [No CVA Tenderness] : no ~M costovertebral angle tenderness [No Joint Swelling] : no joint swelling seen [Cranial Nerves Intact] : cranial nerves 2-12 were intact [Normal Reflexes] : deep tendon reflexes were 2+ and symmetric [Oriented x3] : oriented to person, place, and time [Kyphosis] : no kyphosis present [Scoliosis] : scoliosis not present [Over the Past 2 Weeks, Have You Felt Down, Depressed, or Hopeless?] : 1.) Over the past 2 weeks, have you felt down, depressed, or hopeless? No [Over the Past 2 Weeks, Have You Felt Little Interest or Pleasure Doing Things?] : 2.) Over the past 2 weeks, have you felt little interest or pleasure doing things? No [Foot Ulcers] : no foot ulcers [de-identified] : patient refused [de-identified] : unsteady gait [de-identified] : right great toe and third toes amputee [de-identified] : right great toe amputee

## 2024-03-01 ENCOUNTER — NON-APPOINTMENT (OUTPATIENT)
Age: 64
End: 2024-03-01

## 2024-03-06 ENCOUNTER — NON-APPOINTMENT (OUTPATIENT)
Age: 64
End: 2024-03-06

## 2024-03-08 ENCOUNTER — NON-APPOINTMENT (OUTPATIENT)
Age: 64
End: 2024-03-08

## 2024-03-08 NOTE — HISTORY OF PRESENT ILLNESS
[de-identified] : HAROON MONET is a 63 year-old female with a PMHx significant for CAD s/p stent x2, MI x2, TIAs x7, HTN, DM II, HLD, COPD with Asthma, Right kidney mass, Bipolar Disease, and Depression, who presents to Dr. Gale office today for initial evaluation of cerebral aneurysm.   Patient's was hospitalization was at [facility] on 12/01/23 for stroke like symptoms. Neurological w/u, found to have 8 mm brain aneurysm. Patient reports that during spinal tap procedure she was dropped from the table, did not sustain injury. Patient was discharge home 12/02/23 as per hospital discharge documents.   Since hospitalization patient reports back to baseline. Patient denies chest pain, palpitations, distress, dizziness, headache and n/v.  Interim: Patient is schedule for angiogram on 2/29/24. Today's visit was for surgical clearance. Require documents to neurology is emailed to patient.       PCP: JAMES LOCK   Neuro:

## 2024-03-12 ENCOUNTER — APPOINTMENT (OUTPATIENT)
Dept: NEUROSURGERY | Facility: CLINIC | Age: 64
End: 2024-03-12

## 2024-03-20 ENCOUNTER — APPOINTMENT (OUTPATIENT)
Dept: HEART AND VASCULAR | Facility: CLINIC | Age: 64
End: 2024-03-20

## 2024-03-20 NOTE — HISTORY OF PRESENT ILLNESS
[FreeTextEntry1] : 63F w  CAD s/p PCI 2 stents, PAD, DMT2, HTN, HLD, COPD, JOSETTE, smoker, multiple TIAs, right renal mass, psych disorder referred for CVD mgt

## 2024-04-04 ENCOUNTER — NON-APPOINTMENT (OUTPATIENT)
Age: 64
End: 2024-04-04

## 2024-04-04 ENCOUNTER — APPOINTMENT (OUTPATIENT)
Dept: HOME HEALTH SERVICES | Facility: HOME HEALTH | Age: 64
End: 2024-04-04
Payer: MEDICARE

## 2024-04-04 VITALS
OXYGEN SATURATION: 93 % | TEMPERATURE: 97.2 F | DIASTOLIC BLOOD PRESSURE: 80 MMHG | HEART RATE: 68 BPM | HEIGHT: 64 IN | SYSTOLIC BLOOD PRESSURE: 118 MMHG

## 2024-04-04 DIAGNOSIS — Z87.898 PERSONAL HISTORY OF OTHER SPECIFIED CONDITIONS: ICD-10-CM

## 2024-04-04 DIAGNOSIS — Z01.818 ENCOUNTER FOR OTHER PREPROCEDURAL EXAMINATION: ICD-10-CM

## 2024-04-04 DIAGNOSIS — L08.9 LOCAL INFECTION OF THE SKIN AND SUBCUTANEOUS TISSUE, UNSPECIFIED: ICD-10-CM

## 2024-04-04 DIAGNOSIS — R52 PAIN, UNSPECIFIED: ICD-10-CM

## 2024-04-04 DIAGNOSIS — R92.8 OTHER ABNORMAL AND INCONCLUSIVE FINDINGS ON DIAGNOSTIC IMAGING OF BREAST: ICD-10-CM

## 2024-04-04 DIAGNOSIS — K86.89 OTHER SPECIFIED DISEASES OF PANCREAS: ICD-10-CM

## 2024-04-04 DIAGNOSIS — Z89.421 ACQUIRED ABSENCE OF OTHER RIGHT TOE(S): ICD-10-CM

## 2024-04-04 DIAGNOSIS — Z20.822 CONTACT WITH AND (SUSPECTED) EXPOSURE TO COVID-19: ICD-10-CM

## 2024-04-04 DIAGNOSIS — N64.52 NIPPLE DISCHARGE: ICD-10-CM

## 2024-04-04 DIAGNOSIS — Z71.89 OTHER SPECIFIED COUNSELING: ICD-10-CM

## 2024-04-04 DIAGNOSIS — M79.605 DORSALGIA, UNSPECIFIED: ICD-10-CM

## 2024-04-04 DIAGNOSIS — Z87.2 PERSONAL HISTORY OF DISEASES OF THE SKIN AND SUBCUTANEOUS TISSUE: ICD-10-CM

## 2024-04-04 DIAGNOSIS — L03.221 CELLULITIS OF NECK: ICD-10-CM

## 2024-04-04 DIAGNOSIS — N64.4 MASTODYNIA: ICD-10-CM

## 2024-04-04 DIAGNOSIS — M54.9 DORSALGIA, UNSPECIFIED: ICD-10-CM

## 2024-04-04 DIAGNOSIS — L02.91 PERSONAL HISTORY OF DISEASES OF THE SKIN AND SUBCUTANEOUS TISSUE: ICD-10-CM

## 2024-04-04 DIAGNOSIS — R21 RASH AND OTHER NONSPECIFIC SKIN ERUPTION: ICD-10-CM

## 2024-04-04 DIAGNOSIS — Z86.69 PERSONAL HISTORY OF OTHER DISEASES OF THE NERVOUS SYSTEM AND SENSE ORGANS: ICD-10-CM

## 2024-04-04 PROCEDURE — 99349 HOME/RES VST EST MOD MDM 40: CPT

## 2024-04-04 RX ORDER — ACETAMINOPHEN 500 MG/1
500 TABLET, COATED ORAL EVERY 8 HOURS
Qty: 180 | Refills: 1 | Status: ACTIVE | COMMUNITY
Start: 2023-12-06 | End: 1900-01-01

## 2024-04-04 RX ORDER — LIDOCAINE 5% 700 MG/1
5 PATCH TOPICAL
Qty: 60 | Refills: 0 | Status: ACTIVE | COMMUNITY
Start: 2023-07-20 | End: 1900-01-01

## 2024-04-04 RX ORDER — HYDROCORTISONE 25 MG/G
2.5 OINTMENT TOPICAL
Qty: 1 | Refills: 1 | Status: DISCONTINUED | COMMUNITY
Start: 2023-10-27 | End: 2024-04-04

## 2024-04-04 NOTE — CURRENT MEDS
[Non adherent to medications] : Patient is non adherent to medications as prescribed [Medication and Allergies Reconciled] : medication and allergies reconciled [High Risk Medications Reviewed and Reconciled (Beers Criteria)] : high risk medications reviewed and reconciled [Reviewed patient reported medication adherence from Comprehensive Assessment] : Reviewed patient reported medication adherence from comprehensive assessment

## 2024-04-06 NOTE — CHRONIC CARE ASSESSMENT
[Inadequate social support] : inadequate social support [Less than 3 Times Per Week] : exercises less than 3 times per week [< 30 Minutes/Session] : (< 30 minutes per session) [Walking] : walking [Diabetic Diet] : diabetic [Low Fat Diet] : low fat [Low Carb Diet] : low carbohydrate [Low Salt Diet] : low salt [General Adherence] : and is generally adherent [PPS Score: ____] : Palliative Performance Scale (PPS) Score: [unfilled] [de-identified] : brain aneurysm, DM2, 2 MIs, Major Depressive disorder, Smoker, JOSETTE, COPD with asthma, Bipolar, Arthritis [de-identified] : walks as tolerated [de-identified] : ADA, low fat, low sodium- but patient is non adherent

## 2024-04-06 NOTE — HEALTH RISK ASSESSMENT
[HRA Reviewed] : Health risk assessment reviewed [Independent] : managing finances [Some assistance needed] : preparing meals [Any fall with injury in past year] : Patient reported fall with injury in the past year [No] : The patient does not have visual impairment [TimeGetUpGo] : 0 [de-identified] : fully ambulatory, did not participate due to sleeping

## 2024-04-06 NOTE — COUNSELING
[] : foot exam [Completed] : Aspirin use discussion completed [No Limitations] : Treatment Guidelines: No limitations [Obese -  ( BMI  >29.9 )] : obese - ( BMI  >29.9 ) [DASH diet recommended] : DASH diet recommended [Sodium restriction 2gm recommended] : sodium restriction 2 gm recommended [Smoker, interested in quitting; smoking cessation counseling given (5 mins) and resources provided] : smoker, interested in quitting; smoking cessation counseling given (5 minutes) and resources provided [Smoke/CO Detectors] : smoke/CO detectors [Use grab bars] : use grab bars [Use assistive device to avoid falls] : use assistive device to avoid falls [Remove clutter and unsafe carpeting to avoid falls] : remove clutter and unsafe carpeting to avoid falls [Patient not on disease-modifying anti-rheumatic drug due to overall prognosis] : Patient not on disease-modifying anti-rheumatic drug due to overall prognosis [Improve weight] : improve weight [Improve pain control] : improve pain control [Decrease stress] : decrease stress [Decrease hospital use] : decrease hospital use [Minimize unnecessary interventions] : minimize unnecessary interventions [Discussed disease trajectory with patient/caregiver] : discussed disease trajectory with patient/caregiver [Advanced Directives discussed: ____] : Advanced directives discussed: [unfilled] [Annual Discussion and review of: ___] : Annual discussion and review of [unfilled] [DNR] : Code Status: DNR [DNI] : Intubation: DNI [FreeTextEntry4] : reports cut down to 1 and 1/2 pack, use to smoke 2 packs a day

## 2024-04-06 NOTE — REASON FOR VISIT
[Follow-Up] : a follow-up visit [Other: _____] : [unfilled] [Pre-Visit Preparation] : pre-visit preparation was done [Intercurrent Specialty/Sub-specialty Visits] : the patient has intercurrent specialty/sub-specialty visits [FreeTextEntry1] : Type 2 Diabetes on insulin with complications, CAD s/p 2 stents, Mass on Right Kidney, 7 TIAs, 2 MIs, Major Depressive  disorder, tobacco smoker, JOSETTE, COPD with asthma, Bipolar, Arthritis, brain aneurysm  [FreeTextEntry2] : chart reviewed [FreeTextEntry3] : Cardiology, Pain Management, Psychiatry, Neurology

## 2024-04-06 NOTE — HISTORY OF PRESENT ILLNESS
[House Calls Co-Management Patient] : [unfilled] is a House Calls co-management patient [Referred] : has been referred for aide services [Patient is stable] : patient is stable [Education provided] : education provided [Patient] : patient [LastPCPVisitDate] : 01/24 [FreeTextEntry4] : pain management, cardiology, neurology, psychiatric   [FreeTextEntry2] : 04/04/2024 COVID SCREEN: Patient or caretaker denies fever, cough, trouble breathing, rash, vomiting. Patient has not been in close contact with anyone who is COVID-19 positive, or suspected of having COVID-19.  N95 mask, gloves, eye wear and gown (if indicated) used during visit: Yes.  Total face to face time with patient is 45 min.  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  Patient's last hospitalization was at [facility] on 12/01/23 for stroke like symptoms. Neurological w/u, found to have 8 mm brain aneurysm. Patient reports that during spinal tap procedure she was dropped from the table, did not sustain injury. Patient was discharge home 12/02/23 as per hospital discharge documents. Patient reports kiarra at  baseline. Reports chronic b/l shoulder pain. Patient denies chest pain, palpitations, distress, dizziness, headache and n/v.  Interim: Patient reports angiogram procedure is still pending due to concern about surgical clearance. Also, patient requested for new psychiatrist.     Patient/ patient's caregiver reports no weight loss >10 lbs in the past 6 months. No changes in dentition or swallowing reported, No changes in hearing or vision reported. No changes in Cognition reported. Patient reports depression but denies any suicidal or homicidal ideation. Patient is ADL independent and IADL independent. No changes in gait. Reports of falls with no injury months ago.  Patient's home environment is safe.   Goals of care discussed 10mins. DNR/DNI, limitation.    [FreeTextEntry1] : Not homebound

## 2024-04-06 NOTE — REVIEW OF SYSTEMS
[Joint Pain] : joint pain [Unsteady Walking] : ataxia [Negative] : Heme/Lymph [Joint Stiffness] : no joint stiffness [Joint Swelling] : no joint swelling [Muscle Weakness] : no muscle weakness [Muscle Pain] : no muscle pain [Back Pain] : no back pain [Itching] : no itching [Mole Changes] : no mole changes [Nail Changes] : no nail changes [Hair Changes] : no hair changes [Headache] : no headache [Skin Rash] : no skin rash [Dizziness] : no dizziness [Fainting] : no fainting [Confusion] : no confusion [Memory Loss] : no memory loss [FreeTextEntry9] : left shoulder pain [de-identified] : right great toe and third toe

## 2024-04-06 NOTE — PHYSICAL EXAM
[No Joint Swelling] : no joint swelling seen [Normal Reflexes] : deep tendon reflexes were 2+ and symmetric [No Acute Distress] : no acute distress [Normal Voice/Communication] : normal voice communication [Normal Sclera/Conjunctiva] : normal sclera/conjunctiva [EOMI] : extra ocular movement intact [Normal Outer Ear/Nose] : the ears and nose were normal in appearance [Normal TMs] : both tympanic membranes were normal [No JVD] : no jugular venous distention [No LAD] : no lymphadenopathy [No Respiratory Distress] : no respiratory distress [Clear to Auscultation] : lungs were clear to auscultation bilaterally [No Accessory Muscle Use] : no accessory muscle use [Normal Rate] : heart rate was normal  [Regular Rhythm] : with a regular rhythm [Normal S1, S2] : normal S1 and S2 [No Murmurs] : no murmurs heard [No Edema] : there was no peripheral edema [Breast Exam Declined] : patient declined to have breast exam done [Normal Bowel Sounds] : normal bowel sounds [Non Tender] : non-tender [Soft] : abdomen soft [Not Distended] : not distended [Patient Refused] : rectal exam was refused by the patient [Normal Post Cervical Nodes] : no posterior cervical lymphadenopathy [Normal Anterior Cervical Nodes] : no anterior cervical lymphadenopathy [No CVA Tenderness] : no ~M costovertebral angle tenderness [Cranial Nerves Intact] : cranial nerves 2-12 were intact [Oriented x3] : oriented to person, place, and time [Foot Ulcers] : foot ulcers [Over the Past 2 Weeks, Have You Felt Down, Depressed, or Hopeless?] : 1.) Over the past 2 weeks, have you felt down, depressed, or hopeless? No [Over the Past 2 Weeks, Have You Felt Little Interest or Pleasure Doing Things?] : 2.) Over the past 2 weeks, have you felt little interest or pleasure doing things? No [de-identified] : obese [de-identified] : patient refused [de-identified] : unsteady gait [de-identified] : right great toe and third toes amputee [de-identified] : healed right great and 3rd toes amputee

## 2024-04-30 ENCOUNTER — NON-APPOINTMENT (OUTPATIENT)
Age: 64
End: 2024-04-30

## 2024-05-23 ENCOUNTER — RX RENEWAL (OUTPATIENT)
Age: 64
End: 2024-05-23

## 2024-05-23 RX ORDER — SEMAGLUTIDE 1.34 MG/ML
4 INJECTION, SOLUTION SUBCUTANEOUS
Qty: 6 | Refills: 2 | Status: ACTIVE | COMMUNITY
Start: 2023-04-26 | End: 1900-01-01

## 2024-06-25 ENCOUNTER — APPOINTMENT (OUTPATIENT)
Dept: HOME HEALTH SERVICES | Facility: HOME HEALTH | Age: 64
End: 2024-06-25
Payer: MEDICARE

## 2024-06-25 VITALS
OXYGEN SATURATION: 99 % | SYSTOLIC BLOOD PRESSURE: 126 MMHG | HEIGHT: 64 IN | TEMPERATURE: 98.3 F | DIASTOLIC BLOOD PRESSURE: 74 MMHG | HEART RATE: 73 BPM | RESPIRATION RATE: 20 BRPM

## 2024-06-25 DIAGNOSIS — J44.89 OTHER SPECIFIED CHRONIC OBSTRUCTIVE PULMONARY DISEASE: ICD-10-CM

## 2024-06-25 DIAGNOSIS — I25.119 ATHEROSCLEROTIC HEART DISEASE OF NATIVE CORONARY ARTERY WITH UNSPECIFIED ANGINA PECTORIS: ICD-10-CM

## 2024-06-25 DIAGNOSIS — M25.512 PAIN IN LEFT SHOULDER: ICD-10-CM

## 2024-06-25 DIAGNOSIS — E66.01 MORBID (SEVERE) OBESITY DUE TO EXCESS CALORIES: ICD-10-CM

## 2024-06-25 DIAGNOSIS — Z86.73 PERSONAL HISTORY OF TRANSIENT ISCHEMIC ATTACK (TIA), AND CEREBRAL INFARCTION W/OUT RESIDUAL DEFICITS: ICD-10-CM

## 2024-06-25 DIAGNOSIS — M19.90 UNSPECIFIED OSTEOARTHRITIS, UNSPECIFIED SITE: ICD-10-CM

## 2024-06-25 DIAGNOSIS — Z87.2 PERSONAL HISTORY OF DISEASES OF THE SKIN AND SUBCUTANEOUS TISSUE: ICD-10-CM

## 2024-06-25 DIAGNOSIS — M48.061 SPINAL STENOSIS, LUMBAR REGION WITHOUT NEUROGENIC CLAUDICATION: ICD-10-CM

## 2024-06-25 DIAGNOSIS — E11.42 TYPE 2 DIABETES MELLITUS WITH DIABETIC POLYNEUROPATHY: ICD-10-CM

## 2024-06-25 DIAGNOSIS — I72.9 ANEURYSM OF UNSPECIFIED SITE: ICD-10-CM

## 2024-06-25 DIAGNOSIS — I73.9 PERIPHERAL VASCULAR DISEASE, UNSPECIFIED: ICD-10-CM

## 2024-06-25 DIAGNOSIS — Z72.0 TOBACCO USE: ICD-10-CM

## 2024-06-25 DIAGNOSIS — F31.9 BIPOLAR DISORDER, UNSPECIFIED: ICD-10-CM

## 2024-06-25 DIAGNOSIS — E11.8 TYPE 2 DIABETES MELLITUS WITH UNSPECIFIED COMPLICATIONS: ICD-10-CM

## 2024-06-25 PROCEDURE — 99349 HOME/RES VST EST MOD MDM 40: CPT

## 2024-07-02 ENCOUNTER — APPOINTMENT (OUTPATIENT)
Dept: DERMATOLOGY | Facility: CLINIC | Age: 64
End: 2024-07-02
Payer: MEDICARE

## 2024-07-02 DIAGNOSIS — B00.1 HERPESVIRAL VESICULAR DERMATITIS: ICD-10-CM

## 2024-07-02 DIAGNOSIS — L70.0 ACNE VULGARIS: ICD-10-CM

## 2024-07-02 PROCEDURE — 99214 OFFICE O/P EST MOD 30 MIN: CPT

## 2024-07-02 RX ORDER — VALACYCLOVIR 1 G/1
1 TABLET, FILM COATED ORAL
Qty: 4 | Refills: 0 | Status: ACTIVE | COMMUNITY
Start: 2024-07-02 | End: 1900-01-01

## 2024-07-02 RX ORDER — CLINDAMYCIN PHOSPHATE AND BENZOYL PEROXIDE 10; 50 MG/G; MG/G
1.2-5 GEL TOPICAL
Qty: 1 | Refills: 2 | Status: ACTIVE | COMMUNITY
Start: 2024-07-02 | End: 1900-01-01

## 2024-07-15 ENCOUNTER — NON-APPOINTMENT (OUTPATIENT)
Age: 64
End: 2024-07-15

## 2024-08-01 ENCOUNTER — APPOINTMENT (OUTPATIENT)
Dept: BREAST CENTER | Facility: CLINIC | Age: 64
End: 2024-08-01

## 2024-08-01 DIAGNOSIS — R92.8 OTHER ABNORMAL AND INCONCLUSIVE FINDINGS ON DIAGNOSTIC IMAGING OF BREAST: ICD-10-CM

## 2024-08-01 DIAGNOSIS — F31.9 BIPOLAR DISORDER, UNSPECIFIED: ICD-10-CM

## 2024-08-01 DIAGNOSIS — N64.52 NIPPLE DISCHARGE: ICD-10-CM

## 2024-08-01 PROCEDURE — 99441: CPT | Mod: 93

## 2024-08-01 NOTE — PAST MEDICAL HISTORY
[Postmenopausal] : The patient is postmenopausal [Menarche Age ____] : age at menarche was [unfilled] [Menopause Age____] : age at menopause was [unfilled] [Approximately ___] : the LMP was approximately [unfilled] [Total Preg ___] : G[unfilled] [Live Births ___] : P[unfilled]  [Living ___] : Living: [unfilled] [History of Hormone Replacement Treatment] : has no history of hormone replacement treatment

## 2024-08-01 NOTE — REVIEW OF SYSTEMS
[As Noted in HPI] : as noted in HPI [Negative] : Heme/Lymph [FreeTextEntry2] : night sweats [FreeTextEntry3] : right forehead lesion with redness and associated tenderness  [FreeTextEntry9] : muscle pain  [de-identified] : excessive thirst
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
## 2024-08-01 NOTE — ASSESSMENT
[FreeTextEntry1] : 63-year-old female, abnormal breast imaging, bilateral nipple discharge  #Abnormal breast imaging Reviewed with the patient the results of her bilateral diagnostic mammogram & US completed 11/28/2023, and the results were previously discussed with the patient; however, she did not proceed with the recommended biopsy and follow-up.  Reviewed the results of questionable mass in the right breast for which ultrasound-guided biopsy versus stereotactic biopsy was recommended as well as additional findings in the right breast for which a 6-month follow-up right diagnostic mammogram was recommended.  Discussed with the patient given she has not proceeded with the recommended biopsies for short interval follow-up imaging, recommend to proceed with a bilateral diagnostic mammogram & US for further evaluation at this time.    #Bilateral nipple discharge Reviewed with the patient given the new onset of bilateral possibly bloody nipple discharge reported recommendation to proceed with a bilateral diagnostic mammogram & US and follow-up in the office for physical exam.

## 2024-08-01 NOTE — REASON FOR VISIT
[Home] : at home, [unfilled] , at the time of the visit. [Medical Office: ([facility])___] : at the medical office located in  [Follow-Up: _____] : a [unfilled] follow-up visit

## 2024-08-01 NOTE — PLAN
[TextEntry] : Bilateral diagnostic mammogram & US now Follow-up in office in the next few weeks following completion of imaging for physical exam and review of results

## 2024-08-02 ENCOUNTER — NON-APPOINTMENT (OUTPATIENT)
Age: 64
End: 2024-08-02

## 2024-08-12 ENCOUNTER — RX RENEWAL (OUTPATIENT)
Age: 64
End: 2024-08-12

## 2024-08-12 RX ORDER — FERROUS SULFATE TAB 325 MG (65 MG ELEMENTAL FE) 325 (65 FE) MG
325 (65 FE) TAB ORAL DAILY
Qty: 30 | Refills: 4 | Status: ACTIVE | COMMUNITY
Start: 2024-08-12 | End: 1900-01-01

## 2024-09-25 ENCOUNTER — TRANSCRIPTION ENCOUNTER (OUTPATIENT)
Age: 64
End: 2024-09-25

## 2024-09-25 ENCOUNTER — NON-APPOINTMENT (OUTPATIENT)
Age: 64
End: 2024-09-25

## 2024-09-26 ENCOUNTER — APPOINTMENT (OUTPATIENT)
Dept: BREAST CENTER | Facility: CLINIC | Age: 64
End: 2024-09-26

## 2024-09-26 NOTE — HISTORY OF PRESENT ILLNESS
[FreeTextEntry1] : HAROON is a 63 year old female, who presents for telephonic follow-up normal breast imaging as patient has not been compliant with the recommended follow-up.  Of note the patient states that she has neglected her care as she states she "chooses her mother over herself".  The patient had previously been evaluated regarding white non-spontaneous discharge first noted over 5 years ago, a TSH and prolactin were drawn in August 2023 patient was found to have an elevated TSH she was recommended to follow-up with an endocrinologist, she underwent a consult in October 2023.  The patient underwent a bilateral diagnostic mammogram and ultrasound in November 2023 which identified a mass in the right breast for which ultrasound-guided biopsy versus mammogram biopsy was recommended as well as short interval follow-up imaging was recommended.  The patient has not undergone the recommended biopsy or follow-up imaging.  The patient states that she has still been experiencing bilateral white nipple discharge.  She states that she may have also had a bloody episode of nipple discharge bilaterally reports episode noted several weeks ago.  The patient denies any palpable abnormalities bilaterally, denies skin changes bilaterally.  History of left breast biopsies x 4 with reportedly benign findings. Patient has a history of left breast excisional biopsies Dr. Micah العراقي ([facility]) with reportedly benign pathology.   Of note, patient's mother has dementia and is staying with her mother to assist in providing caregiver support.   AVIS lifetime risk of 7.2%; Denies family history of breast or ovarian cancer   Patients medical history is significant for 2 MI's in 2012 and 2013 and 7 TIA's, DM II, COPD (current smoker), GERD, bipolar depression, h/o abuse, right toe amputation. Patient does not have a primary care provider, states she receives home health house calls, but was told she needs to establish care with a community PCP.  Patient reports new diagnosis of a brain aneurysm noted this past year, states she was worked up at [facility] for this but has admittedly not undergone the follow-up recommendations for management of this, she states that she is aware and plans to begin getting her house in order.

## 2024-09-26 NOTE — REVIEW OF SYSTEMS
[FreeTextEntry2] : night sweats [FreeTextEntry3] : right forehead lesion with redness and associated tenderness  [FreeTextEntry9] : muscle pain  [de-identified] : excessive thirst

## 2024-09-26 NOTE — PHYSICAL EXAM
[TextEntry] : Deferred as this was a telehealth [de-identified] : heather-areolar incision well healed, 4:00 2cmFN tenderness, no dominant mass  [de-identified] : no nipple discharge appreciated on physical exam  [de-identified] : diffuse open comedones of b/l breast skin most notable in LIQ

## 2024-09-26 NOTE — DATA REVIEWED
[FreeTextEntry1] : 7/31/20 ([facility]) b/l screening mammogram/US: scattered areas of fibroglandular density. No suspicious masses, architectural distortion, or significant calcifications are detected. Nodularity in the anterior outer right breast is unchanged. Nodularity with associated calcification noted left retroareolar breast is unchanged. Right breast: There are no solid or suspicious lesions identified in the right breast. Left breast:Subareolar breast measuring 0.3 x 0.4 x 0.2 cm. Possible solid nodule. New. There is no pathological lymphadenopathy in either axilla. IMPRESSION: Possible new solid nodule in the immediate subareolar left breast identified sonographically. FOLLOW-UP: Additional imaging. Targeted sonography of the immediate subareolar left breast directly radiology supervision recommended to determine if findings represent true new mass. ASSESSMENT: BI-RADS Category 0  8/12/20 ([facility]) left breast US: In the periareolar left breast within the skin there is a 0.3 cm hypoechoic nodule noted. This has the appearance of a sebaceous cyst. No lesions are identified within the breast tissue. IMPRESSION: Findings suggestive of a sebaceous cyst within the left periareolar region. This is new. FOLLOW-UP: Follow-up imaging in 6 months. Six-month follow-up targeted left breast sonography recommended to ensure stability or evaluate for resolution. ASSESSMENT: BI-RADS Category 3: Probably benign.  5/20/21 ([facility]) left breast US: Subareolar breast measuring 0.3 x 0.3 x 0.2 cm. Sebaceous cyst. Unchanged. IMPRESSION: Stable cysts noted left subareolar breast. FOLLOW-UP: Follow-up imaging in 2 months. BI-RADS Category 3: Probably benign.  8/29/23 ([facility]) TSH: 4.820 (high, ref 0.270-4.2)  8/29/23 ([facility]) prolactin: 4.3 (ref 3.4-24.1).   11/28/23 ([facility]) b/l dx mammogram/US: breasts are almost entirely fatty, There is a new questioned mass with questioned distortion in the superficial right upper outer quadrant which likely corresponds with an isoechoic mass with cystic spaces on subsequent ultrasound. Ultrasound-guided core biopsy is recommended. At the posterior right breast 12 o'clock axis, there are two adjacent approximately 6-7 mm masses which were not present on the prior mammograms and have no sonographic correlate. Six-month follow-up right unilateral mammography is recommended. The left breast is unremarkable. 1. Ultrasound-guided core biopsy is recommended of a right breast 9 o'clock axis sonographic lesion which likely corresponds with a right upper outer quadrant mammographic mass. If post procedure mammogram demonstrates that the biopsied mass does not correspond with the mammographic finding, stereotactic core biopsy should be considered. 2. Six-month follow-up right unilateral mammography is recommended to assess for stability or change of probably benign mammographic masses at the posterior right breast 12 o'clock axis. FOLLOW-UP: Ultrasound guided biopsy. ASSESSMENT: BI-RADS Category 4: Suspicious.  8/26/24 ([facility]) b/l dx mammogram & US: Scattered areas of fibroglandular density, previously described mass in the upper outer quadrant of the right breast it is decreased in size in comparison to prior study and likely represents mild ductal ectasia.  Previously described mass in the central upper right breast not seen on current exam.  Previously described complicated cyst in the right breast 9:00 4 cm FN no longer seen, likely represented evolving fat necrosis.  Small focally dilated ducts seen right breast 9:00 4 cm FN.  No mammographic or sonographic evidence of malignancy.  Benign BI-RADS 2

## 2024-09-30 ENCOUNTER — APPOINTMENT (OUTPATIENT)
Dept: HOME HEALTH SERVICES | Facility: HOME HEALTH | Age: 64
End: 2024-09-30
Payer: MEDICARE

## 2024-09-30 VITALS
HEART RATE: 78 BPM | TEMPERATURE: 97.2 F | OXYGEN SATURATION: 97 % | HEIGHT: 64 IN | RESPIRATION RATE: 20 BRPM | SYSTOLIC BLOOD PRESSURE: 128 MMHG | DIASTOLIC BLOOD PRESSURE: 66 MMHG

## 2024-09-30 DIAGNOSIS — E11.42 TYPE 2 DIABETES MELLITUS WITH DIABETIC POLYNEUROPATHY: ICD-10-CM

## 2024-09-30 DIAGNOSIS — E66.01 MORBID (SEVERE) OBESITY DUE TO EXCESS CALORIES: ICD-10-CM

## 2024-09-30 DIAGNOSIS — Z87.2 PERSONAL HISTORY OF DISEASES OF THE SKIN AND SUBCUTANEOUS TISSUE: ICD-10-CM

## 2024-09-30 DIAGNOSIS — Z86.19 PERSONAL HISTORY OF OTHER INFECTIOUS AND PARASITIC DISEASES: ICD-10-CM

## 2024-09-30 DIAGNOSIS — I72.9 ANEURYSM OF UNSPECIFIED SITE: ICD-10-CM

## 2024-09-30 DIAGNOSIS — M25.512 PAIN IN LEFT SHOULDER: ICD-10-CM

## 2024-09-30 DIAGNOSIS — N64.52 NIPPLE DISCHARGE: ICD-10-CM

## 2024-09-30 DIAGNOSIS — J44.89 OTHER SPECIFIED CHRONIC OBSTRUCTIVE PULMONARY DISEASE: ICD-10-CM

## 2024-09-30 DIAGNOSIS — I25.119 ATHEROSCLEROTIC HEART DISEASE OF NATIVE CORONARY ARTERY WITH UNSPECIFIED ANGINA PECTORIS: ICD-10-CM

## 2024-09-30 DIAGNOSIS — I73.9 PERIPHERAL VASCULAR DISEASE, UNSPECIFIED: ICD-10-CM

## 2024-09-30 DIAGNOSIS — Z72.0 TOBACCO USE: ICD-10-CM

## 2024-09-30 DIAGNOSIS — E11.8 TYPE 2 DIABETES MELLITUS WITH UNSPECIFIED COMPLICATIONS: ICD-10-CM

## 2024-09-30 DIAGNOSIS — R92.8 OTHER ABNORMAL AND INCONCLUSIVE FINDINGS ON DIAGNOSTIC IMAGING OF BREAST: ICD-10-CM

## 2024-09-30 DIAGNOSIS — S91.309A UNSPECIFIED OPEN WOUND, UNSPECIFIED FOOT, INITIAL ENCOUNTER: ICD-10-CM

## 2024-09-30 DIAGNOSIS — F31.9 BIPOLAR DISORDER, UNSPECIFIED: ICD-10-CM

## 2024-09-30 PROCEDURE — 99349 HOME/RES VST EST MOD MDM 40: CPT

## 2024-09-30 RX ORDER — POVIDONE-IODINE 10 MG/ML
10 SOLUTION TOPICAL
Qty: 2 | Refills: 3 | Status: ACTIVE | COMMUNITY
Start: 2024-09-30 | End: 1900-01-01

## 2024-09-30 RX ORDER — ADHESIVE TAPE 3"X 2.3 YD
4"X4" TAPE, NON-MEDICATED TOPICAL
Qty: 3 | Refills: 0 | Status: ACTIVE | COMMUNITY
Start: 2024-09-30 | End: 1900-01-01

## 2024-09-30 NOTE — HISTORY OF PRESENT ILLNESS
[House Calls Co-Management Patient] : [unfilled] is a House Calls co-management patient [Patient is stable] : patient is stable [Education provided] : education provided [Patient] : patient [LastPCPVisitDate] : 07/24 [FreeTextEntry4] : pain management, cardiology, neurology, psychiatric, breast surgeon, podiatrists    [FreeTextEntry1] : Not homebound [FreeTextEntry2] : 09/30/2024 COVID SCREEN: Patient or caretaker denies fever, cough, trouble breathing, rash, vomiting. Patient has not been in close contact with anyone who is COVID-19 positive, or suspected of having COVID-19.  N95 mask, gloves, eye wear and gown (if indicated) used during visit: Yes.  Total face to face time with patient is 45 min.  HAROON MONET is an 64 year with Type 2 Diabetes with complication and on insulin, Hypertension, Hyperlipidemia, CAD s/p 2 stents, Mass on Right Kidney, 7 TIAs, 2 MIs, Major Depressive disorder, Smoker, JOSETTE, COPD with asthma (current tobacco use),history of abuse, GERD, Bipolar, Arthritis, right great toe amputation with open wound, right 3rd toe amputation, brain aneurysm, Scattered areas of fibro glandular density right breast, mass in the upper outer quadrant of the right breast seen today for follow-up home visit.  Geriatric Assessment: -Appetite/weight: appetite unhealthy, not following ADA diet -Gait/falls: steady, walk independently, No falls. -Pain:  b/l shoulder pain -Sleep: poor sleep at night, sleep better in morning  -BMs: regular, no constipation/diarrhea, continent -Urine: no pain, no frequent/urgency, no cloudy/blood in urine, continent -Skin: diabetic foot ulcer, toes amputation  -DME: none -Mood/memory: No depressed, alert, oriented -Communication: conversational -Health Maintenance: no screening given age. Not up to date  vaccine and refused vaccinated today  Hospitalization: #10/22 admission to [facility] for chest pain found to have CAD and 2 stents were placed. #05/23 admission to [facility] for  right great toe infection and  amputation #admitted to [facility] on 09/27/23 for 3rd right toe infection and necrotic. Right third toe amputation on 09/29/23. Patient was discharge home on 10/01/23 as per hospital discharge documents. #12/01/23 admission to [facility] for stroke like symptoms. Neurological w/u, found to have 8 mm brain aneurysm. Patient reports that during spinal tap procedure she was dropped from the table, did not sustain injury. Patient was discharge home 12/02/23 as per hospital discharge documents. # Non HIE Alert last hospitalization at [facility] on 05/14/24 for stroke like symptoms. She was discharged home on 05/16/24.   Active problems: #8/26/24 ([facility]) b/l dx mammogram & US: Scattered areas of fibro glandular density, previously described mass in the upper outer quadrant of the right breast it is decreased in size in comparison to prior study and likely represents mild ductal ectasia. Previously described mass in the central upper right breast not seen on current exam. Previously described complicated cyst in the right breast 9:00 4 cm FN no longer seen, likely represented evolving fat necrosis. Small focally dilated ducts seen right breast 9:00 4 cm FN. No mammographic or sonographic evidence of malignancy. Benign BI-RADS 2 History of left breast biopsies x 4 with reportedly benign findings. Patient has a history of left breast excisional biopsies Dr. Micah العراقي ([facility]) with reportedly benign pathology. AVIS lifetime risk of 7.2%; Denies family history of breast or ovarian cancer #Aneurysm  -brain aneurysm  past year, states she was worked up at [facility] for this but has admittedly not undergone the follow-up recommendations for management of this. #Right great toe amputation wound - small wound approximately [id number]. - apply betadine, wrap w/ gauze -Podiatrist appointment 09/30/24 #Tobacco user  -not ready for smoke cessation  #Bipolar depression  #COPD with asthma with tobacco use #Coronary artery disease involving native coronary artery of native heart with angina pectoris  #Diabetic peripheral neuropathy  #Generalized anxiety disorder  #Left shoulder pain  #Morbid obesity  #Peripheral vascular disease  Spinal stenosis of lumbar region without neurogenic claudication (724.02) (M48.061) #Type 2 diabetes mellitus with complications  Patient/ patient's caregiver reports no weight loss >10 lbs in the past 6 months. No changes in dentition or swallowing reported, No changes in hearing or vision reported. No changes in Cognition reported. Patient reports depression but denies any suicidal or homicidal ideation. Patient is ADL independent and IADL independent. No changes in gait. Reports of falls with no injury months ago.  Patient's home environment is safe.   Goals of care discussed 10mins. DNR/DNI, limitation.

## 2024-09-30 NOTE — REVIEW OF SYSTEMS
[Unsteady Walking] : ataxia [Negative] : Heme/Lymph [Itching] : no itching [Mole Changes] : no mole changes [Nail Changes] : no nail changes [Hair Changes] : no hair changes [Skin Rash] : no skin rash [Headache] : no headache [Dizziness] : no dizziness [Fainting] : no fainting [Confusion] : no confusion [Memory Loss] : no memory loss [de-identified] : right great toe and third toe amputation, toe ulcer

## 2024-09-30 NOTE — HEALTH RISK ASSESSMENT
[Independent] : managing finances [Any fall with injury in past year] : Patient reported fall with injury in the past year [No] : The patient does not have visual impairment [HRA Reviewed] : Health risk assessment reviewed [Some assistance needed] : doing laundry [FreeTextEntry8] : PRN cane [TimeGetUpGo] : 0 [de-identified] : able to ambulate but did not participate

## 2024-09-30 NOTE — REASON FOR VISIT
[Follow-Up] : a follow-up visit [Formal Caregiver] : formal caregiver [Pre-Visit Preparation] : pre-visit preparation was done [Intercurrent Specialty/Sub-specialty Visits] : the patient has intercurrent specialty/sub-specialty visits [FreeTextEntry2] : chart reviewed [FreeTextEntry3] : Cardiology, Pain Management, Psychiatry, Neurology, breast surgeon, podiatrist

## 2024-09-30 NOTE — HEALTH RISK ASSESSMENT
[Independent] : managing finances [Any fall with injury in past year] : Patient reported fall with injury in the past year [No] : The patient does not have visual impairment [HRA Reviewed] : Health risk assessment reviewed [Some assistance needed] : doing laundry [FreeTextEntry8] : PRN cane [TimeGetUpGo] : 0 [de-identified] : able to ambulate but did not participate

## 2024-09-30 NOTE — REVIEW OF SYSTEMS
[Unsteady Walking] : ataxia [Negative] : Heme/Lymph [Itching] : no itching [Mole Changes] : no mole changes [Nail Changes] : no nail changes [Hair Changes] : no hair changes [Skin Rash] : no skin rash [Headache] : no headache [Dizziness] : no dizziness [Fainting] : no fainting [Confusion] : no confusion [Memory Loss] : no memory loss [de-identified] : right great toe and third toe amputation, toe ulcer

## 2024-09-30 NOTE — PHYSICAL EXAM
[Normal Reflexes] : deep tendon reflexes were 2+ and symmetric [No Acute Distress] : no acute distress [Normal Voice/Communication] : normal voice communication [Normal Sclera/Conjunctiva] : normal sclera/conjunctiva [EOMI] : extra ocular movement intact [Normal Outer Ear/Nose] : the ears and nose were normal in appearance [Normal TMs] : both tympanic membranes were normal [No JVD] : no jugular venous distention [No LAD] : no lymphadenopathy [No Respiratory Distress] : no respiratory distress [Clear to Auscultation] : lungs were clear to auscultation bilaterally [No Accessory Muscle Use] : no accessory muscle use [Normal Rate] : heart rate was normal  [Regular Rhythm] : with a regular rhythm [Normal S1, S2] : normal S1 and S2 [No Murmurs] : no murmurs heard [No Edema] : there was no peripheral edema [Normal Bowel Sounds] : normal bowel sounds [Non Tender] : non-tender [Soft] : abdomen soft [Not Distended] : not distended [Normal Post Cervical Nodes] : no posterior cervical lymphadenopathy [Normal Anterior Cervical Nodes] : no anterior cervical lymphadenopathy [No CVA Tenderness] : no ~M costovertebral angle tenderness [No Joint Swelling] : no joint swelling seen [Cranial Nerves Intact] : cranial nerves 2-12 were intact [Oriented x3] : oriented to person, place, and time [Foot Ulcers] : foot ulcers [Kyphosis] : no kyphosis present [Scoliosis] : scoliosis not present [Over the Past 2 Weeks, Have You Felt Down, Depressed, or Hopeless?] : 1.) Over the past 2 weeks, have you felt down, depressed, or hopeless? No [Over the Past 2 Weeks, Have You Felt Little Interest or Pleasure Doing Things?] : 2.) Over the past 2 weeks, have you felt little interest or pleasure doing things? No [de-identified] : unsteady gait [de-identified] : right great toe and third toes amputee, right great toe ulcer  [de-identified] : right great toe amputee diabetic ulcer

## 2024-09-30 NOTE — PHYSICAL EXAM
[Normal Reflexes] : deep tendon reflexes were 2+ and symmetric [No Acute Distress] : no acute distress [Normal Voice/Communication] : normal voice communication [Normal Sclera/Conjunctiva] : normal sclera/conjunctiva [EOMI] : extra ocular movement intact [Normal Outer Ear/Nose] : the ears and nose were normal in appearance [Normal TMs] : both tympanic membranes were normal [No JVD] : no jugular venous distention [No LAD] : no lymphadenopathy [No Respiratory Distress] : no respiratory distress [Clear to Auscultation] : lungs were clear to auscultation bilaterally [No Accessory Muscle Use] : no accessory muscle use [Normal Rate] : heart rate was normal  [Regular Rhythm] : with a regular rhythm [Normal S1, S2] : normal S1 and S2 [No Murmurs] : no murmurs heard [No Edema] : there was no peripheral edema [Normal Bowel Sounds] : normal bowel sounds [Non Tender] : non-tender [Soft] : abdomen soft [Not Distended] : not distended [Normal Post Cervical Nodes] : no posterior cervical lymphadenopathy [Normal Anterior Cervical Nodes] : no anterior cervical lymphadenopathy [No CVA Tenderness] : no ~M costovertebral angle tenderness [No Joint Swelling] : no joint swelling seen [Cranial Nerves Intact] : cranial nerves 2-12 were intact [Oriented x3] : oriented to person, place, and time [Foot Ulcers] : foot ulcers [Kyphosis] : no kyphosis present [Scoliosis] : scoliosis not present [Over the Past 2 Weeks, Have You Felt Down, Depressed, or Hopeless?] : 1.) Over the past 2 weeks, have you felt down, depressed, or hopeless? No [Over the Past 2 Weeks, Have You Felt Little Interest or Pleasure Doing Things?] : 2.) Over the past 2 weeks, have you felt little interest or pleasure doing things? No [de-identified] : unsteady gait [de-identified] : right great toe and third toes amputee, right great toe ulcer  [de-identified] : right great toe amputee diabetic ulcer

## 2024-09-30 NOTE — CHRONIC CARE ASSESSMENT
[Less than 3 Times Per Week] : exercises less than 3 times per week [< 30 Minutes/Session] : (< 30 minutes per session) [General Adherence] : and is generally adherent [Walking] : walking [Aerobic Conditioning] : aerobic conditioning [Diabetic Diet] : diabetic [Low Fat Diet] : low fat [Low Carb Diet] : low carbohydrate [Low Salt Diet] : low salt [PPS Score: ____] : Palliative Performance Scale (PPS) Score: [unfilled] [de-identified] : brain aneurysm, DM2 with complication, extensive cardiac history  (HTN, CAD s/p 2 stent), TIA, tobacco user, toe amputation with diabetic ulcer,  [de-identified] : walks as tolerated [de-identified] : ADA, low fat, low sodium- but patient is non adherent

## 2024-09-30 NOTE — CURRENT MEDS
Date & Time: 5/23/2024, 7:57 PM  Patient: Margarita Monroe  Encounter Provider(s):    Jasvir West MD       To Whom It May Concern:    Margarita Monroe was seen and treated in our department on 5/23/2024. She  should not return to day care until 5/25/2024 .    If you have any questions or concerns, please do not hesitate to call.        _____________________________  Physician/APC Signature            [Non adherent to medications] : Patient is non adherent to medications as prescribed [Medication and Allergies Reconciled] : medication and allergies reconciled [High Risk Medications Reviewed and Reconciled (Beers Criteria)] : high risk medications reviewed and reconciled [Reviewed patient reported medication adherence from Comprehensive Assessment] : Reviewed patient reported medication adherence from comprehensive assessment

## 2024-09-30 NOTE — CHRONIC CARE ASSESSMENT
[Less than 3 Times Per Week] : exercises less than 3 times per week [< 30 Minutes/Session] : (< 30 minutes per session) [General Adherence] : and is generally adherent [Walking] : walking [Aerobic Conditioning] : aerobic conditioning [Diabetic Diet] : diabetic [Low Fat Diet] : low fat [Low Carb Diet] : low carbohydrate [Low Salt Diet] : low salt [PPS Score: ____] : Palliative Performance Scale (PPS) Score: [unfilled] [de-identified] : brain aneurysm, DM2 with complication, extensive cardiac history  (HTN, CAD s/p 2 stent), TIA, tobacco user, toe amputation with diabetic ulcer,  [de-identified] : walks as tolerated [de-identified] : ADA, low fat, low sodium- but patient is non adherent

## 2024-09-30 NOTE — COUNSELING
[] : foot exam [Completed] : Aspirin use discussion completed [Full Code] : Code Status: Full Code [No Limitations] : Treatment Guidelines: No limitations [Long Term Intubation] : Intubation: Long term intubation [Obese -  ( BMI  >29.9 )] : obese - ( BMI  >29.9 ) [DASH diet recommended] : DASH diet recommended [Sodium restriction 2gm recommended] : sodium restriction 2 gm recommended [Smoker, not interested in quitting] : smoker, not interested in quitting [Smoke/CO Detectors] : smoke/CO detectors [Use grab bars] : use grab bars [Use assistive device to avoid falls] : use assistive device to avoid falls [Remove clutter and unsafe carpeting to avoid falls] : remove clutter and unsafe carpeting to avoid falls [Patient not on disease-modifying anti-rheumatic drug due to overall prognosis] : Patient not on disease-modifying anti-rheumatic drug due to overall prognosis [Improve weight] : improve weight [Improve pain control] : improve pain control [Minimize unnecessary interventions] : minimize unnecessary interventions [Discussed disease trajectory with patient/caregiver] : discussed disease trajectory with patient/caregiver

## 2024-09-30 NOTE — HISTORY OF PRESENT ILLNESS
[House Calls Co-Management Patient] : [unfilled] is a House Calls co-management patient [Patient is stable] : patient is stable [Education provided] : education provided [Patient] : patient [LastPCPVisitDate] : 07/24 [FreeTextEntry4] : pain management, cardiology, neurology, psychiatric, breast surgeon, podiatrists    [FreeTextEntry1] : Not homebound [FreeTextEntry2] : 09/30/2024 COVID SCREEN: Patient or caretaker denies fever, cough, trouble breathing, rash, vomiting. Patient has not been in close contact with anyone who is COVID-19 positive, or suspected of having COVID-19.  N95 mask, gloves, eye wear and gown (if indicated) used during visit: Yes.  Total face to face time with patient is 45 min.  HAROON MONET is an 64 year with Type 2 Diabetes with complication and on insulin, Hypertension, Hyperlipidemia, CAD s/p 2 stents, Mass on Right Kidney, 7 TIAs, 2 MIs, Major Depressive disorder, Smoker, JOSETTE, COPD with asthma (current tobacco use),history of abuse, GERD, Bipolar, Arthritis, right great toe amputation with open wound, right 3rd toe amputation, brain aneurysm, Scattered areas of fibro glandular density right breast, mass in the upper outer quadrant of the right breast seen today for follow-up home visit.  Geriatric Assessment: -Appetite/weight: appetite unhealthy, not following ADA diet -Gait/falls: steady, walk independently, No falls. -Pain:  b/l shoulder pain -Sleep: poor sleep at night, sleep better in morning  -BMs: regular, no constipation/diarrhea, continent -Urine: no pain, no frequent/urgency, no cloudy/blood in urine, continent -Skin: diabetic foot ulcer, toes amputation  -DME: none -Mood/memory: No depressed, alert, oriented -Communication: conversational -Health Maintenance: no screening given age. Not up to date  vaccine and refused vaccinated today  Hospitalization: #10/22 admission to [facility] for chest pain found to have CAD and 2 stents were placed. #05/23 admission to [facility] for  right great toe infection and  amputation #admitted to [facility] on 09/27/23 for 3rd right toe infection and necrotic. Right third toe amputation on 09/29/23. Patient was discharge home on 10/01/23 as per hospital discharge documents. #12/01/23 admission to [facility] for stroke like symptoms. Neurological w/u, found to have 8 mm brain aneurysm. Patient reports that during spinal tap procedure she was dropped from the table, did not sustain injury. Patient was discharge home 12/02/23 as per hospital discharge documents. # Non HIE Alert last hospitalization at [facility] on 05/14/24 for stroke like symptoms. She was discharged home on 05/16/24.   Active problems: #8/26/24 ([facility]) b/l dx mammogram & US: Scattered areas of fibro glandular density, previously described mass in the upper outer quadrant of the right breast it is decreased in size in comparison to prior study and likely represents mild ductal ectasia. Previously described mass in the central upper right breast not seen on current exam. Previously described complicated cyst in the right breast 9:00 4 cm FN no longer seen, likely represented evolving fat necrosis. Small focally dilated ducts seen right breast 9:00 4 cm FN. No mammographic or sonographic evidence of malignancy. Benign BI-RADS 2 History of left breast biopsies x 4 with reportedly benign findings. Patient has a history of left breast excisional biopsies Dr. Micah العراقي ([facility]) with reportedly benign pathology. AVIS lifetime risk of 7.2%; Denies family history of breast or ovarian cancer #Aneurysm  -brain aneurysm  past year, states she was worked up at [facility] for this but has admittedly not undergone the follow-up recommendations for management of this. #Right great toe amputation wound - small wound approximately [id number]. - apply betadine, wrap w/ gauze -Podiatrist appointment 09/30/24 #Tobacco user  -not ready for smoke cessation  #Bipolar depression  #COPD with asthma with tobacco use #Coronary artery disease involving native coronary artery of native heart with angina pectoris  #Diabetic peripheral neuropathy  #Generalized anxiety disorder  #Left shoulder pain  #Morbid obesity  #Peripheral vascular disease  Spinal stenosis of lumbar region without neurogenic claudication (724.02) (M48.061) #Type 2 diabetes mellitus with complications  Patient/ patient's caregiver reports no weight loss >10 lbs in the past 6 months. No changes in dentition or swallowing reported, No changes in hearing or vision reported. No changes in Cognition reported. Patient reports depression but denies any suicidal or homicidal ideation. Patient is ADL independent and IADL independent. No changes in gait. Reports of falls with no injury months ago.  Patient's home environment is safe.   Goals of care discussed 10mins. DNR/DNI, limitation.

## 2024-10-09 ENCOUNTER — APPOINTMENT (OUTPATIENT)
Dept: ORTHOPEDIC SURGERY | Facility: CLINIC | Age: 64
End: 2024-10-09
Payer: MEDICARE

## 2024-10-09 VITALS — BODY MASS INDEX: 36.19 KG/M2 | HEIGHT: 64 IN | WEIGHT: 212 LBS

## 2024-10-09 DIAGNOSIS — M50.90 CERVICAL DISC DISORDER, UNSPECIFIED, UNSPECIFIED CERVICAL REGION: ICD-10-CM

## 2024-10-09 DIAGNOSIS — M19.011 PRIMARY OSTEOARTHRITIS, RIGHT SHOULDER: ICD-10-CM

## 2024-10-09 DIAGNOSIS — M19.012 PRIMARY OSTEOARTHRITIS, RIGHT SHOULDER: ICD-10-CM

## 2024-10-09 PROCEDURE — 73030 X-RAY EXAM OF SHOULDER: CPT | Mod: LT

## 2024-10-09 PROCEDURE — 72040 X-RAY EXAM NECK SPINE 2-3 VW: CPT

## 2024-10-09 PROCEDURE — 99203 OFFICE O/P NEW LOW 30 MIN: CPT | Mod: 25

## 2024-10-09 PROCEDURE — 20610 DRAIN/INJ JOINT/BURSA W/O US: CPT | Mod: 50

## 2024-10-23 ENCOUNTER — APPOINTMENT (OUTPATIENT)
Dept: DERMATOLOGY | Facility: CLINIC | Age: 64
End: 2024-10-23

## 2024-11-20 ENCOUNTER — TRANSCRIPTION ENCOUNTER (OUTPATIENT)
Age: 64
End: 2024-11-20

## 2024-11-20 ENCOUNTER — NON-APPOINTMENT (OUTPATIENT)
Age: 64
End: 2024-11-20

## 2024-11-20 DIAGNOSIS — T14.8XXA OTHER INJURY OF UNSPECIFIED BODY REGION, INITIAL ENCOUNTER: ICD-10-CM

## 2024-11-20 DIAGNOSIS — L08.9 OTHER INJURY OF UNSPECIFIED BODY REGION, INITIAL ENCOUNTER: ICD-10-CM

## 2024-11-20 RX ORDER — GAUZE BANDAGE 4" X 4"
4"X4" BANDAGE TOPICAL
Qty: 1 | Refills: 1 | Status: ACTIVE | COMMUNITY
Start: 2024-11-20 | End: 1900-01-01

## 2024-11-20 RX ORDER — MUPIROCIN 20 MG/G
2 OINTMENT TOPICAL
Qty: 1 | Refills: 2 | Status: ACTIVE | COMMUNITY
Start: 2024-11-20 | End: 1900-01-01

## 2024-11-20 RX ORDER — ADHESIVE TAPE 3"X 2.3 YD
TAPE, NON-MEDICATED TOPICAL
Qty: 5 | Refills: 3 | Status: ACTIVE | COMMUNITY
Start: 2024-11-20 | End: 1900-01-01

## 2024-11-20 RX ORDER — CEPHALEXIN 250 MG/1
250 TABLET ORAL
Qty: 20 | Refills: 0 | Status: ACTIVE | COMMUNITY
Start: 2024-11-20 | End: 1900-01-01

## 2024-11-20 RX ORDER — MAG HYDROX/ALUMINUM HYD/SIMETH 400-400-40
0.9 SUSPENSION, ORAL (FINAL DOSE FORM) ORAL
Qty: 1 | Refills: 0 | Status: ACTIVE | COMMUNITY
Start: 2024-11-20 | End: 1900-01-01

## 2024-12-03 ENCOUNTER — APPOINTMENT (OUTPATIENT)
Dept: HOME HEALTH SERVICES | Facility: HOME HEALTH | Age: 64
End: 2024-12-03

## 2024-12-10 ENCOUNTER — APPOINTMENT (OUTPATIENT)
Dept: HOME HEALTH SERVICES | Facility: HOME HEALTH | Age: 64
End: 2024-12-10
Payer: MEDICARE

## 2024-12-10 VITALS — SYSTOLIC BLOOD PRESSURE: 134 MMHG | OXYGEN SATURATION: 96 % | DIASTOLIC BLOOD PRESSURE: 68 MMHG | HEART RATE: 80 BPM

## 2024-12-10 DIAGNOSIS — L97.518 TYPE 2 DIABETES MELLITUS WITH FOOT ULCER: ICD-10-CM

## 2024-12-10 DIAGNOSIS — Z23 ENCOUNTER FOR IMMUNIZATION: ICD-10-CM

## 2024-12-10 DIAGNOSIS — E66.01 MORBID (SEVERE) OBESITY DUE TO EXCESS CALORIES: ICD-10-CM

## 2024-12-10 DIAGNOSIS — M19.90 UNSPECIFIED OSTEOARTHRITIS, UNSPECIFIED SITE: ICD-10-CM

## 2024-12-10 DIAGNOSIS — E11.42 TYPE 2 DIABETES MELLITUS WITH DIABETIC POLYNEUROPATHY: ICD-10-CM

## 2024-12-10 DIAGNOSIS — I73.9 PERIPHERAL VASCULAR DISEASE, UNSPECIFIED: ICD-10-CM

## 2024-12-10 DIAGNOSIS — I72.9 ANEURYSM OF UNSPECIFIED SITE: ICD-10-CM

## 2024-12-10 DIAGNOSIS — I25.119 ATHEROSCLEROTIC HEART DISEASE OF NATIVE CORONARY ARTERY WITH UNSPECIFIED ANGINA PECTORIS: ICD-10-CM

## 2024-12-10 DIAGNOSIS — T14.8XXA OTHER INJURY OF UNSPECIFIED BODY REGION, INITIAL ENCOUNTER: ICD-10-CM

## 2024-12-10 DIAGNOSIS — E11.8 TYPE 2 DIABETES MELLITUS WITH UNSPECIFIED COMPLICATIONS: ICD-10-CM

## 2024-12-10 DIAGNOSIS — N60.11 DIFFUSE CYSTIC MASTOPATHY OF RIGHT BREAST: ICD-10-CM

## 2024-12-10 DIAGNOSIS — L08.9 OTHER INJURY OF UNSPECIFIED BODY REGION, INITIAL ENCOUNTER: ICD-10-CM

## 2024-12-10 DIAGNOSIS — E11.621 TYPE 2 DIABETES MELLITUS WITH FOOT ULCER: ICD-10-CM

## 2024-12-10 DIAGNOSIS — J44.89 OTHER SPECIFIED CHRONIC OBSTRUCTIVE PULMONARY DISEASE: ICD-10-CM

## 2024-12-10 DIAGNOSIS — F31.9 BIPOLAR DISORDER, UNSPECIFIED: ICD-10-CM

## 2024-12-10 PROCEDURE — 99350 HOME/RES VST EST HIGH MDM 60: CPT

## 2024-12-11 VITALS
DIASTOLIC BLOOD PRESSURE: 68 MMHG | RESPIRATION RATE: 20 BRPM | TEMPERATURE: 97.2 F | HEART RATE: 80 BPM | SYSTOLIC BLOOD PRESSURE: 134 MMHG | HEIGHT: 64 IN | OXYGEN SATURATION: 96 %

## 2024-12-11 PROBLEM — Z23 NEED FOR COVID-19 VACCINE: Status: ACTIVE | Noted: 2024-12-11

## 2024-12-11 PROBLEM — N60.11 FIBROCYSTIC DISEASE OF RIGHT BREAST: Status: ACTIVE | Noted: 2024-12-11

## 2024-12-11 PROBLEM — M19.90 ARTHRITIS: Noted: 2023-07-20

## 2024-12-11 PROBLEM — E11.621 DIABETIC ULCER OF TOE OF RIGHT FOOT ASSOCIATED WITH TYPE 2 DIABETES MELLITUS, WITH OTHER ULCER SEVERITY: Status: ACTIVE | Noted: 2024-12-11

## 2024-12-11 RX ORDER — IBUPROFEN 200 MG
CAPSULE ORAL
Qty: 3 | Refills: 0 | Status: ACTIVE | COMMUNITY
Start: 2024-12-11 | End: 1900-01-01

## 2024-12-12 ENCOUNTER — MED ADMIN CHARGE (OUTPATIENT)
Age: 64
End: 2024-12-12

## 2024-12-12 ENCOUNTER — APPOINTMENT (OUTPATIENT)
Dept: HOME HEALTH SERVICES | Facility: HOME HEALTH | Age: 64
End: 2024-12-12

## 2024-12-12 VITALS
TEMPERATURE: 97.1 F | RESPIRATION RATE: 19 BRPM | HEART RATE: 76 BPM | OXYGEN SATURATION: 95 % | DIASTOLIC BLOOD PRESSURE: 70 MMHG | SYSTOLIC BLOOD PRESSURE: 138 MMHG

## 2024-12-19 ENCOUNTER — APPOINTMENT (OUTPATIENT)
Dept: DERMATOLOGY | Facility: CLINIC | Age: 64
End: 2024-12-19

## 2024-12-23 ENCOUNTER — APPOINTMENT (OUTPATIENT)
Dept: HOME HEALTH SERVICES | Facility: HOME HEALTH | Age: 64
End: 2024-12-23
Payer: MEDICARE

## 2024-12-23 VITALS
HEIGHT: 64 IN | SYSTOLIC BLOOD PRESSURE: 126 MMHG | HEART RATE: 85 BPM | DIASTOLIC BLOOD PRESSURE: 80 MMHG | RESPIRATION RATE: 20 BRPM | TEMPERATURE: 97.2 F | OXYGEN SATURATION: 98 %

## 2024-12-23 DIAGNOSIS — E11.42 TYPE 2 DIABETES MELLITUS WITH DIABETIC POLYNEUROPATHY: ICD-10-CM

## 2024-12-23 DIAGNOSIS — E11.8 TYPE 2 DIABETES MELLITUS WITH UNSPECIFIED COMPLICATIONS: ICD-10-CM

## 2024-12-23 DIAGNOSIS — L97.518 TYPE 2 DIABETES MELLITUS WITH FOOT ULCER: ICD-10-CM

## 2024-12-23 DIAGNOSIS — F31.9 BIPOLAR DISORDER, UNSPECIFIED: ICD-10-CM

## 2024-12-23 DIAGNOSIS — E66.01 MORBID (SEVERE) OBESITY DUE TO EXCESS CALORIES: ICD-10-CM

## 2024-12-23 DIAGNOSIS — J44.89 OTHER SPECIFIED CHRONIC OBSTRUCTIVE PULMONARY DISEASE: ICD-10-CM

## 2024-12-23 DIAGNOSIS — I25.119 ATHEROSCLEROTIC HEART DISEASE OF NATIVE CORONARY ARTERY WITH UNSPECIFIED ANGINA PECTORIS: ICD-10-CM

## 2024-12-23 DIAGNOSIS — I73.9 PERIPHERAL VASCULAR DISEASE, UNSPECIFIED: ICD-10-CM

## 2024-12-23 DIAGNOSIS — E11.621 TYPE 2 DIABETES MELLITUS WITH FOOT ULCER: ICD-10-CM

## 2024-12-23 DIAGNOSIS — I72.9 ANEURYSM OF UNSPECIFIED SITE: ICD-10-CM

## 2024-12-23 PROCEDURE — 99349 HOME/RES VST EST MOD MDM 40: CPT

## 2025-01-10 ENCOUNTER — RX RENEWAL (OUTPATIENT)
Age: 65
End: 2025-01-10

## 2025-01-16 ENCOUNTER — NON-APPOINTMENT (OUTPATIENT)
Age: 65
End: 2025-01-16

## 2025-02-11 ENCOUNTER — RX RENEWAL (OUTPATIENT)
Age: 65
End: 2025-02-11

## 2025-03-11 ENCOUNTER — RX RENEWAL (OUTPATIENT)
Age: 65
End: 2025-03-11

## 2025-03-19 ENCOUNTER — APPOINTMENT (OUTPATIENT)
Dept: HOME HEALTH SERVICES | Facility: HOME HEALTH | Age: 65
End: 2025-03-19
Payer: MEDICARE

## 2025-03-19 DIAGNOSIS — L97.518 TYPE 2 DIABETES MELLITUS WITH FOOT ULCER: ICD-10-CM

## 2025-03-19 DIAGNOSIS — F31.9 BIPOLAR DISORDER, UNSPECIFIED: ICD-10-CM

## 2025-03-19 DIAGNOSIS — E11.621 TYPE 2 DIABETES MELLITUS WITH FOOT ULCER: ICD-10-CM

## 2025-03-19 DIAGNOSIS — Z71.6 TOBACCO ABUSE COUNSELING: ICD-10-CM

## 2025-03-19 DIAGNOSIS — E11.52 TYPE 2 DIABETES MELLITUS WITH DIABETIC PERIPHERAL ANGIOPATHY WITH GANGRENE: ICD-10-CM

## 2025-03-19 DIAGNOSIS — I73.9 PERIPHERAL VASCULAR DISEASE, UNSPECIFIED: ICD-10-CM

## 2025-03-19 DIAGNOSIS — Z79.4 TYPE 2 DIABETES MELLITUS WITH DIABETIC PERIPHERAL ANGIOPATHY WITH GANGRENE: ICD-10-CM

## 2025-03-19 DIAGNOSIS — E11.8 TYPE 2 DIABETES MELLITUS WITH UNSPECIFIED COMPLICATIONS: ICD-10-CM

## 2025-03-19 DIAGNOSIS — Z71.89 OTHER SPECIFIED COUNSELING: ICD-10-CM

## 2025-03-19 DIAGNOSIS — I72.9 ANEURYSM OF UNSPECIFIED SITE: ICD-10-CM

## 2025-03-19 DIAGNOSIS — E66.01 MORBID (SEVERE) OBESITY DUE TO EXCESS CALORIES: ICD-10-CM

## 2025-03-19 DIAGNOSIS — I25.119 ATHEROSCLEROTIC HEART DISEASE OF NATIVE CORONARY ARTERY WITH UNSPECIFIED ANGINA PECTORIS: ICD-10-CM

## 2025-03-19 DIAGNOSIS — J44.89 OTHER SPECIFIED CHRONIC OBSTRUCTIVE PULMONARY DISEASE: ICD-10-CM

## 2025-03-19 DIAGNOSIS — E11.42 TYPE 2 DIABETES MELLITUS WITH DIABETIC POLYNEUROPATHY: ICD-10-CM

## 2025-03-19 PROCEDURE — 99349 HOME/RES VST EST MOD MDM 40: CPT | Mod: 25

## 2025-03-19 PROCEDURE — 99497 ADVNCD CARE PLAN 30 MIN: CPT

## 2025-03-20 VITALS
DIASTOLIC BLOOD PRESSURE: 68 MMHG | SYSTOLIC BLOOD PRESSURE: 122 MMHG | RESPIRATION RATE: 18 BRPM | OXYGEN SATURATION: 96 % | TEMPERATURE: 98 F | HEIGHT: 64 IN | HEART RATE: 76 BPM

## 2025-03-20 VITALS — SYSTOLIC BLOOD PRESSURE: 122 MMHG | DIASTOLIC BLOOD PRESSURE: 68 MMHG

## 2025-03-20 PROBLEM — E11.621 DIABETIC ULCER OF TOE OF RIGHT FOOT ASSOCIATED WITH TYPE 2 DIABETES MELLITUS, WITH OTHER ULCER SEVERITY: Status: RESOLVED | Noted: 2024-12-11 | Resolved: 2025-03-20

## 2025-03-20 PROBLEM — E11.52 TYPE 2 DIABETES MELLITUS WITH DIABETIC PERIPHERAL ANGIOPATHY AND GANGRENE, WITH LONG-TERM CURRENT USE OF INSULIN: Status: ACTIVE | Noted: 2025-03-20

## 2025-03-20 PROBLEM — Z71.6 ENCOUNTER FOR TOBACCO USE CESSATION COUNSELING: Status: ACTIVE | Noted: 2025-03-20

## 2025-04-07 ENCOUNTER — RX RENEWAL (OUTPATIENT)
Age: 65
End: 2025-04-07

## 2025-04-19 ENCOUNTER — NON-APPOINTMENT (OUTPATIENT)
Age: 65
End: 2025-04-19

## 2025-04-19 DIAGNOSIS — R09.81 NASAL CONGESTION: ICD-10-CM

## 2025-04-19 PROBLEM — R05.1 ACUTE COUGH: Status: ACTIVE | Noted: 2025-04-19

## 2025-04-19 PROBLEM — J40 BRONCHITIS: Status: ACTIVE | Noted: 2025-04-19

## 2025-04-19 RX ORDER — FLUTICASONE PROPIONATE 50 UG/1
50 SPRAY, METERED NASAL DAILY
Qty: 1 | Refills: 0 | Status: ACTIVE | COMMUNITY
Start: 2025-04-19 | End: 1900-01-01

## 2025-04-19 RX ORDER — ALBUTEROL SULFATE 0.63 MG/3ML
0.63 SOLUTION RESPIRATORY (INHALATION)
Qty: 2 | Refills: 3 | Status: ACTIVE | COMMUNITY
Start: 2025-04-19 | End: 1900-01-01

## 2025-04-19 RX ORDER — BLOOD-GLUCOSE METER
KIT MISCELLANEOUS
Qty: 1 | Refills: 0 | Status: ACTIVE | COMMUNITY
Start: 2025-04-19 | End: 1900-01-01

## 2025-04-19 RX ORDER — AZITHROMYCIN 250 MG/1
250 TABLET, FILM COATED ORAL
Qty: 1 | Refills: 0 | Status: ACTIVE | COMMUNITY
Start: 2025-04-19 | End: 1900-01-01

## 2025-04-19 RX ORDER — BENZONATATE 100 MG/1
100 CAPSULE ORAL 3 TIMES DAILY
Qty: 42 | Refills: 0 | Status: ACTIVE | COMMUNITY
Start: 2025-04-19 | End: 1900-01-01

## 2025-04-22 ENCOUNTER — LABORATORY RESULT (OUTPATIENT)
Age: 65
End: 2025-04-22

## 2025-04-23 ENCOUNTER — APPOINTMENT (OUTPATIENT)
Dept: HOME HEALTH SERVICES | Facility: HOME HEALTH | Age: 65
End: 2025-04-23
Payer: MEDICARE

## 2025-04-23 DIAGNOSIS — S91.309A UNSPECIFIED OPEN WOUND, UNSPECIFIED FOOT, INITIAL ENCOUNTER: ICD-10-CM

## 2025-04-23 DIAGNOSIS — Z86.19 PERSONAL HISTORY OF OTHER INFECTIOUS AND PARASITIC DISEASES: ICD-10-CM

## 2025-04-23 DIAGNOSIS — E11.42 TYPE 2 DIABETES MELLITUS WITH DIABETIC POLYNEUROPATHY: ICD-10-CM

## 2025-04-23 DIAGNOSIS — L08.9 PERSONAL HISTORY OF OTHER INFECTIOUS AND PARASITIC DISEASES: ICD-10-CM

## 2025-04-23 DIAGNOSIS — I25.119 ATHEROSCLEROTIC HEART DISEASE OF NATIVE CORONARY ARTERY WITH UNSPECIFIED ANGINA PECTORIS: ICD-10-CM

## 2025-04-23 DIAGNOSIS — J41.0 SIMPLE CHRONIC BRONCHITIS: ICD-10-CM

## 2025-04-23 DIAGNOSIS — F31.9 BIPOLAR DISORDER, UNSPECIFIED: ICD-10-CM

## 2025-04-23 DIAGNOSIS — J44.89 OTHER SPECIFIED CHRONIC OBSTRUCTIVE PULMONARY DISEASE: ICD-10-CM

## 2025-04-23 DIAGNOSIS — E11.8 TYPE 2 DIABETES MELLITUS WITH UNSPECIFIED COMPLICATIONS: ICD-10-CM

## 2025-04-23 DIAGNOSIS — I10 ESSENTIAL (PRIMARY) HYPERTENSION: ICD-10-CM

## 2025-04-23 DIAGNOSIS — Z87.09 PERSONAL HISTORY OF OTHER DISEASES OF THE RESPIRATORY SYSTEM: ICD-10-CM

## 2025-04-23 DIAGNOSIS — I72.9 ANEURYSM OF UNSPECIFIED SITE: ICD-10-CM

## 2025-04-23 DIAGNOSIS — E66.01 MORBID (SEVERE) OBESITY DUE TO EXCESS CALORIES: ICD-10-CM

## 2025-04-23 DIAGNOSIS — E11.52 TYPE 2 DIABETES MELLITUS WITH DIABETIC PERIPHERAL ANGIOPATHY WITH GANGRENE: ICD-10-CM

## 2025-04-23 DIAGNOSIS — Z79.4 TYPE 2 DIABETES MELLITUS WITH DIABETIC PERIPHERAL ANGIOPATHY WITH GANGRENE: ICD-10-CM

## 2025-04-23 DIAGNOSIS — R05.1 ACUTE COUGH: ICD-10-CM

## 2025-04-23 DIAGNOSIS — I73.9 PERIPHERAL VASCULAR DISEASE, UNSPECIFIED: ICD-10-CM

## 2025-04-23 PROCEDURE — 99496 TRANSJ CARE MGMT HIGH F2F 7D: CPT

## 2025-04-26 VITALS
SYSTOLIC BLOOD PRESSURE: 134 MMHG | OXYGEN SATURATION: 98 % | HEART RATE: 89 BPM | RESPIRATION RATE: 18 BRPM | DIASTOLIC BLOOD PRESSURE: 70 MMHG | TEMPERATURE: 97.3 F

## 2025-04-26 PROBLEM — S91.309A WOUND OF FOOT: Status: RESOLVED | Noted: 2024-09-25 | Resolved: 2025-04-26

## 2025-04-26 PROBLEM — R05.1 ACUTE COUGH: Status: RESOLVED | Noted: 2025-04-19 | Resolved: 2025-04-26

## 2025-04-26 PROBLEM — Z87.09 HISTORY OF BRONCHITIS: Status: RESOLVED | Noted: 2025-04-19 | Resolved: 2025-04-26

## 2025-04-26 PROBLEM — J41.0 SMOKERS' COUGH: Status: ACTIVE | Noted: 2025-04-26

## 2025-04-26 PROBLEM — Z86.19 HISTORY OF WOUND INFECTION: Status: RESOLVED | Noted: 2024-11-20 | Resolved: 2025-04-26

## 2025-04-26 RX ORDER — BLOOD-GLUCOSE METER
W/DEVICE KIT MISCELLANEOUS
Qty: 1 | Refills: 0 | Status: ACTIVE | COMMUNITY
Start: 2025-04-26 | End: 1900-01-01

## 2025-04-26 RX ORDER — LANCETS 33 GAUGE
EACH MISCELLANEOUS
Qty: 1 | Refills: 0 | Status: ACTIVE | COMMUNITY
Start: 2025-04-26 | End: 1900-01-01

## 2025-04-26 RX ORDER — BLOOD SUGAR DIAGNOSTIC
STRIP MISCELLANEOUS 3 TIMES DAILY
Qty: 90 | Refills: 3 | Status: ACTIVE | COMMUNITY
Start: 2025-04-26 | End: 1900-01-01

## 2025-05-20 ENCOUNTER — RX RENEWAL (OUTPATIENT)
Age: 65
End: 2025-05-20

## 2025-07-21 ENCOUNTER — APPOINTMENT (OUTPATIENT)
Dept: HOME HEALTH SERVICES | Facility: HOME HEALTH | Age: 65
End: 2025-07-21
Payer: MEDICARE

## 2025-07-21 VITALS
DIASTOLIC BLOOD PRESSURE: 78 MMHG | OXYGEN SATURATION: 97 % | SYSTOLIC BLOOD PRESSURE: 126 MMHG | TEMPERATURE: 96.9 F | HEART RATE: 93 BPM

## 2025-07-21 DIAGNOSIS — I72.9 ANEURYSM OF UNSPECIFIED SITE: ICD-10-CM

## 2025-07-21 DIAGNOSIS — Z12.31 ENCOUNTER FOR SCREENING MAMMOGRAM FOR MALIGNANT NEOPLASM OF BREAST: ICD-10-CM

## 2025-07-21 DIAGNOSIS — I25.119 ATHEROSCLEROTIC HEART DISEASE OF NATIVE CORONARY ARTERY WITH UNSPECIFIED ANGINA PECTORIS: ICD-10-CM

## 2025-07-21 DIAGNOSIS — E66.01 MORBID (SEVERE) OBESITY DUE TO EXCESS CALORIES: ICD-10-CM

## 2025-07-21 DIAGNOSIS — J44.89 OTHER SPECIFIED CHRONIC OBSTRUCTIVE PULMONARY DISEASE: ICD-10-CM

## 2025-07-21 DIAGNOSIS — Z79.4 TYPE 2 DIABETES MELLITUS WITH DIABETIC PERIPHERAL ANGIOPATHY WITH GANGRENE: ICD-10-CM

## 2025-07-21 DIAGNOSIS — Z12.11 ENCOUNTER FOR SCREENING FOR MALIGNANT NEOPLASM OF COLON: ICD-10-CM

## 2025-07-21 DIAGNOSIS — I73.9 PERIPHERAL VASCULAR DISEASE, UNSPECIFIED: ICD-10-CM

## 2025-07-21 DIAGNOSIS — E11.52 TYPE 2 DIABETES MELLITUS WITH DIABETIC PERIPHERAL ANGIOPATHY WITH GANGRENE: ICD-10-CM

## 2025-07-21 DIAGNOSIS — E11.42 TYPE 2 DIABETES MELLITUS WITH DIABETIC POLYNEUROPATHY: ICD-10-CM

## 2025-07-21 DIAGNOSIS — R19.7 DIARRHEA, UNSPECIFIED: ICD-10-CM

## 2025-07-21 DIAGNOSIS — Z87.898 PERSONAL HISTORY OF OTHER SPECIFIED CONDITIONS: ICD-10-CM

## 2025-07-21 DIAGNOSIS — J41.0 SIMPLE CHRONIC BRONCHITIS: ICD-10-CM

## 2025-07-21 DIAGNOSIS — F31.9 BIPOLAR DISORDER, UNSPECIFIED: ICD-10-CM

## 2025-07-21 DIAGNOSIS — E11.8 TYPE 2 DIABETES MELLITUS WITH UNSPECIFIED COMPLICATIONS: ICD-10-CM

## 2025-07-21 PROCEDURE — 99349 HOME/RES VST EST MOD MDM 40: CPT

## 2025-07-21 RX ORDER — ELECTROLYTES/DEXTROSE
32G X 4 MM SOLUTION, ORAL ORAL
Qty: 1 | Refills: 0 | Status: ACTIVE | COMMUNITY
Start: 2025-07-21 | End: 1900-01-01

## 2025-07-23 ENCOUNTER — NON-APPOINTMENT (OUTPATIENT)
Age: 65
End: 2025-07-23

## 2025-07-23 ENCOUNTER — RX RENEWAL (OUTPATIENT)
Age: 65
End: 2025-07-23

## 2025-07-24 ENCOUNTER — LABORATORY RESULT (OUTPATIENT)
Age: 65
End: 2025-07-24

## 2025-07-24 LAB — DEPRECATED O AND P PREP STL: NORMAL

## 2025-07-25 ENCOUNTER — LABORATORY RESULT (OUTPATIENT)
Age: 65
End: 2025-07-25

## (undated) DEVICE — GLV 7.5 PROTEXIS (WHITE)

## (undated) DEVICE — PACK ORTHO FOOT ANKLE

## (undated) DEVICE — VENODYNE/SCD SLEEVE CALF MEDIUM

## (undated) DEVICE — WARMING BLANKET UPPER ADULT